# Patient Record
Sex: MALE | Race: BLACK OR AFRICAN AMERICAN | NOT HISPANIC OR LATINO | Employment: OTHER | URBAN - METROPOLITAN AREA
[De-identification: names, ages, dates, MRNs, and addresses within clinical notes are randomized per-mention and may not be internally consistent; named-entity substitution may affect disease eponyms.]

---

## 2017-01-24 ENCOUNTER — TELEPHONE (OUTPATIENT)
Dept: FAMILY MEDICINE | Facility: CLINIC | Age: 77
End: 2017-01-24

## 2017-01-24 RX ORDER — CODEINE PHOSPHATE AND GUAIFENESIN 10; 100 MG/5ML; MG/5ML
10 SOLUTION ORAL EVERY 4 HOURS PRN
Qty: 236 ML | Refills: 1 | Status: ON HOLD | OUTPATIENT
Start: 2017-01-24 | End: 2017-08-19

## 2017-01-27 ENCOUNTER — HOSPITAL ENCOUNTER (EMERGENCY)
Facility: HOSPITAL | Age: 77
Discharge: HOME | End: 2017-01-27
Attending: FAMILY MEDICINE
Payer: MEDICARE

## 2017-01-27 VITALS
BODY MASS INDEX: 22.13 KG/M2 | OXYGEN SATURATION: 96 % | TEMPERATURE: 99 F | HEART RATE: 87 BPM | DIASTOLIC BLOOD PRESSURE: 78 MMHG | RESPIRATION RATE: 30 BRPM | WEIGHT: 146 LBS | SYSTOLIC BLOOD PRESSURE: 118 MMHG | HEIGHT: 68 IN

## 2017-01-27 DIAGNOSIS — R06.00 DYSPNEA: ICD-10-CM

## 2017-01-27 DIAGNOSIS — I50.43 ACUTE ON CHRONIC COMBINED SYSTOLIC AND DIASTOLIC CONGESTIVE HEART FAILURE: Primary | ICD-10-CM

## 2017-01-27 LAB
ANION GAP SERPL CALC-SCNC: 10 MMOL/L
BASOPHILS # BLD AUTO: 0.01 K/UL
BASOPHILS NFR BLD: 0.2 %
BUN SERPL-MCNC: 35 MG/DL
CALCIUM SERPL-MCNC: 9.4 MG/DL
CHLORIDE SERPL-SCNC: 109 MMOL/L
CO2 SERPL-SCNC: 24 MMOL/L
CREAT SERPL-MCNC: 1.45 MG/DL
DIFFERENTIAL METHOD: ABNORMAL
EOSINOPHIL # BLD AUTO: 0.1 K/UL
EOSINOPHIL NFR BLD: 1 %
ERYTHROCYTE [DISTWIDTH] IN BLOOD BY AUTOMATED COUNT: 13.5 %
EST. GFR  (AFRICAN AMERICAN): 54.1 ML/MIN/1.73 M^2
EST. GFR  (NON AFRICAN AMERICAN): 46.8 ML/MIN/1.73 M^2
GLUCOSE SERPL-MCNC: 117 MG/DL
HCT VFR BLD AUTO: 37.7 %
HGB BLD-MCNC: 12.3 G/DL
LYMPHOCYTES # BLD AUTO: 0.9 K/UL
LYMPHOCYTES NFR BLD: 16 %
MCH RBC QN AUTO: 32.1 PG
MCHC RBC AUTO-ENTMCNC: 32.6 %
MCV RBC AUTO: 98 FL
MONOCYTES # BLD AUTO: 1.2 K/UL
MONOCYTES NFR BLD: 19.8 %
NEUTROPHILS # BLD AUTO: 3.7 K/UL
NEUTROPHILS NFR BLD: 63 %
NT-PROBNP: 4230 PG/ML
PLATELET # BLD AUTO: 133 K/UL
PMV BLD AUTO: 11.1 FL
POTASSIUM SERPL-SCNC: 4.3 MMOL/L
RBC # BLD AUTO: 3.83 M/UL
SODIUM SERPL-SCNC: 143 MMOL/L
WBC # BLD AUTO: 5.82 K/UL

## 2017-01-27 PROCEDURE — 63600175 PHARM REV CODE 636 W HCPCS

## 2017-01-27 PROCEDURE — 83880 ASSAY OF NATRIURETIC PEPTIDE: CPT

## 2017-01-27 PROCEDURE — 85025 COMPLETE CBC W/AUTO DIFF WBC: CPT

## 2017-01-27 PROCEDURE — 80048 BASIC METABOLIC PNL TOTAL CA: CPT

## 2017-01-27 PROCEDURE — 93005 ELECTROCARDIOGRAM TRACING: CPT

## 2017-01-27 PROCEDURE — 96374 THER/PROPH/DIAG INJ IV PUSH: CPT

## 2017-01-27 PROCEDURE — 99284 EMERGENCY DEPT VISIT MOD MDM: CPT | Mod: 25

## 2017-01-27 RX ORDER — FUROSEMIDE 10 MG/ML
INJECTION INTRAMUSCULAR; INTRAVENOUS
Status: COMPLETED
Start: 2017-01-27 | End: 2017-01-27

## 2017-01-27 RX ORDER — FUROSEMIDE 10 MG/ML
40 INJECTION INTRAMUSCULAR; INTRAVENOUS
Status: COMPLETED | OUTPATIENT
Start: 2017-01-27 | End: 2017-01-27

## 2017-01-27 RX ADMIN — FUROSEMIDE 40 MG: 10 INJECTION INTRAMUSCULAR; INTRAVENOUS at 10:01

## 2017-01-27 RX ADMIN — FUROSEMIDE 40 MG: 10 INJECTION, SOLUTION INTRAMUSCULAR; INTRAVENOUS at 10:01

## 2017-01-27 NOTE — ED PROVIDER NOTES
"Encounter Date: 1/27/2017       History     Chief Complaint   Patient presents with    Shortness of Breath     SOB that started last night. Pt states he has had a cough and cold for the last few days.      Review of patient's allergies indicates:   Allergen Reactions    Sulfa (sulfonamide antibiotics) Hives     HPI Comments: Patient's a 76-year-old black male with a history of coronary artery disease, hypertension, congestive heart failure and an implanted dual-chamber pacemaker who comes in complaining of shortness of breath beginning last night.  States he has had a cold with cough for "the last few days" .  States he saw his primary care physician's nurse practitioner for the cough and cold recently (chart review shows visit was December 20, 2016) and was prescribed pills for cough (Tessalon Perles) but the pills "weren't working for me" and his PCP prescribed Cheratussin AC (3 days ago)    The history is provided by the patient and the spouse.     Past Medical History   Diagnosis Date    Chronic combined systolic and diastolic congestive heart failure     Coronary artery disease     Diabetes mellitus, type II     Hyperlipidemia     Hypertension     Primary cardiomyopathy     Valvular regurgitation      m/s MR    Ventricular tachycardia      No past medical history pertinent negatives.  Past Surgical History   Procedure Laterality Date    Coronary angioplasty with stent placement  4/15/2008; 10/16/2014     LAD; RCA    Cardiac defibrillator placement  10/2/2008; 1/5/2015     [employer]; converted to biventricular ICD VIVA QUAD XT CRT-D [id number]: Serial No. [id number]    Cardiac defibrillator placement       Family History   Problem Relation Age of Onset    Diabetes Maternal Aunt      Social History   Substance Use Topics    Smoking status: Never Smoker    Smokeless tobacco: Never Used    Alcohol use No     Review of Systems   Constitutional: Negative for fever.   Respiratory: Positive for cough and " shortness of breath.    Cardiovascular: Negative for chest pain and palpitations.   Gastrointestinal: Negative for abdominal pain and nausea.   Skin: Negative for color change.   Neurological: Negative for syncope.   All other systems reviewed and are negative.      Physical Exam   Initial Vitals   BP Pulse Resp Temp SpO2   01/27/17 0813 01/27/17 0813 01/27/17 0813 01/27/17 0813 01/27/17 0813   107/69 97 32 97.9 °F (36.6 °C) 96 %     Physical Exam    Nursing note and vitals reviewed.  Constitutional: He appears well-developed and well-nourished. He is not diaphoretic. No distress.   HENT:   Head: Normocephalic.   Eyes: Pupils are equal, round, and reactive to light.   Neck: Normal range of motion. Neck supple.   Pulmonary/Chest: No respiratory distress. He has rales (bibasilar).   Musculoskeletal: Normal range of motion.   Neurological: He is alert and oriented to person, place, and time.   Skin: Skin is warm.   Psychiatric: He has a normal mood and affect.         ED Course   Procedures  Labs Reviewed   CBC W/ AUTO DIFFERENTIAL   B-TYPE NATRIURETIC PEPTIDE   BASIC METABOLIC PANEL     EKG Readings: (Independently Interpreted)   Initial Reading: No STEMI. Rhythm: Paced Rhythm. Heart Rate: 95. Ectopy: PVCs Greater than 6/min. ST Segments: Normal ST Segments. Axis: Normal. Clinical Impression: Paced Rhythm with PVCs          Medical Decision Making:   Clinical Tests:   Lab Tests: Ordered and Reviewed       <> Summary of Lab: BNP over 4000 (last one 6 months ago was around 2000)  Radiological Study: Ordered and Reviewed  ED Management:  BNP is elevated relative to last measure 6 months ago and chest x-ray shows some pulmonary vascular congestion.  Advised patient that this is probably some decompensation of his heart failure and not pulmonary.  Given Lasix 40 mg IV here and advised to take his by mouth Lasix daily until he follows up with his PCP next week.  Return to emergency if worsens.                   ED Course      Clinical Impression:   The primary encounter diagnosis was Acute on chronic combined systolic and diastolic congestive heart failure. A diagnosis of Dyspnea was also pertinent to this visit.          RHEA Langston MD  01/27/17 1047       RHEA Langston MD  01/27/17 7607

## 2017-01-27 NOTE — ED AVS SNAPSHOT
[facility]  [address] [place] [id number]               Trever Claros   2017  8:08 AM   ED    Description:  Male : 1940   Department:  [facility]           Your Care was Coordinated By:     Provider Role From Reynaldo Langston MD Attending Provider 17 0821 --      Reason for Visit     Shortness of Breath           Diagnoses this Visit        Comments    Acute on chronic combined systolic and diastolic congestive heart failure    -  Primary     Dyspnea           ED Disposition     ED Disposition Condition Comment    Discharge             To Do List           Follow-up Information     Schedule an appointment as soon as possible for a visit with Wiliam Mtz MD.    Specialty:  Family Medicine    Contact information:    [address] [facility] [address]  [phone]        [facility]     [facility] -  Assistance  Registered nurses in the [facility] provide clinical advisement, health education, appointment booking, and other advisory services.  Call for this free service at [phone].             Medications           Message regarding Medications     Verify the changes and/or additions to your medication regime listed below are the same as discussed with your clinician today.  If any of these changes or additions are incorrect, please notify your healthcare provider.        These medications were administered today        Dose Freq    furosemide injection 40 mg 40 mg ED 1 Time    Sig: Inject 4 mLs (40 mg total) into the vein ED 1 Time.    Class: Normal    Route: Intravenous    furosemide (LASIX) 10 mg/mL injection      Notes to Pharmacy: Created by cabinet override           Verify that the below list of medications is an accurate representation of the medications you are currently taking.  If none reported, the list may be blank. If incorrect, please contact your healthcare provider.  Carry this list with you in case of emergency.           Current Medications     allopurinol (ZYLOPRIM) 100 MG tablet Take 100 mg by mouth once daily.    aspirin (ECOTRIN) 81 MG EC tablet Take 81 mg by mouth once daily.    atorvastatin (LIPITOR) 20 MG tablet Take 1 tablet (20 mg total) by mouth every evening.    carvedilol (COREG) 12.5 MG tablet Take 1 tablet (12.5 mg total) by mouth 2 (two) times daily.    clopidogrel (PLAVIX) 75 mg tablet Take 1 tablet (75 mg total) by mouth once daily.    finasteride (PROSCAR) 5 mg tablet Take 5 mg by mouth once daily.    fosinopril (MONOPRIL) 20 MG tablet Take 1 tablet (20 mg total) by mouth once daily.    furosemide (LASIX) 20 MG tablet Take 1 tablet (20 mg) every Sunday, Monday, Wednesday, Friday    guaifenesin-codeine 100-10 mg/5 ml (CHERATUSSIN AC)  mg/5 mL syrup Take 10 mLs by mouth every 4 (four) hours as needed for Cough.    hydrALAZINE (APRESOLINE) 25 MG tablet Take 1 tablet (25 mg total) by mouth 2 (two) times daily.    isosorbide mononitrate (IMDUR) 60 MG 24 hr tablet Take 1 tablet (60 mg total) by mouth once daily.    nitroGLYCERIN (NITROSTAT) 0.4 MG SL tablet Place 1 tablet (0.4 mg total) under the tongue every 5 (five) minutes as needed for Chest pain (maximum 3 doses in 24 hours).    ranitidine (ZANTAC) 300 MG capsule Take 1 capsule (300 mg total) by mouth every evening.    saw palmetto 500 MG capsule Take 450 mg by mouth once daily.     tamsulosin (FLOMAX) 0.4 mg Cp24 Take 1 capsule by mouth 2 (two) times daily.     TRADJENTA 5 mg Tab tablet TAKE 1 TABLET BY MOUTH EACH DAY    URELLE 81-0.12 mg Tab Take 1 tablet by mouth as needed.     ciprofloxacin HCl (CIPRO) 250 MG tablet     furosemide injection 40 mg Inject 4 mLs (40 mg total) into the vein ED 1 Time.    polyethylene glycol (GLYCOLAX) 17 gram/dose powder TAKE 17 GRAMS (1 CAPFUL) BY MOUTH NIGHTLY.           Clinical Reference Information           Your Vitals Were     BP Pulse Temp Resp Height Weight     "117/77 (BP Location: Left arm, Patient Position: Lying, BP Method: Automatic) 97 98 °F (36.7 °C) (Oral) 30 5' 8" (1.727 m) 66.2 kg (146 lb)    SpO2 BMI             94% 22.2 kg/m2         Allergies as of 1/27/2017        Reactions    Sulfa (Sulfonamide Antibiotics) Hives      Immunizations Administered on Date of Encounter - 1/27/2017     None      ED Micro, Lab, POCT     Start Ordered       Status Ordering Provider    01/27/17 0828 01/27/17 0827  CBC auto differential  STAT      Final result     01/27/17 0828 01/27/17 0827    STAT,   Status:  Canceled      Canceled     01/27/17 0828 01/27/17 0827  Basic metabolic panel  STAT      Final result     01/27/17 0827 01/27/17 0827  NT-Pro Natriuretic Peptide  Once      Final result       ED Imaging Orders     Start Ordered       Status Ordering Provider    01/27/17 0828 01/27/17 0827  X-Ray Chest PA And Lateral  1 time imaging      Final result         Discharge Instructions       Take your furosemide 20 mg daily until you follow-up with Dr. Mtz next week    Discharge References/Attachments     HEART FAILURE, CONGESTIVE (ENGLISH)      Your Scheduled Appointments     Feb 13, 2017  8:20 AM CST   Debrillator Tune Up with PACEMAKER, ICD   [facility] )    [address]  [place] [id number] [phone]              [facility] Sign-Up     Activating your [facility] account is as easy as 1-2-3!     1) Visit [email or web address], select Sign Up Now, enter this activation code and your date of birth, then select Next.  [email or web address]  Expires: 2/3/2017 11:53 AM      2) Create a username and password to use when you visit [facility] in the future and select a security question in case you lose your password and select Next.    3) Enter your e-mail address and click Sign Up!    Additional Information  If you have questions, please e-mail [email or web address] or call [phone] to talk to our [facility] staff. Remember, [facility] is NOT to be used " for urgent needs. For medical emergencies, dial 911.          [facility] complies with applicable Federal civil rights laws and does not discriminate on the basis of race, color, national origin, age, disability, or sex.        Language Assistance Services     ATTENTION: Language assistance services are available, free of charge. Please call [phone].      ATENCIÓN: Si habla español, tiene a mason disposición servicios gratuitos de asistencia lingüística. Llame al [phone].     CHÚ Ý: N?u b?n nói Ti?ng Vi?t, có các d?ch v? h? tr? ngôn ng? mi?n phí dành cho b?n. G?i s? [phone].

## 2017-01-31 ENCOUNTER — HOSPITAL ENCOUNTER (EMERGENCY)
Facility: HOSPITAL | Age: 77
Discharge: HOME | End: 2017-01-31
Attending: EMERGENCY MEDICINE
Payer: MEDICARE

## 2017-01-31 VITALS
DIASTOLIC BLOOD PRESSURE: 56 MMHG | HEART RATE: 83 BPM | WEIGHT: 145 LBS | BODY MASS INDEX: 21.98 KG/M2 | OXYGEN SATURATION: 98 % | SYSTOLIC BLOOD PRESSURE: 90 MMHG | RESPIRATION RATE: 20 BRPM | HEIGHT: 68 IN

## 2017-01-31 DIAGNOSIS — I50.43 ACUTE ON CHRONIC COMBINED SYSTOLIC AND DIASTOLIC CONGESTIVE HEART FAILURE: Primary | ICD-10-CM

## 2017-01-31 DIAGNOSIS — R06.02 SOB (SHORTNESS OF BREATH): ICD-10-CM

## 2017-01-31 LAB
ALBUMIN SERPL BCP-MCNC: 4 G/DL
ALP SERPL-CCNC: 158 IU/L
ALT SERPL W/O P-5'-P-CCNC: 28 IU/L
ANION GAP SERPL CALC-SCNC: 9 MMOL/L
AST SERPL-CCNC: 26 IU/L
BASOPHILS # BLD AUTO: 0.01 K/UL
BASOPHILS NFR BLD: 0.2 %
BILIRUB SERPL-MCNC: 0.7 MG/DL
BUN SERPL-MCNC: 53 MG/DL
CALCIUM SERPL-MCNC: 9.3 MG/DL
CHLORIDE SERPL-SCNC: 106 MMOL/L
CO2 SERPL-SCNC: 27 MMOL/L
CREAT SERPL-MCNC: 1.85 MG/DL
DIFFERENTIAL METHOD: ABNORMAL
EOSINOPHIL # BLD AUTO: 0.1 K/UL
EOSINOPHIL NFR BLD: 1.9 %
ERYTHROCYTE [DISTWIDTH] IN BLOOD BY AUTOMATED COUNT: 13.4 %
EST. GFR  (AFRICAN AMERICAN): 40.3 ML/MIN/1.73 M^2
EST. GFR  (NON AFRICAN AMERICAN): 34.8 ML/MIN/1.73 M^2
GLUCOSE SERPL-MCNC: 117 MG/DL
HCT VFR BLD AUTO: 35.7 %
HGB BLD-MCNC: 11.7 G/DL
INR PPP: 1
LYMPHOCYTES # BLD AUTO: 1.7 K/UL
LYMPHOCYTES NFR BLD: 36.2 %
MCH RBC QN AUTO: 32 PG
MCHC RBC AUTO-ENTMCNC: 32.8 %
MCV RBC AUTO: 98 FL
MONOCYTES # BLD AUTO: 1.1 K/UL
MONOCYTES NFR BLD: 24.3 %
NEUTROPHILS # BLD AUTO: 1.7 K/UL
NEUTROPHILS NFR BLD: 37 %
NT-PROBNP: 3550 PG/ML
PLATELET # BLD AUTO: 142 K/UL
PMV BLD AUTO: 10.8 FL
POTASSIUM SERPL-SCNC: 4.1 MMOL/L
PROT SERPL-MCNC: 7.4 G/DL
PROTHROMBIN TIME: 11.6 SEC
RBC # BLD AUTO: 3.66 M/UL
SODIUM SERPL-SCNC: 142 MMOL/L
TROPONIN I SERPL DL<=0.01 NG/ML-MCNC: 0.05 NG/ML
WBC # BLD AUTO: 4.69 K/UL

## 2017-01-31 PROCEDURE — 99284 EMERGENCY DEPT VISIT MOD MDM: CPT | Mod: 25

## 2017-01-31 PROCEDURE — 83880 ASSAY OF NATRIURETIC PEPTIDE: CPT

## 2017-01-31 PROCEDURE — 80053 COMPREHEN METABOLIC PANEL: CPT

## 2017-01-31 PROCEDURE — 93005 ELECTROCARDIOGRAM TRACING: CPT

## 2017-01-31 PROCEDURE — 84484 ASSAY OF TROPONIN QUANT: CPT

## 2017-01-31 PROCEDURE — 85025 COMPLETE CBC W/AUTO DIFF WBC: CPT

## 2017-01-31 PROCEDURE — 85610 PROTHROMBIN TIME: CPT

## 2017-01-31 NOTE — ED AVS SNAPSHOT
[facility]  [address] [place] [id number]               Trever Claros   2017  1:08 AM   ED    Description:  Male : 1940   Department:  [facility]           Your Care was Coordinated By:     Provider Role From To    Lyn Mead MD Attending Provider 17 0130 --      Reason for Visit     Shortness of Breath           Diagnoses this Visit        Comments    Acute on chronic combined systolic and diastolic congestive heart failure    -  Primary     SOB (shortness of breath)           ED Disposition     ED Disposition Condition Comment    Discharge             To Do List           Follow-up Information     Follow up with Wiliam Mtz MD In 1 week(s).    Specialty:  Family Medicine    Contact information:    [address] [facility] [address]  [phone]        [facility]     [facility] -  Assistance  Registered nurses in the [facility] provide clinical advisement, health education, appointment booking, and other advisory services.  Call for this free service at [phone].             Medications           Message regarding Medications     Verify the changes and/or additions to your medication regime listed below are the same as discussed with your clinician today.  If any of these changes or additions are incorrect, please notify your healthcare provider.             Verify that the below list of medications is an accurate representation of the medications you are currently taking.  If none reported, the list may be blank. If incorrect, please contact your healthcare provider. Carry this list with you in case of emergency.           Current Medications     allopurinol (ZYLOPRIM) 100 MG tablet Take 100 mg by mouth once daily.    aspirin (ECOTRIN) 81 MG EC tablet Take 81 mg by mouth once daily.    atorvastatin (LIPITOR) 20 MG tablet Take 1 tablet (20 mg total) by mouth every evening.     "carvedilol (COREG) 12.5 MG tablet Take 1 tablet (12.5 mg total) by mouth 2 (two) times daily.    clopidogrel (PLAVIX) 75 mg tablet Take 1 tablet (75 mg total) by mouth once daily.    finasteride (PROSCAR) 5 mg tablet Take 5 mg by mouth once daily.    fosinopril (MONOPRIL) 20 MG tablet Take 1 tablet (20 mg total) by mouth once daily.    furosemide (LASIX) 20 MG tablet Take 1 tablet (20 mg) every Sunday, Monday, Wednesday, Friday    guaifenesin-codeine 100-10 mg/5 ml (CHERATUSSIN AC)  mg/5 mL syrup Take 10 mLs by mouth every 4 (four) hours as needed for Cough.    hydrALAZINE (APRESOLINE) 25 MG tablet Take 1 tablet (25 mg total) by mouth 2 (two) times daily.    isosorbide mononitrate (IMDUR) 60 MG 24 hr tablet Take 1 tablet (60 mg total) by mouth once daily.    nitroGLYCERIN (NITROSTAT) 0.4 MG SL tablet Place 1 tablet (0.4 mg total) under the tongue every 5 (five) minutes as needed for Chest pain (maximum 3 doses in 24 hours).    polyethylene glycol (GLYCOLAX) 17 gram/dose powder TAKE 17 GRAMS (1 CAPFUL) BY MOUTH NIGHTLY.    ranitidine (ZANTAC) 300 MG capsule Take 1 capsule (300 mg total) by mouth every evening.    saw palmetto 500 MG capsule Take 450 mg by mouth once daily.     tamsulosin (FLOMAX) 0.4 mg Cp24 Take 1 capsule by mouth 2 (two) times daily.     TRADJENTA 5 mg Tab tablet TAKE 1 TABLET BY MOUTH EACH DAY    URELLE 81-0.12 mg Tab Take 1 tablet by mouth as needed.     ciprofloxacin HCl (CIPRO) 250 MG tablet            Clinical Reference Information           Your Vitals Were     BP Pulse Resp Height Weight SpO2    90/56 (BP Location: Left arm, Patient Position: Sitting, BP Method: Automatic) 83 20 5' 8" (1.727 m) 65.8 kg (145 lb) 98%    BMI                22.05 kg/m2          Allergies as of 1/31/2017        Reactions    Sulfa (Sulfonamide Antibiotics) Hives      Immunizations Administered on Date of Encounter - 1/31/2017     None      ED Micro, Lab, POCT     Start Ordered       Status Ordering Provider "    01/31/17 0117 01/31/17 0117  Comprehensive metabolic panel  STAT      Final result     01/31/17 0117 01/31/17 0117  Troponin I  STAT      Final result     01/31/17 0117 01/31/17 0117  CBC auto differential  STAT      Final result     01/31/17 0117 01/31/17 0117  Protime-INR  STAT      Final result     01/31/17 0117 01/31/17 0117    STAT,   Status:  Canceled      Canceled     01/31/17 0117 01/31/17 0117  NT-Pro Natriuretic Peptide  Once      Final result       ED Imaging Orders     Start Ordered       Status Ordering Provider    01/31/17 0118 01/31/17 0117  X-Ray Chest AP Portable  1 time imaging      Preliminary result         Discharge Instructions           Left- or Right- Side Congestive Heart Failure    The heart is a large muscle. It is a pump that circulates blood throughout the body. Blood carries oxygen to all of the organs, including the brain, muscles, and skin. After your body takes the oxygen out of the blood, the blood returns to the heart. The right side of the heart collects the blood from the body and pumps it to the lungs. In the lungs, it gets fresh oxygen and gives up carbon dioxide. The oxygen-rich blood from the lungs then returns to the left side of the heart, where it is pumped back out to the rest of your body, starting the process all over.  Congestive heart failure (CHF) occurs when the heart muscle is weakened. This affects the pumping action of the heart. Heart failure can affect the right side of the heart or the left side. But heart failure may affect not only the right side of the heart or only the left side. Although it may have started on one side, it can and often eventually does affect both sides.  Right-side heart failure  When the right side of the heart is weakened, it cant handle the blood it is getting from the rest of the body. This blood returns to the heart through veins. When too much pressure builds up in the veins, fluid leaks out into the tissues. Gravity then  causes that fluid to move to those parts of the body that are the lowest. So one of the first symptoms of right-side CHF can include swelling in the feet and ankles. If the condition gets worse, the swelling can even go up past the knees. Sometimes it gets so severe, the liver can get congested as well.  Left-side heart failure  When the left side of the heart is weakened, it cant handle the blood it gets from the lungs. Pressure then builds up in the veins of the lungs, causing fluid to leak into the lung tissues. This may cause CHF and pulmonary edema. This causes you to feel short of breath, weak, or dizzy. These symptoms are often worse with exertion, such as when climbing stairs or walking up hills. Lying with your head flat is uncomfortable and can make your breathing worse. This may make sleeping difficult. You may need to use extra pillows to elevate your upper body to sleep well. The same is true when just resting during the daytime.  There are many causes of heart failure including:  · Coronary artery disease  · Past heart attack (also known as acute myocardial infarction, or AMI)  · High blood pressure  · Damaged heart valve  · Diabetes  · Obesity  · Cigarette smoking  · Alcohol abuse  Heart failure is a chronic condition. There is no cure. The purpose of medical treatment is to improve the pumping action of the heart. The main way to do this is to remove excess water from the body. A number of medicines can help reach this goal, improve symptoms, and prevent the heart from becoming weaker. Sometimes, heart failure can become so severe that a device is placed in the heart to help with pumping. Another major goal is to better treat the causes of heart failure, such as diabetes and high blood pressure, by making changes in your lifestyle and maximizing medical control when needed.  Home care  Follow these guidelines when caring for yourself at home:  · Check your weight every day. This is very important  because a sudden increase in weight gain could mean worsening heart failure. Keep these things in mind:  ¨ Use the same scale every day.  ¨ Weigh yourself at the same time every day.  ¨ Make sure the scale is on a hard floor surface, not on a rug or carpet.  ¨ Keep a record of your weight every day so your healthcare provider can see it. If you are not given a log sheet for this, keep a separate journal for this purpose.   · Cut back on the amount of salt (sodium) you eat. Follow your healthcare provider's recommendation on how much salt or sodium you should have each day.  ¨ Avoid high-salt foods. These include olives, pickles, smoked meats, salted potato chips, and most prepared foods.  ¨ Don't add salt to your food at the table. Use only small amounts of salt when cooking.  ¨ Read the labels carefully on food packages to learn how much salt or sodium is in each serving in the package. Remember, a can or package of food may contain more than 1 serving. So if you eat all the food in the package, you may be getting more salt than you think.  · Follow your healthcare provider's recommendations about how much fluid you should have. Be aware that some foods, such as soup, pudding, and juicy fruits like oranges or melons, contain liquid. You'll need to count the liquid in those foods as part of your daily fluid intake. Your provider can help you with this.  · Stop smoking.  · Cut back on how much alcohol you drink.  · Lose weight if you are overweight. The excess weight adds a lot of stress on the workload of the heart.  · Stay active. Talk with your provider about an exercise program that is safe for your heart.  · Keep your feet elevated to reduce swelling. Ask your provider about support hose as a preventive treatment for daytime leg swelling.  Besides taking your medicine as instructed, an important part of treatment is lifestyle changes. These include diet, physical activity, stopping smoking, and weight  control.  Improve your diet by including more fresh foods, cutting back on how much sugar and saturated fat you eat, and eating fewer processed foods and less salt.  Follow-up care  Follow up with your healthcare provider, or as advised.  Make sure to keep any appointments that were made for you. These can help better control your congestive heart failure. You will need to follow up with your provider on a routine basis to make sure your heart failure is well managed.  If an X-ray, ECG, or other tests were done, you will be told of any new findings that may affect your care.  Call 911  Call 911 if you:  · Become severely short of breath  · Feel lightheaded, or feel like you might pass out or faint  · Have chest pain or discomfort that is different than usual, the medicines your doctor told you to use for this don't help, or the pain lasts longer than 10 to 15 minutes  · You suddenly develop a rapid heart rate  When to seek medical advice  The following may be signs that your heart failure is getting worse. Call your healthcare provider right away if any of these happen:  · Sudden weight gain. This means 3 or more pounds in one day, or 5 or more pounds in 1 week.  · Trouble breathing not related to being active  · New or increased swelling of your legs or ankles  · Swelling or pain in your abdomen  · Breathing trouble at night. This means waking up short of breath or needing more pillows to breathe.  · Frequent coughing that doesnt go away  · Feeling much more tired than usual  © 4826-3861 [employer]. [address] [place] [address], [place] [address]. All rights reserved. This information is not intended as a substitute for professional medical care. Always follow your healthcare professional's instructions.          Coping with Heart Failure  Its normal to feel sad or down at times when youre living with heart failure. Some medicines can also affect your mood. Following your treatment plan may seem  difficult at times. If you feel overwhelmed, just focus on one day at a time. Dont be afraid to ask others for help when you need it.    Ways to feel better  Try not to withdraw from family and friends, even if you are finding it hard to talk to them. They can still be a good source of support. To feel better, you can also:  · Spend time doing things you enjoy. This may include participating in a favorite hobby, meditating, praying, or spending time with people you care about. Find activities that make you happy and make those a priority.  · Share what you learn about heart failure with the people in your life. Invite family members along when you visit your healthcare provider. This will help you feel supported as well as help you discuss the care plan you've agreed upon with your doctor.  · Think about joining a support group for people with heart failure. It may be easier to talk to people who know firsthand what youre going through. They can offer advice and share stories. You may want to ask loved ones to join you for a meeting.  Asking for help  Having heart failure doesnt mean that you have to feel bad all the time. Consider talking to your healthcare provider or a therapist if:  · You feel worthless or helpless, or are thinking about suicide. These are warning signs of depression. Treatment can help you feel better. When depression is under control, your overall health may also improve.  · You feel anxious about what will happen to your loved ones if your health gets worse. Taking care of legal arrangements, such as a living will and durable power of , can help you feel more secure about the future.  · Social support helps alleviate stress and helps your stick with your healthy lifestyle changes. Without social support, you may end up back in the hospital.  © 4432-8963 [employer]. [address] [place] [address], [place] [address]. All rights reserved. This information is not intended as a  substitute for professional medical care. Always follow your healthcare professional's instructions.          Your Scheduled Appointments     Feb 13, 2017  8:20 AM CST   Debrillator Tune Up with PACEMAKER, ICD   [facility] ([address] )    [address]  [facility] [id number] [phone]              [facility] Sign-Up     Activating your [facility] account is as easy as 1-2-3!     1) Visit [email or web address], select Sign Up Now, enter this activation code and your date of birth, then select Next.  [email or web address]  Expires: 2/3/2017 11:53 AM      2) Create a username and password to use when you visit [facility] in the future and select a security question in case you lose your password and select Next.    3) Enter your e-mail address and click Sign Up!    Additional Information  If you have questions, please e-mail [email or web address] or call [phone] to talk to our [facility] staff. Remember, [facility] is NOT to be used for urgent needs. For medical emergencies, dial 911.          [facility] complies with applicable Federal civil rights laws and does not discriminate on the basis of race, color, national origin, age, disability, or sex.        Language Assistance Services     ATTENTION: Language assistance services are available, free of charge. Please call [phone].      ATENCIÓN: Si habla español, tiene a mason disposición servicios gratuitos de asistencia lingüística. Llame al [phone].     CHÚ Ý: N?u b?n nói Ti?ng Vi?t, có các d?ch v? h? tr? ngôn ng? mi?n phí dành cho b?n. G?i s? [phone].

## 2017-01-31 NOTE — ED NOTES
Pt remains on cardiac monitor. Pt seems less anxious. Pt denies needs. Awake and alert. Respirations non labored. Warm blankets provided. Pt denies shortness of breath at present.

## 2017-01-31 NOTE — ED TRIAGE NOTES
"Pt here with complaints of shortness of breath x few hours. Pt anxious, keeps repeating himself stating "ma'am please check my lungs, hurry before it gets too bad".  Pt seen here Friday for same. Hx of CHF. Pt reports he took 3 - 20 mg Lasix 30 minutes PTA. Denies chest pain. Pt also states "if may be anxiety, they wanted to give me something for it but I didn't want it".  Pt awake and alert. Respirations non labored. Family at bedside.  "

## 2017-01-31 NOTE — ED PROVIDER NOTES
Encounter Date: 1/31/2017       History     Chief Complaint   Patient presents with    Shortness of Breath     hx of CHF-took 3 Lasix 30 minutes PTA-denies pain     Review of patient's allergies indicates:   Allergen Reactions    Sulfa (sulfonamide antibiotics) Hives     Patient is a 76 y.o. male presenting with the following complaint: shortness of breath. The history is provided by the patient.   Shortness of Breath   This is a recurrent problem. The average episode lasts 1 day. The problem occurs continuously.The problem has been gradually improving. Pertinent negatives include no fever, no headaches, no rhinorrhea, no sore throat, no ear pain, no neck pain, no cough, no sputum production, no wheezing, no PND and no chest pain. He has tried nothing for the symptoms. The treatment provided no relief. He has had prior hospitalizations. He has had prior ED visits. He has had prior ICU admissions. Associated medical issues include heart failure.     Past Medical History   Diagnosis Date    Chronic combined systolic and diastolic congestive heart failure     Coronary artery disease     Diabetes mellitus, type II     Hyperlipidemia     Hypertension     Primary cardiomyopathy     Valvular regurgitation      m/s MR    Ventricular tachycardia      No past medical history pertinent negatives.  Past Surgical History   Procedure Laterality Date    Coronary angioplasty with stent placement  4/15/2008; 10/16/2014     LAD; RCA    Cardiac defibrillator placement  10/2/2008; 1/5/2015     [employer]; converted to biventricular ICD VIVA QUAD XT CRT-D [id number]: Serial No. [id number]    Cardiac defibrillator placement       Family History   Problem Relation Age of Onset    Diabetes Maternal Aunt      Social History   Substance Use Topics    Smoking status: Never Smoker    Smokeless tobacco: Never Used    Alcohol use No     Review of Systems   Constitutional: Negative for fever.   HENT: Negative for ear pain, rhinorrhea  and sore throat.    Respiratory: Positive for shortness of breath. Negative for cough, sputum production and wheezing.    Cardiovascular: Negative for chest pain and PND.   Musculoskeletal: Negative for neck pain.   Neurological: Negative for headaches.   All other systems reviewed and are negative.      Physical Exam   Initial Vitals   BP Pulse Resp Temp SpO2   01/31/17 0108 01/31/17 0108 01/31/17 0108 -- 01/31/17 0108   95/75 80 24  100 %     Physical Exam    Nursing note and vitals reviewed.  Constitutional: He appears well-developed and well-nourished.   HENT:   Head: Normocephalic and atraumatic.   Eyes: EOM are normal.   Neck: Normal range of motion. Neck supple.   Cardiovascular: Normal rate, regular rhythm, normal heart sounds and intact distal pulses.   Pulmonary/Chest: Breath sounds normal.   Abdominal: Soft.   Musculoskeletal: Normal range of motion.   Neurological: He is alert and oriented to person, place, and time.   Skin: Skin is warm and dry.   Psychiatric: He has a normal mood and affect. His behavior is normal. Judgment and thought content normal.         ED Course   Procedures  Labs Reviewed   COMPREHENSIVE METABOLIC PANEL - Abnormal; Notable for the following:        Result Value    Glucose 117 (*)     BUN, Bld 53 (*)     Creatinine 1.85 (*)     Alkaline Phosphatase 158 (*)     eGFR if  40.3 (*)     eGFR if non  34.8 (*)     All other components within normal limits   TROPONIN I - Abnormal; Notable for the following:     Troponin I 0.046 (*)     All other components within normal limits   CBC W/ AUTO DIFFERENTIAL - Abnormal; Notable for the following:     RBC 3.66 (*)     Hemoglobin 11.7 (*)     Hematocrit 35.7 (*)     MCH 32.0 (*)     Platelets 142 (*)     Gran # 1.7 (*)     Mono # 1.1 (*)     Gran% 37.0 (*)     Mono% 24.3 (*)     All other components within normal limits   NT-PRO NATRIURETIC PEPTIDE - Abnormal; Notable for the following:     NT-proBNP 3550 (*)      All other components within normal limits   PROTIME-INR     EKG Readings: (Independently Interpreted)   Initial Reading: No STEMI. Rhythm: Paced Rhythm. Heart Rate: 87. Ectopy: No Ectopy. Conduction: Normal. ST Segments: Normal ST Segments. T Waves: Normal.       X-Rays:   Independently Interpreted Readings:   Other Readings:  Mild pulmonary edema    Medical Decision Making:   Clinical Tests:   Lab Tests: Ordered and Reviewed  Radiological Study: Ordered and Reviewed  Medical Tests: Ordered and Reviewed                   ED Course     Clinical Impression:   The primary encounter diagnosis was Acute on chronic combined systolic and diastolic congestive heart failure. A diagnosis of SOB (shortness of breath) was also pertinent to this visit.    Disposition:   Disposition: Discharged  Condition: Stable       Lyn Mead MD  01/31/17 0232

## 2017-02-09 ENCOUNTER — OFFICE VISIT (OUTPATIENT)
Dept: FAMILY MEDICINE | Facility: CLINIC | Age: 77
End: 2017-02-09
Payer: MEDICARE

## 2017-02-09 VITALS
HEART RATE: 74 BPM | HEIGHT: 68 IN | BODY MASS INDEX: 21.78 KG/M2 | DIASTOLIC BLOOD PRESSURE: 70 MMHG | SYSTOLIC BLOOD PRESSURE: 124 MMHG | WEIGHT: 143.75 LBS | TEMPERATURE: 98 F | OXYGEN SATURATION: 94 %

## 2017-02-09 DIAGNOSIS — I34.0 MITRAL VALVE INSUFFICIENCY, UNSPECIFIED ETIOLOGY: Chronic | ICD-10-CM

## 2017-02-09 DIAGNOSIS — N18.30 CHRONIC KIDNEY DISEASE, STAGE 3: Chronic | ICD-10-CM

## 2017-02-09 DIAGNOSIS — E11.9 DIABETES MELLITUS TYPE 2, NONINSULIN DEPENDENT: Chronic | ICD-10-CM

## 2017-02-09 DIAGNOSIS — I42.0 ISCHEMIC DILATED CARDIOMYOPATHY: Chronic | ICD-10-CM

## 2017-02-09 DIAGNOSIS — Z95.5 STATUS POST CORONARY ARTERY STENT PLACEMENT: ICD-10-CM

## 2017-02-09 DIAGNOSIS — I47.20 VENTRICULAR TACHYARRHYTHMIA: ICD-10-CM

## 2017-02-09 DIAGNOSIS — I47.20 VENTRICULAR TACHYCARDIA: ICD-10-CM

## 2017-02-09 DIAGNOSIS — Z98.61 CAD S/P PERCUTANEOUS CORONARY ANGIOPLASTY: Chronic | ICD-10-CM

## 2017-02-09 DIAGNOSIS — I25.5 ISCHEMIC DILATED CARDIOMYOPATHY: Chronic | ICD-10-CM

## 2017-02-09 DIAGNOSIS — T46.4X5A ACE-INHIBITOR COUGH: Primary | ICD-10-CM

## 2017-02-09 DIAGNOSIS — I50.42 CHRONIC COMBINED SYSTOLIC AND DIASTOLIC CONGESTIVE HEART FAILURE: Chronic | ICD-10-CM

## 2017-02-09 DIAGNOSIS — R05.8 ACE-INHIBITOR COUGH: Primary | ICD-10-CM

## 2017-02-09 DIAGNOSIS — F32.A DEPRESSION, UNSPECIFIED DEPRESSION TYPE: ICD-10-CM

## 2017-02-09 DIAGNOSIS — R16.0 LIVER MASS, LEFT LOBE: ICD-10-CM

## 2017-02-09 DIAGNOSIS — Z95.810 AICD (AUTOMATIC CARDIOVERTER/DEFIBRILLATOR) PRESENT: Chronic | ICD-10-CM

## 2017-02-09 DIAGNOSIS — N40.1 BENIGN NON-NODULAR PROSTATIC HYPERPLASIA WITH LOWER URINARY TRACT SYMPTOMS: Chronic | ICD-10-CM

## 2017-02-09 DIAGNOSIS — I10 ESSENTIAL HYPERTENSION: Chronic | ICD-10-CM

## 2017-02-09 DIAGNOSIS — K21.9 GASTROESOPHAGEAL REFLUX DISEASE, ESOPHAGITIS PRESENCE NOT SPECIFIED: Chronic | ICD-10-CM

## 2017-02-09 DIAGNOSIS — I25.10 CAD S/P PERCUTANEOUS CORONARY ANGIOPLASTY: Chronic | ICD-10-CM

## 2017-02-09 PROCEDURE — 99213 OFFICE O/P EST LOW 20 MIN: CPT | Mod: S$GLB,,, | Performed by: FAMILY MEDICINE

## 2017-02-09 RX ORDER — ESCITALOPRAM OXALATE 10 MG/1
10 TABLET ORAL DAILY
Qty: 30 TABLET | Refills: 11 | Status: SHIPPED | OUTPATIENT
Start: 2017-02-09 | End: 2018-02-09

## 2017-02-09 RX ORDER — BENZONATATE 200 MG/1
200 CAPSULE ORAL 4 TIMES DAILY PRN
Qty: 30 CAPSULE | Refills: 1 | Status: ON HOLD | OUTPATIENT
Start: 2017-02-09 | End: 2017-08-19

## 2017-02-09 RX ORDER — LOSARTAN POTASSIUM 50 MG/1
50 TABLET ORAL DAILY
Qty: 90 TABLET | Refills: 3 | Status: SHIPPED | OUTPATIENT
Start: 2017-02-09 | End: 2017-03-27 | Stop reason: SINTOL

## 2017-02-09 NOTE — PROGRESS NOTES
Subjective:      Patient ID: Trever Claros is a 76 y.o. male.    Chief Complaint: Follow-up (ER f/u); Cough; and Anxiety    HPI Comments: Cough for 4 weeks; dry; on an ace; has chf; doesn't feel like; not tired; also, short tempered, getting worse; no alcohol; not suicidal or homocidal;     Cough   Pertinent negatives include no chest pain, ear pain, fever, headaches, myalgias, sore throat or shortness of breath.   Anxiety   Patient reports no chest pain, confusion, decreased concentration, nervous/anxious behavior, shortness of breath or suicidal ideas.         Review of Systems   Constitutional: Negative for appetite change, fatigue, fever and unexpected weight change.   HENT: Negative for congestion, ear pain, sinus pressure and sore throat.    Eyes: Negative for pain and visual disturbance.   Respiratory: Negative for shortness of breath.    Cardiovascular: Negative for chest pain.   Gastrointestinal: Negative for abdominal pain, constipation and diarrhea.   Endocrine: Negative for polyuria.   Genitourinary: Negative for difficulty urinating and frequency.   Musculoskeletal: Negative for arthralgias, back pain and myalgias.   Skin: Negative for color change.   Allergic/Immunologic: Negative.    Neurological: Negative for syncope, weakness and headaches.   Hematological: Does not bruise/bleed easily.   Psychiatric/Behavioral: Positive for agitation. Negative for behavioral problems, confusion, decreased concentration, dysphoric mood, hallucinations, self-injury, sleep disturbance and suicidal ideas. The patient is not nervous/anxious.    All other systems reviewed and are negative.    Objective:     Physical Exam   Constitutional: He is oriented to person, place, and time. He appears well-developed and well-nourished. No distress.   HENT:   Head: Normocephalic and atraumatic.   Right Ear: External ear normal.   Left Ear: External ear normal.   Mouth/Throat: Oropharynx is clear and moist. No oropharyngeal  exudate.   Eyes: Conjunctivae and EOM are normal. Pupils are equal, round, and reactive to light. Right eye exhibits no discharge. Left eye exhibits no discharge. No scleral icterus.   Neck: Normal range of motion. Neck supple. No JVD present. No tracheal deviation present. No thyromegaly present.   Cardiovascular: Normal rate and regular rhythm.  Exam reveals no gallop and no friction rub.    No murmur heard.  Pulmonary/Chest: Effort normal and breath sounds normal. No stridor. No respiratory distress. He has no wheezes. He has no rales. He exhibits no tenderness.   Abdominal: Soft. He exhibits no distension and no mass. There is no tenderness. There is no rebound and no guarding.   Musculoskeletal: Normal range of motion. He exhibits no edema or tenderness.   Lymphadenopathy:     He has no cervical adenopathy.   Neurological: He is alert and oriented to person, place, and time. He has normal reflexes. He displays normal reflexes. No cranial nerve deficit. He exhibits normal muscle tone. Coordination normal.   Skin: Skin is warm and dry. No rash noted. He is not diaphoretic. No erythema. No pallor.   Psychiatric: He has a normal mood and affect. His behavior is normal. Judgment and thought content normal.   Nursing note and vitals reviewed.    Assessment:     1. ACE-inhibitor cough    2. Depression, unspecified depression type    3. CAD S/P percutaneous coronary angioplasty    4. Chronic combined systolic and diastolic congestive heart failure    5. Essential hypertension    6. Ischemic dilated cardiomyopathy    7. Mitral valve insufficiency, unspecified etiology    8. Ventricular tachyarrhythmia    9. Ventricular tachycardia    10. Benign non-nodular prostatic hyperplasia with lower urinary tract symptoms    11. Chronic kidney disease, stage 3    12. Liver mass, left lobe    13. Diabetes mellitus type 2, noninsulin dependent    14. Gastroesophageal reflux disease, esophagitis presence not specified    15. AICD  (automatic cardioverter/defibrillator) present    16. Status post coronary artery stent placement      Plan:     New Prescriptions    BENZONATATE (TESSALON) 200 MG CAPSULE    Take 1 capsule (200 mg total) by mouth 4 (four) times daily as needed for Cough.    ESCITALOPRAM OXALATE (LEXAPRO) 10 MG TABLET    Take 1 tablet (10 mg total) by mouth once daily. For depression    LOSARTAN (COZAAR) 50 MG TABLET    Take 1 tablet (50 mg total) by mouth once daily.     Discontinued Medications    CIPROFLOXACIN HCL (CIPRO) 250 MG TABLET    Take 250 mg by mouth every 12 (twelve) hours.     FOSINOPRIL (MONOPRIL) 20 MG TABLET    Take 1 tablet (20 mg total) by mouth once daily.     Modified Medications    No medications on file       ACE-inhibitor cough    Depression, unspecified depression type    CAD S/P percutaneous coronary angioplasty    Chronic combined systolic and diastolic congestive heart failure    Essential hypertension    Ischemic dilated cardiomyopathy    Mitral valve insufficiency, unspecified etiology    Ventricular tachyarrhythmia    Ventricular tachycardia    Benign non-nodular prostatic hyperplasia with lower urinary tract symptoms    Chronic kidney disease, stage 3    Liver mass, left lobe    Diabetes mellitus type 2, noninsulin dependent    Gastroesophageal reflux disease, esophagitis presence not specified    AICD (automatic cardioverter/defibrillator) present    Status post coronary artery stent placement    Other orders  -     losartan (COZAAR) 50 MG tablet; Take 1 tablet (50 mg total) by mouth once daily.  Dispense: 90 tablet; Refill: 3  -     benzonatate (TESSALON) 200 MG capsule; Take 1 capsule (200 mg total) by mouth 4 (four) times daily as needed for Cough.  Dispense: 30 capsule; Refill: 1  -     escitalopram oxalate (LEXAPRO) 10 MG tablet; Take 1 tablet (10 mg total) by mouth once daily. For depression  Dispense: 30 tablet; Refill: 11

## 2017-02-09 NOTE — MR AVS SNAPSHOT
[address] [address]  Phone: [phone]  Fax: [phone]                  Trever Claros   2017 11:20 AM   Office Visit    Description:  Male : 1940   Provider:  Wiliam Mtz MD   Department:  [facility]           Reason for Visit     Follow-up     Cough     Anxiety           Diagnoses this Visit        Comments    ACE-inhibitor cough    -  Primary     Depression, unspecified depression type                To Do List           Future Appointments        Provider Department Dept Phone    2017 8:20 AM PACEMAKER, [facility] [address] - Arrhythmia [phone]      Goals (5 Years of Data)     None      Follow-Up and Disposition     Return in about 3 weeks (around 3/2/2017).       These Medications        Disp Refills Start End    losartan (COZAAR) 50 MG tablet 90 tablet 3 2017    Take 1 tablet (50 mg total) by mouth once daily. - Oral    Pharmacy: [facility]/pharmacy #[id number] [address] Ph #: [phone]       benzonatate (TESSALON) 200 MG capsule 30 capsule 1 2017     Take 1 capsule (200 mg total) by mouth 4 (four) times daily as needed for Cough. - Oral    Pharmacy: [facility]/pharmacy #[id number] [place] [address] Ph #: [phone]       escitalopram oxalate (LEXAPRO) 10 MG tablet 30 tablet 11 2017    Take 1 tablet (10 mg total) by mouth once daily. For depression - Oral    Pharmacy: [facility]/pharmacy [address] [place] [address] Ph #: [phone]         [facility]     [facility] On Call Nurse Care Line -  Assistance  Registered nurses in the [facility] provide clinical advisement, health education, appointment booking, and other advisory services.  Call for this free service at [phone].             Medications           Message regarding Medications      Verify the changes and/or additions to your medication regime listed below are the same as discussed with your clinician today.  If any of these changes or additions are incorrect, please notify your healthcare provider.        START taking these NEW medications        Refills    losartan (COZAAR) 50 MG tablet 3    Sig: Take 1 tablet (50 mg total) by mouth once daily.    Class: Normal    Route: Oral    benzonatate (TESSALON) 200 MG capsule 1    Sig: Take 1 capsule (200 mg total) by mouth 4 (four) times daily as needed for Cough.    Class: Normal    Route: Oral    escitalopram oxalate (LEXAPRO) 10 MG tablet 11    Sig: Take 1 tablet (10 mg total) by mouth once daily. For depression    Class: Normal    Route: Oral      STOP taking these medications     ciprofloxacin HCl (CIPRO) 250 MG tablet Take 250 mg by mouth every 12 (twelve) hours.     fosinopril (MONOPRIL) 20 MG tablet Take 1 tablet (20 mg total) by mouth once daily.           Verify that the below list of medications is an accurate representation of the medications you are currently taking.  If none reported, the list may be blank. If incorrect, please contact your healthcare provider. Carry this list with you in case of emergency.           Current Medications     allopurinol (ZYLOPRIM) 100 MG tablet Take 100 mg by mouth once daily.    aspirin (ECOTRIN) 81 MG EC tablet Take 81 mg by mouth once daily.    atorvastatin (LIPITOR) 20 MG tablet Take 1 tablet (20 mg total) by mouth every evening.    carvedilol (COREG) 12.5 MG tablet Take 1 tablet (12.5 mg total) by mouth 2 (two) times daily.    clopidogrel (PLAVIX) 75 mg tablet Take 1 tablet (75 mg total) by mouth once daily.    finasteride (PROSCAR) 5 mg tablet Take 5 mg by mouth once daily.    furosemide (LASIX) 20 MG tablet Take 1 tablet (20 mg) every Sunday, Monday, Wednesday, Friday    guaifenesin-codeine 100-10 mg/5 ml (CHERATUSSIN AC)  mg/5 mL syrup Take 10 mLs by mouth every 4 (four) hours as needed  "for Cough.    hydrALAZINE (APRESOLINE) 25 MG tablet Take 1 tablet (25 mg total) by mouth 2 (two) times daily.    isosorbide mononitrate (IMDUR) 60 MG 24 hr tablet Take 1 tablet (60 mg total) by mouth once daily.    nitroGLYCERIN (NITROSTAT) 0.4 MG SL tablet Place 1 tablet (0.4 mg total) under the tongue every 5 (five) minutes as needed for Chest pain (maximum 3 doses in 24 hours).    polyethylene glycol (GLYCOLAX) 17 gram/dose powder TAKE 17 GRAMS (1 CAPFUL) BY MOUTH NIGHTLY.    ranitidine (ZANTAC) 300 MG capsule Take 1 capsule (300 mg total) by mouth every evening.    saw palmetto 500 MG capsule Take 450 mg by mouth once daily.     tamsulosin (FLOMAX) 0.4 mg Cp24 Take 1 capsule by mouth 2 (two) times daily.     TRADJENTA 5 mg Tab tablet TAKE 1 TABLET BY MOUTH EACH DAY    URELLE 81-0.12 mg Tab Take 1 tablet by mouth as needed.     benzonatate (TESSALON) 200 MG capsule Take 1 capsule (200 mg total) by mouth 4 (four) times daily as needed for Cough.    escitalopram oxalate (LEXAPRO) 10 MG tablet Take 1 tablet (10 mg total) by mouth once daily. For depression    losartan (COZAAR) 50 MG tablet Take 1 tablet (50 mg total) by mouth once daily.           Clinical Reference Information           Your Vitals Were     BP Pulse Temp Height Weight SpO2    124/70 74 97.9 °F (36.6 °C) (Oral) 5' 8" (1.727 m) 65.2 kg (143 lb 11.8 oz) 94%    BMI                21.86 kg/m2          Blood Pressure          Most Recent Value    BP  124/70      Allergies as of 2/9/2017     Ace Inhibitors    Sulfa (Sulfonamide Antibiotics)      Immunizations Administered on Date of Encounter - 2/9/2017     None      [facility] Sign-Up     Activating your [facility] account is as easy as 1-2-3!     1) Visit [email or web address], select Sign Up Now, enter this activation code and your date of birth, then select Next.  [email or web address]  Expires: 3/26/2017 12:59 PM      2) Create a username and password to use when you visit [facility] in the future and select a " security question in case you lose your password and select Next.    3) Enter your e-mail address and click Sign Up!    Additional Information  If you have questions, please e-mail [email or web address] or call [phone] to talk to our [facility] staff. Remember, [facility] is NOT to be used for urgent needs. For medical emergencies, dial 911.         Language Assistance Services     ATTENTION: Language assistance services are available, free of charge. Please call [phone].      ATENCIÓN: Si habla español, tiene a mason disposición servicios gratuitos de asistencia lingüística. Llame al [phone].     CHÚ Ý: N?u b?n nói Ti?ng Vi?t, có các d?ch v? h? tr? ngôn ng? mi?n phí dành cho b?n. G?i s? [phone].         [facility] complies with applicable Federal civil rights laws and does not discriminate on the basis of race, color, national origin, age, disability, or sex.

## 2017-02-13 PROBLEM — F32.A DEPRESSION: Status: ACTIVE | Noted: 2017-02-13

## 2017-03-08 ENCOUNTER — HOSPITAL ENCOUNTER (EMERGENCY)
Facility: HOSPITAL | Age: 77
Discharge: HOME | End: 2017-03-08
Attending: FAMILY MEDICINE
Payer: MEDICARE

## 2017-03-08 VITALS
SYSTOLIC BLOOD PRESSURE: 95 MMHG | BODY MASS INDEX: 22.13 KG/M2 | RESPIRATION RATE: 20 BRPM | HEART RATE: 69 BPM | HEIGHT: 68 IN | DIASTOLIC BLOOD PRESSURE: 70 MMHG | OXYGEN SATURATION: 96 % | WEIGHT: 146 LBS | TEMPERATURE: 98 F

## 2017-03-08 DIAGNOSIS — R05.9 COUGH: ICD-10-CM

## 2017-03-08 DIAGNOSIS — J20.9 ACUTE BRONCHITIS, UNSPECIFIED ORGANISM: Primary | ICD-10-CM

## 2017-03-08 PROCEDURE — 99283 EMERGENCY DEPT VISIT LOW MDM: CPT

## 2017-03-08 RX ORDER — AZITHROMYCIN 250 MG/1
250 TABLET, FILM COATED ORAL DAILY
Qty: 6 TABLET | Refills: 0 | Status: SHIPPED | OUTPATIENT
Start: 2017-03-08 | End: 2017-03-18

## 2017-03-08 NOTE — ED PROVIDER NOTES
Encounter Date: 3/8/2017       History     Chief Complaint   Patient presents with    Cough     cough that started 3 weeks ago. States he saw Dr. Mtz when it started and the medication did not work. Pt also reports chills and fever on and off over the last 3 weeks.     Review of patient's allergies indicates:   Allergen Reactions    Ace inhibitors     Sulfa (sulfonamide antibiotics) Hives     HPI Comments: PT C/O intermittent COUGH SINCE 3 WEEKS, FEVER ON Saturday. COUGH IS DRY. NO SOB. NO DYSPNEA.    The history is provided by the patient.     Past Medical History:   Diagnosis Date    Chronic combined systolic and diastolic congestive heart failure     Coronary artery disease     Diabetes mellitus, type II     Hyperlipidemia     Hypertension     Primary cardiomyopathy     Valvular regurgitation     m/s MR    Ventricular tachycardia      Past Surgical History:   Procedure Laterality Date    CARDIAC DEFIBRILLATOR PLACEMENT  10/2/2008; 1/5/2015    [employer]; converted to biventricular ICD VIVA QUAD XT CRT-D [id number]: Serial No. [id number]    CARDIAC DEFIBRILLATOR PLACEMENT      CORONARY ANGIOPLASTY WITH STENT PLACEMENT  4/15/2008; 10/16/2014    LAD; RCA     Family History   Problem Relation Age of Onset    Diabetes Maternal Aunt      Social History   Substance Use Topics    Smoking status: Never Smoker    Smokeless tobacco: Never Used    Alcohol use No     Review of Systems   Constitutional: Positive for fever. Negative for activity change, appetite change, chills, diaphoresis and fatigue.   HENT: Negative for congestion, ear pain and sore throat.    Eyes: Negative for pain, discharge, redness and itching.   Respiratory: Positive for cough. Negative for chest tightness, shortness of breath, wheezing and stridor.    Cardiovascular: Negative for chest pain and palpitations.   Gastrointestinal: Negative for abdominal distention, abdominal pain, diarrhea, nausea and vomiting.   Endocrine: Negative for  polydipsia.   Genitourinary: Negative for dysuria and flank pain.   Musculoskeletal: Negative for back pain, neck pain and neck stiffness.   Skin: Negative for rash.   Neurological: Negative for dizziness, light-headedness and headaches.       Physical Exam   Initial Vitals   BP Pulse Resp Temp SpO2   03/08/17 0914 03/08/17 0914 03/08/17 0914 03/08/17 0914 03/08/17 0914   118/62 89 20 97.5 °F (36.4 °C) 97 %     Physical Exam    Vitals reviewed.  Constitutional: He appears well-developed and well-nourished.   HENT:   Head: Normocephalic and atraumatic.   Nose: Nose normal.   Mouth/Throat: Oropharynx is clear and moist.   Eyes: EOM are normal. Pupils are equal, round, and reactive to light.   Neck: Normal range of motion. Neck supple.   Cardiovascular: Normal rate, regular rhythm, normal heart sounds and intact distal pulses.   Pulmonary/Chest: Breath sounds normal. No respiratory distress. He has no wheezes. He has no rhonchi. He exhibits no tenderness.   FINE RALES IN B/L BASE OF LUNGS.  NO WHEEZING OR RHONCHI.   Abdominal: Soft. He exhibits no distension and no mass. There is no tenderness. There is no rebound and no guarding.   Musculoskeletal: Normal range of motion.   Neurological: He is alert and oriented to person, place, and time. He has normal strength and normal reflexes. He displays normal reflexes. No cranial nerve deficit or sensory deficit.   Skin: Skin is warm.         ED Course   Procedures  Labs Reviewed - No data to display                            ED Course     Clinical Impression:   The primary encounter diagnosis was Acute bronchitis, unspecified organism. A diagnosis of Cough was also pertinent to this visit.    Disposition:   Disposition: Discharged  Condition: Stable  PT IS TREATED FOR BRONCHITIS AND ADVISED TO TAKE HIS ANTIBIOTICS AND F/U WITH PCP FOR RE-EVALUATION IF COUGH PERSISTS. F/U ER WITH ANY RESPIRATORY DISTRESS.       Rajendra Aguilar MD  03/08/17 1018

## 2017-03-08 NOTE — DISCHARGE INSTRUCTIONS
Acute Bronchitis  Your healthcare provider has told you that you have acute bronchitis. Bronchitis is infection or inflammation of the bronchial tubes (airways in the lungs). Normally, air moves easily in and out of the airways. Bronchitis narrows the airways, making it harder for air to flow in and out of the lungs. This causes symptoms such as shortness of breath, coughing, and wheezing. Bronchitis can be acute or chronic. Acute means the condition comes on quickly and goes away in a short time. Chronic means a condition lasts a long time and often comes back. Read on to learn more about acute bronchitis.    What causes acute bronchitis?  Acute bronchitis almost always starts as a viral respiratory infection, such as a cold or the flu. Certain factors make it more likely for a cold or flu to turn into bronchitis. These include being very young or very old or having a heart or lung problem. Cigarette smoking also makes bronchitis more likely.  When bronchitis develops, the airways become swollen. The airways may also become infected with bacteria. This is known as a secondary infection.  Diagnosing acute bronchitis  Your healthcare provider will examine you and ask about your symptoms and health history. You may also have a sputum culture to test the fluid in your lungs. Chest X-rays may be done to look for infection in the lungs.  Treating acute bronchitis  Bronchitis usually clears up as the cold or flu goes away. You can help feel better faster by doing the following:  · Take medicine as directed. You may be told to take ibuprofen or other over-the-counter medicines. These help relieve inflammation in your bronchial tubes. Your doctor may prescribe an inhaler to help open up the bronchial tubes. Most of the time, acute bronchitis is caused by a viral infection. Antibiotics are usually not prescribed for viral infections.  · Drink plenty of fluids, such as water, juice, or warm soup. Fluids loosen mucus so  that you can cough it up. This helps you breathe more easily. Fluids also prevent dehydration.  · Make sure you get plenty of rest.  · Do not smoke. Do not allow anyone else to smoke in your home.  Recovery and follow-up  Follow up with your doctor as you are told. You will likely feel better in a week or two. But a dry cough can linger beyond that time. Let your doctor know if you still have symptoms (other than a dry cough) after 2 weeks. If youre prone to getting bronchial infections, let your doctor know. And take steps to protect yourself from future infections. These steps include stopping smoking and avoiding tobacco smoke, washing your hands often, and getting a yearly flu shot.  When to call the doctor  Call the doctor if you have any of the following:  · Fever of 100.4°F (38.0°C) higher  · Symptoms that get worse, or new symptoms  · Trouble breathing  · Symptoms that dont start to improve within a week, or within 3 days of taking antibiotics   Date Last Reviewed: 8/4/2014  © 7389-2043 [employer]. [address] [place] [address], [place] [address]. All rights reserved. This information is not intended as a substitute for professional medical care. Always follow your healthcare professional's instructions.

## 2017-03-08 NOTE — ED TRIAGE NOTES
cough that started 3 weeks ago. States he saw Dr. Mtz when it started and the medication did not work. Pt also reports chills and fever on and off over the last 3 weeks.

## 2017-03-08 NOTE — ED AVS SNAPSHOT
[facility]  [address]  [place] [address]               Trever Claros   3/8/2017  9:11 AM   ED    Description:  Male : 1940   Department:  [facility]           Your Care was Coordinated By:     Provider Role From To    Rajendra Aguilar MD Attending Provider 17 0917 --      Reason for Visit     Cough           Diagnoses this Visit        Comments    Acute bronchitis, unspecified organism    -  Primary     Cough           ED Disposition     None           To Do List            These Medications        Disp Refills Start End    azithromycin (Z-LUCIA) 250 MG tablet 6 tablet 0 3/8/2017 3/18/2017    Take 1 tablet (250 mg total) by mouth once daily. Take first 2 tablets together, then 1 every day until finished. - Oral    Pharmacy: [facility]/pharmacy #[id number] - [address] AT [facility] #: [phone]         [facility] On Call     [facility] -  Assistance  Registered nurses in the [facility] provide clinical advisement, health education, appointment booking, and other advisory services.  Call for this free service at [phone].             Medications           Message regarding Medications     Verify the changes and/or additions to your medication regime listed below are the same as discussed with your clinician today.  If any of these changes or additions are incorrect, please notify your healthcare provider.        START taking these NEW medications        Refills    azithromycin (Z-LUCIA) 250 MG tablet 0    Sig: Take 1 tablet (250 mg total) by mouth once daily. Take first 2 tablets together, then 1 every day until finished.    Class: Print    Route: Oral           Verify that the below list of medications is an accurate representation of the medications you are currently taking.  If none reported, the list may be blank. If incorrect, please contact your healthcare  provider. Carry this list with you in case of emergency.           Current Medications     allopurinol (ZYLOPRIM) 100 MG tablet Take 100 mg by mouth once daily.    aspirin (ECOTRIN) 81 MG EC tablet Take 81 mg by mouth once daily.    atorvastatin (LIPITOR) 20 MG tablet Take 1 tablet (20 mg total) by mouth every evening.    benzonatate (TESSALON) 200 MG capsule Take 1 capsule (200 mg total) by mouth 4 (four) times daily as needed for Cough.    carvedilol (COREG) 12.5 MG tablet Take 1 tablet (12.5 mg total) by mouth 2 (two) times daily.    clopidogrel (PLAVIX) 75 mg tablet Take 1 tablet (75 mg total) by mouth once daily.    escitalopram oxalate (LEXAPRO) 10 MG tablet Take 1 tablet (10 mg total) by mouth once daily. For depression    finasteride (PROSCAR) 5 mg tablet Take 5 mg by mouth once daily.    furosemide (LASIX) 20 MG tablet Take 1 tablet (20 mg) every Sunday, Monday, Wednesday, Friday    guaifenesin-codeine 100-10 mg/5 ml (CHERATUSSIN AC)  mg/5 mL syrup Take 10 mLs by mouth every 4 (four) hours as needed for Cough.    hydrALAZINE (APRESOLINE) 25 MG tablet Take 1 tablet (25 mg total) by mouth 2 (two) times daily.    isosorbide mononitrate (IMDUR) 60 MG 24 hr tablet Take 1 tablet (60 mg total) by mouth once daily.    losartan (COZAAR) 50 MG tablet Take 1 tablet (50 mg total) by mouth once daily.    nitroGLYCERIN (NITROSTAT) 0.4 MG SL tablet Place 1 tablet (0.4 mg total) under the tongue every 5 (five) minutes as needed for Chest pain (maximum 3 doses in 24 hours).    ranitidine (ZANTAC) 300 MG capsule Take 1 capsule (300 mg total) by mouth every evening.    saw palmetto 500 MG capsule Take 450 mg by mouth once daily.     tamsulosin (FLOMAX) 0.4 mg Cp24 Take 1 capsule by mouth 2 (two) times daily.     TRADJENTA 5 mg Tab tablet TAKE 1 TABLET BY MOUTH EACH DAY    URELLE 81-0.12 mg Tab Take 1 tablet by mouth as needed.     azithromycin (Z-LUCIA) 250 MG tablet Take 1 tablet (250 mg total) by mouth once daily. Take  "first 2 tablets together, then 1 every day until finished.    polyethylene glycol (GLYCOLAX) 17 gram/dose powder TAKE 17 GRAMS (1 CAPFUL) BY MOUTH NIGHTLY.           Clinical Reference Information           Your Vitals Were     BP Pulse Temp Resp Height Weight    118/62 (BP Location: Right arm, Patient Position: Lying) 89 97.5 °F (36.4 °C) (Oral) 20 5' 8" (1.727 m) 66.2 kg (146 lb)    SpO2 BMI             97% 22.2 kg/m2         Allergies as of 3/8/2017        Reactions    Ace Inhibitors     Sulfa (Sulfonamide Antibiotics) Hives      Immunizations Administered on Date of Encounter - 3/8/2017     None      ED Micro, Lab, POCT     None      ED Imaging Orders     Start Ordered       Status Ordering Provider    03/08/17 0920 03/08/17 0929  X-Ray Chest PA And Lateral  1 time imaging      Final result         Discharge Instructions         Acute Bronchitis  Your healthcare provider has told you that you have acute bronchitis. Bronchitis is infection or inflammation of the bronchial tubes (airways in the lungs). Normally, air moves easily in and out of the airways. Bronchitis narrows the airways, making it harder for air to flow in and out of the lungs. This causes symptoms such as shortness of breath, coughing, and wheezing. Bronchitis can be acute or chronic. Acute means the condition comes on quickly and goes away in a short time. Chronic means a condition lasts a long time and often comes back. Read on to learn more about acute bronchitis.    What causes acute bronchitis?  Acute bronchitis almost always starts as a viral respiratory infection, such as a cold or the flu. Certain factors make it more likely for a cold or flu to turn into bronchitis. These include being very young or very old or having a heart or lung problem. Cigarette smoking also makes bronchitis more likely.  When bronchitis develops, the airways become swollen. The airways may also become infected with bacteria. This is known as a secondary " infection.  Diagnosing acute bronchitis  Your healthcare provider will examine you and ask about your symptoms and health history. You may also have a sputum culture to test the fluid in your lungs. Chest X-rays may be done to look for infection in the lungs.  Treating acute bronchitis  Bronchitis usually clears up as the cold or flu goes away. You can help feel better faster by doing the following:  · Take medicine as directed. You may be told to take ibuprofen or other over-the-counter medicines. These help relieve inflammation in your bronchial tubes. Your doctor may prescribe an inhaler to help open up the bronchial tubes. Most of the time, acute bronchitis is caused by a viral infection. Antibiotics are usually not prescribed for viral infections.  · Drink plenty of fluids, such as water, juice, or warm soup. Fluids loosen mucus so that you can cough it up. This helps you breathe more easily. Fluids also prevent dehydration.  · Make sure you get plenty of rest.  · Do not smoke. Do not allow anyone else to smoke in your home.  Recovery and follow-up  Follow up with your doctor as you are told. You will likely feel better in a week or two. But a dry cough can linger beyond that time. Let your doctor know if you still have symptoms (other than a dry cough) after 2 weeks. If youre prone to getting bronchial infections, let your doctor know. And take steps to protect yourself from future infections. These steps include stopping smoking and avoiding tobacco smoke, washing your hands often, and getting a yearly flu shot.  When to call the doctor  Call the doctor if you have any of the following:  · Fever of 100.4°F (38.0°C) higher  · Symptoms that get worse, or new symptoms  · Trouble breathing  · Symptoms that dont start to improve within a week, or within 3 days of taking antibiotics   Date Last Reviewed: 8/4/2014  © 9246-6981 [employer]. [address] [place] [address], [place] [address]. All rights  reserved. This information is not intended as a substitute for professional medical care. Always follow your healthcare professional's instructions.          Your Scheduled Appointments     Mar 27, 2017 10:40 AM CDT   New Patient with MD [place] [facility] - Cardiology ([place])    [address], [address]  [place] [id number] [phone]              [facility] Sign-Up     Activating your [facility] account is as easy as 1-2-3!     1) Visit [email or web address], select Sign Up Now, enter this activation code and your date of birth, then select Next.  [email or web address]  Expires: 3/26/2017 12:59 PM      2) Create a username and password to use when you visit [facility] in the future and select a security question in case you lose your password and select Next.    3) Enter your e-mail address and click Sign Up!    Additional Information  If you have questions, please e-mail [email or web address] or call [phone] to talk to our [facility] staff. Remember, [facility] is NOT to be used for urgent needs. For medical emergencies, dial 911.          [facility] complies with applicable Federal civil rights laws and does not discriminate on the basis of race, color, national origin, age, disability, or sex.        Language Assistance Services     ATTENTION: Language assistance services are available, free of charge. Please call [phone].      ATENCIÓN: Si habla kseniaañol, tiene a mason disposición servicios gratuitos de asistencia lingüística. Llame al [phone].     RADHA Ý: N?u b?n nói Ti?ng Vi?t, có các d?ch v? h? tr? ngôn ng? mi?n phí doh cho b?n. G?i s? [phone].

## 2017-03-20 ENCOUNTER — HOSPITAL ENCOUNTER (EMERGENCY)
Facility: HOSPITAL | Age: 77
Discharge: HOME | End: 2017-03-20
Attending: FAMILY MEDICINE
Payer: MEDICARE

## 2017-03-20 VITALS
WEIGHT: 140 LBS | DIASTOLIC BLOOD PRESSURE: 65 MMHG | OXYGEN SATURATION: 99 % | TEMPERATURE: 98 F | HEART RATE: 80 BPM | HEIGHT: 68 IN | RESPIRATION RATE: 18 BRPM | SYSTOLIC BLOOD PRESSURE: 109 MMHG | BODY MASS INDEX: 21.22 KG/M2

## 2017-03-20 DIAGNOSIS — I50.43 ACUTE ON CHRONIC COMBINED SYSTOLIC AND DIASTOLIC CONGESTIVE HEART FAILURE: Primary | ICD-10-CM

## 2017-03-20 LAB
ALBUMIN SERPL BCP-MCNC: 4.1 G/DL
ALP SERPL-CCNC: 192 IU/L
ALT SERPL W/O P-5'-P-CCNC: 49 IU/L
ANION GAP SERPL CALC-SCNC: 10 MMOL/L
AST SERPL-CCNC: 38 IU/L
BASOPHILS # BLD AUTO: 0.02 K/UL
BASOPHILS NFR BLD: 0.5 %
BILIRUB SERPL-MCNC: 0.8 MG/DL
BUN SERPL-MCNC: 38 MG/DL
CALCIUM SERPL-MCNC: 9.2 MG/DL
CHLORIDE SERPL-SCNC: 108 MMOL/L
CO2 SERPL-SCNC: 24 MMOL/L
CREAT SERPL-MCNC: 1.61 MG/DL
DIFFERENTIAL METHOD: ABNORMAL
EOSINOPHIL # BLD AUTO: 0.1 K/UL
EOSINOPHIL NFR BLD: 1.9 %
ERYTHROCYTE [DISTWIDTH] IN BLOOD BY AUTOMATED COUNT: 13.5 %
EST. GFR  (AFRICAN AMERICAN): 47.6 ML/MIN/1.73 M^2
EST. GFR  (NON AFRICAN AMERICAN): 41.2 ML/MIN/1.73 M^2
GLUCOSE SERPL-MCNC: 197 MG/DL
HCT VFR BLD AUTO: 39.4 %
HGB BLD-MCNC: 12.7 G/DL
LYMPHOCYTES # BLD AUTO: 1.7 K/UL
LYMPHOCYTES NFR BLD: 39.7 %
MCH RBC QN AUTO: 31.3 PG
MCHC RBC AUTO-ENTMCNC: 32.2 %
MCV RBC AUTO: 97 FL
MONOCYTES # BLD AUTO: 0.9 K/UL
MONOCYTES NFR BLD: 20.4 %
NEUTROPHILS # BLD AUTO: 1.6 K/UL
NEUTROPHILS NFR BLD: 37.3 %
NT-PROBNP: 3270 PG/ML
PLATELET # BLD AUTO: 149 K/UL
PMV BLD AUTO: 11.1 FL
POTASSIUM SERPL-SCNC: 4.7 MMOL/L
PROT SERPL-MCNC: 7.6 G/DL
RBC # BLD AUTO: 4.06 M/UL
SODIUM SERPL-SCNC: 142 MMOL/L
TROPONIN I SERPL DL<=0.01 NG/ML-MCNC: 0.04 NG/ML
WBC # BLD AUTO: 4.21 K/UL

## 2017-03-20 PROCEDURE — 93005 ELECTROCARDIOGRAM TRACING: CPT

## 2017-03-20 PROCEDURE — 85025 COMPLETE CBC W/AUTO DIFF WBC: CPT

## 2017-03-20 PROCEDURE — 96374 THER/PROPH/DIAG INJ IV PUSH: CPT

## 2017-03-20 PROCEDURE — 80053 COMPREHEN METABOLIC PANEL: CPT

## 2017-03-20 PROCEDURE — 25000003 PHARM REV CODE 250: Performed by: FAMILY MEDICINE

## 2017-03-20 PROCEDURE — 63600175 PHARM REV CODE 636 W HCPCS: Performed by: FAMILY MEDICINE

## 2017-03-20 PROCEDURE — 99284 EMERGENCY DEPT VISIT MOD MDM: CPT | Mod: 25

## 2017-03-20 PROCEDURE — 84484 ASSAY OF TROPONIN QUANT: CPT

## 2017-03-20 PROCEDURE — 83880 ASSAY OF NATRIURETIC PEPTIDE: CPT

## 2017-03-20 RX ORDER — FOSINOPRIL SODIUM 20 MG/1
20 TABLET ORAL DAILY
COMMUNITY
End: 2017-03-27 | Stop reason: SDUPTHER

## 2017-03-20 RX ORDER — FUROSEMIDE 10 MG/ML
40 INJECTION INTRAMUSCULAR; INTRAVENOUS
Status: COMPLETED | OUTPATIENT
Start: 2017-03-20 | End: 2017-03-20

## 2017-03-20 RX ADMIN — FUROSEMIDE 40 MG: 10 INJECTION, SOLUTION INTRAMUSCULAR; INTRAVENOUS at 07:03

## 2017-03-20 RX ADMIN — NITROGLYCERIN 1 INCH: 20 OINTMENT TOPICAL at 07:03

## 2017-03-20 NOTE — ED PROVIDER NOTES
"Encounter Date: 3/20/2017       History     Chief Complaint   Patient presents with    Shortness of Breath     Pt states started with SOB approx 3 hours PTA.  Took po lasix PTA.  Pt states "I been like this so much I know I need that lasix in me quick."  Pt denies CP.      Review of patient's allergies indicates:   Allergen Reactions    Sulfa (sulfonamide antibiotics) Hives     HPI Comments: The patient's is 76-year-old white male with a history of combined systolic and diastolic heart failure with a biventricular pacemaker/defibrillator who developed dyspnea about 3 hours prior to admission.  Patient took in more fluid than recommended over the weekend.  The patient took his by mouth Lasix at home comes ED stating that he's had some many episodes like this and knows he needs IV Lasix quickly.  The patient has had no edema or weight gain noted recently.  He states "I'll never swell".  Denies chest or abdomen pain.  The patient was switched from Monopril to losartan by his PCP due to a chronic nonproductive cough but the patient has not made the switch yet because he wanted to talk to his cardiologist about it.    The history is provided by the patient.     Past Medical History:   Diagnosis Date    Chronic combined systolic and diastolic congestive heart failure     Coronary artery disease     Diabetes mellitus, type II     Hyperlipidemia     Hypertension     Primary cardiomyopathy     Valvular regurgitation     m/s MR    Ventricular tachycardia      Past Surgical History:   Procedure Laterality Date    CARDIAC DEFIBRILLATOR PLACEMENT  10/2/2008; 1/5/2015    [employer]; converted to biventricular ICD VIVA QUAD XT CRT-D ERWA1G3: Serial No. [id number]    CARDIAC DEFIBRILLATOR PLACEMENT      CORONARY ANGIOPLASTY WITH STENT PLACEMENT  4/15/2008; 10/16/2014    LAD; RCA     Family History   Problem Relation Age of Onset    Diabetes Maternal Aunt      Social History   Substance Use Topics    Smoking status: " Never Smoker    Smokeless tobacco: Never Used    Alcohol use No     Review of Systems   Constitutional: Negative for fever.   Respiratory: Positive for shortness of breath. Negative for wheezing and stridor.    Cardiovascular: Negative for chest pain and palpitations.   Gastrointestinal: Negative for nausea.   Skin: Negative for color change.   All other systems reviewed and are negative.      Physical Exam   Initial Vitals   BP Pulse Resp Temp SpO2   03/20/17 0659 03/20/17 0659 03/20/17 0659 03/20/17 0659 03/20/17 0659   124/96 113 26 97 °F (36.1 °C) 96 %     Physical Exam    Nursing note and vitals reviewed.  Constitutional: He appears well-developed and well-nourished. He is not diaphoretic.   Thin black male with increased work of breathing and some tachypnea.   HENT:   Head: Normocephalic.   Eyes: Pupils are equal, round, and reactive to light.   Cardiovascular: An irregularly irregular rhythm present.   Murmur heard.   Systolic murmur is present with a grade of 2/6   Pulmonary/Chest: He has rales.   Abdominal: He exhibits no distension. There is no tenderness.   Musculoskeletal: Normal range of motion. He exhibits no edema.   Neurological: He is alert and oriented to person, place, and time.         ED Course   Procedures  Labs Reviewed   TROPONIN I   CBC W/ AUTO DIFFERENTIAL   COMPREHENSIVE METABOLIC PANEL   NT-PRO NATRIURETIC PEPTIDE     EKG Readings: (Independently Interpreted)   Initial Reading: No STEMI. Rhythm: Paced Rhythm. Heart Rate: 98. Ectopy: Rare PVCs. ST Segments: Normal ST Segments. T Waves: Normal. Clinical Impression: Paced Rhythm with PVCs          Medical Decision Making:   Clinical Tests:   Lab Tests: Ordered and Reviewed       <> Summary of Lab: Patient's troponin and BNP PE are better patient has some developing pulmonary edema on x-ray.    The ED for 4 hours are lower than previous levels  Radiological Study: Ordered and Reviewed  ED Management:  Patient's chest x-ray showed just  (pulmonary edema but the patient was observed in the ED 4 hours after Lasix and has diuresed and feels back to normal.  Walks back and forth to toe without still without dyspnea and is maintaining 95-99% O2 sats on room air.  He is back to baseline mental go home.  She may take another 20 mg Lasix this afternoon after he gets home.  Advised him to go ahead and switch from the Monopril to start and a follow-up with Dr. Mtz after on regimen for 2-3 weeks.  Follow-up with cardiologist here from CHF symptoms or recurrent.                   ED Course     Clinical Impression:   The encounter diagnosis was Acute on chronic combined systolic and diastolic congestive heart failure.          RHEA Langston MD  03/20/17 9827

## 2017-03-20 NOTE — ED NOTES
Pt resting comfortably at this time. Skin warm and dry. Resp. Even and unlabored. States he is feeling good. Dr. Langston aware. Family at bedside.

## 2017-03-20 NOTE — ED AVS SNAPSHOT
[facility]  [address] [place] [address]               Trever Claros   3/20/2017  6:58 AM   ED    Description:  Male : 1940   Department:  [facility]           Your Care was Coordinated By:     Provider Role From Reynaldo Langston MD Attending Provider 17 0700 --      Reason for Visit     Shortness of Breath           Diagnoses this Visit        Comments    Acute on chronic combined systolic and diastolic congestive heart failure    -  Primary       ED Disposition     ED Disposition Condition Comment    Discharge             To Do List           Follow-up Information     Follow up with Wiliam Mtz MD In 2 weeks.    Specialty:  Family Medicine    Contact information:    [address] [facility] [address]  [phone]        [facility] On Call     [facility] Care Line -  Assistance  Registered nurses in the [facility] provide clinical advisement, health education, appointment booking, and other advisory services.  Call for this free service at [phone].             Medications           Message regarding Medications     Verify the changes and/or additions to your medication regime listed below are the same as discussed with your clinician today.  If any of these changes or additions are incorrect, please notify your healthcare provider.        These medications were administered today        Dose Freq    furosemide injection 40 mg 40 mg ED 1 Time    Sig: Inject 4 mLs (40 mg total) into the vein ED 1 Time.    Class: Normal    Route: Intravenous    nitroGLYCERIN 2% TD oint ointment 1 inch 1 inch ED 1 Time    Sig: Apply 1 inch topically ED 1 Time.    Class: Normal    Route: Topical           Verify that the below list of medications is an accurate representation of the medications you are currently taking.  If none reported, the list may be blank. If incorrect, please contact your healthcare provider. Carry  this list with you in case of emergency.           Current Medications     allopurinol (ZYLOPRIM) 100 MG tablet Take 100 mg by mouth once daily.    aspirin (ECOTRIN) 81 MG EC tablet Take 81 mg by mouth once daily.    atorvastatin (LIPITOR) 20 MG tablet Take 1 tablet (20 mg total) by mouth every evening.    carvedilol (COREG) 12.5 MG tablet Take 1 tablet (12.5 mg total) by mouth 2 (two) times daily.    clopidogrel (PLAVIX) 75 mg tablet Take 1 tablet (75 mg total) by mouth once daily.    fosinopril (MONOPRIL) 20 MG tablet Take 20 mg by mouth once daily.    furosemide (LASIX) 20 MG tablet Take 1 tablet (20 mg) every Sunday, Monday, Wednesday, Friday    guaifenesin-codeine 100-10 mg/5 ml (CHERATUSSIN AC)  mg/5 mL syrup Take 10 mLs by mouth every 4 (four) hours as needed for Cough.    hydrALAZINE (APRESOLINE) 25 MG tablet Take 1 tablet (25 mg total) by mouth 2 (two) times daily.    isosorbide mononitrate (IMDUR) 60 MG 24 hr tablet Take 1 tablet (60 mg total) by mouth once daily.    polyethylene glycol (GLYCOLAX) 17 gram/dose powder TAKE 17 GRAMS (1 CAPFUL) BY MOUTH NIGHTLY.    saw palmetto 500 MG capsule Take 450 mg by mouth once daily.     tamsulosin (FLOMAX) 0.4 mg Cp24 Take 1 capsule by mouth 2 (two) times daily.     TRADJENTA 5 mg Tab tablet TAKE 1 TABLET BY MOUTH EACH DAY    URELLE 81-0.12 mg Tab Take 1 tablet by mouth as needed.     benzonatate (TESSALON) 200 MG capsule Take 1 capsule (200 mg total) by mouth 4 (four) times daily as needed for Cough.    escitalopram oxalate (LEXAPRO) 10 MG tablet Take 1 tablet (10 mg total) by mouth once daily. For depression    finasteride (PROSCAR) 5 mg tablet Take 5 mg by mouth once daily.    furosemide injection 40 mg Inject 4 mLs (40 mg total) into the vein ED 1 Time.    losartan (COZAAR) 50 MG tablet Take 1 tablet (50 mg total) by mouth once daily.    nitroGLYCERIN (NITROSTAT) 0.4 MG SL tablet Place 1 tablet (0.4 mg total) under the tongue every 5 (five) minutes as  "needed for Chest pain (maximum 3 doses in 24 hours).    nitroGLYCERIN 2% TD oint ointment 1 inch Apply 1 inch topically ED 1 Time.    ranitidine (ZANTAC) 300 MG capsule Take 1 capsule (300 mg total) by mouth every evening.           Clinical Reference Information           Your Vitals Were     BP Pulse Temp Resp Height Weight    110/71 (BP Location: Left arm, Patient Position: Sitting, BP Method: Automatic) 98 97 °F (36.1 °C) (Oral) 18 5' 8" (1.727 m) 63.5 kg (140 lb)    SpO2 BMI             97% 21.29 kg/m2         Allergies as of 3/20/2017        Reactions    Sulfa (Sulfonamide Antibiotics) Hives      Immunizations Administered on Date of Encounter - 3/20/2017     None      ED Micro, Lab, POCT     Start Ordered       Status Ordering Provider    03/20/17 0702 03/20/17 0702  Troponin I  STAT      Final result     03/20/17 0702 03/20/17 0702    STAT,   Status:  Canceled      Canceled     03/20/17 0702 03/20/17 0702  CBC auto differential  STAT      Final result     03/20/17 0702 03/20/17 0702  Comprehensive metabolic panel  STAT      Final result     03/20/17 0702 03/20/17 0702  NT-Pro Natriuretic Peptide  Once      Final result       ED Imaging Orders     Start Ordered       Status Ordering Provider    03/20/17 0702 03/20/17 0702  X-Ray Chest AP Portable  1 time imaging      Final result       Discharge References/Attachments     HEART FAILURE, DISCHARGE INSTRUCTIONS FOR (ENGLISH)      Your Scheduled Appointments     Mar 27, 2017 10:40 AM CDT   New Patient with MD [place] Posada - Cardiology ([place])    [address], [address]  [facility] [place] [address]   [phone]              [facility] Sign-Up     Activating your [facility] account is as easy as 1-2-3!     1) Visit [email or web address], select Sign Up Now, enter this activation code and your date of birth, then select Next.  [email or web address]  Expires: 3/26/2017  1:59 PM      2) Create a username and password to use when you visit [facility] in the future " and select a security question in case you lose your password and select Next.    3) Enter your e-mail address and click Sign Up!    Additional Information  If you have questions, please e-mail [email or web address] or call [phone] to talk to our [facility] staff. Remember, [facility] is NOT to be used for urgent needs. For medical emergencies, dial 911.          [facility] complies with applicable Federal civil rights laws and does not discriminate on the basis of race, color, national origin, age, disability, or sex.        Language Assistance Services     ATTENTION: Language assistance services are available, free of charge. Please call [phone].      ATENCIÓN: Si habla sarah, tiene a mason disposición servicios gratuitos de asistencia lingüística. Llame al [phone].     CHÚ Ý: N?u b?n nói Ti?ng Vi?t, có các d?ch v? h? tr? ngôn ng? mi?n phí dành cho b?n. G?i s? [phone].

## 2017-03-20 NOTE — ED NOTES
Pt states he is feeling better at this time, denies pain. Skin warm and dry. Resp. Even and unlabored. Pt ambulated to restroom with family at side.

## 2017-03-27 ENCOUNTER — CLINICAL SUPPORT (OUTPATIENT)
Dept: ELECTROPHYSIOLOGY | Facility: CLINIC | Age: 77
End: 2017-03-27
Payer: MEDICARE

## 2017-03-27 ENCOUNTER — OFFICE VISIT (OUTPATIENT)
Dept: CARDIOLOGY | Facility: CLINIC | Age: 77
End: 2017-03-27
Payer: MEDICARE

## 2017-03-27 VITALS
SYSTOLIC BLOOD PRESSURE: 109 MMHG | DIASTOLIC BLOOD PRESSURE: 72 MMHG | HEIGHT: 66 IN | WEIGHT: 140 LBS | BODY MASS INDEX: 22.5 KG/M2

## 2017-03-27 DIAGNOSIS — Z98.61 CAD S/P PERCUTANEOUS CORONARY ANGIOPLASTY: Chronic | ICD-10-CM

## 2017-03-27 DIAGNOSIS — Z95.810 ICD (IMPLANTABLE CARDIOVERTER-DEFIBRILLATOR) IN PLACE: ICD-10-CM

## 2017-03-27 DIAGNOSIS — I25.10 CAD S/P PERCUTANEOUS CORONARY ANGIOPLASTY: Chronic | ICD-10-CM

## 2017-03-27 DIAGNOSIS — I47.20 VENTRICULAR TACHYARRHYTHMIA: ICD-10-CM

## 2017-03-27 DIAGNOSIS — I34.0 MITRAL VALVE INSUFFICIENCY, UNSPECIFIED ETIOLOGY: ICD-10-CM

## 2017-03-27 DIAGNOSIS — I47.20 VENTRICULAR TACHYCARDIA: Primary | ICD-10-CM

## 2017-03-27 DIAGNOSIS — I25.5 CARDIOMYOPATHY, ISCHEMIC: ICD-10-CM

## 2017-03-27 PROCEDURE — 93296 REM INTERROG EVL PM/IDS: CPT | Mod: PBBFAC | Performed by: INTERNAL MEDICINE

## 2017-03-27 PROCEDURE — 93295 DEV INTERROG REMOTE 1/2/MLT: CPT | Mod: ,,, | Performed by: INTERNAL MEDICINE

## 2017-03-27 PROCEDURE — 99999 PR PBB SHADOW E&M-EST. PATIENT-LVL II: CPT | Mod: PBBFAC,,, | Performed by: INTERNAL MEDICINE

## 2017-03-27 PROCEDURE — 99213 OFFICE O/P EST LOW 20 MIN: CPT | Mod: S$PBB,,, | Performed by: INTERNAL MEDICINE

## 2017-03-27 RX ORDER — ISOSORBIDE MONONITRATE 60 MG/1
60 TABLET, EXTENDED RELEASE ORAL DAILY
Qty: 90 TABLET | Refills: 3 | Status: SHIPPED | OUTPATIENT
Start: 2017-03-27 | End: 2018-01-09 | Stop reason: SINTOL

## 2017-03-27 RX ORDER — NITROGLYCERIN 0.4 MG/1
0.4 TABLET SUBLINGUAL EVERY 5 MIN PRN
Qty: 20 TABLET | Refills: 12 | Status: SHIPPED | OUTPATIENT
Start: 2017-03-27 | End: 2018-03-27

## 2017-03-27 RX ORDER — ATORVASTATIN CALCIUM 20 MG/1
20 TABLET, FILM COATED ORAL NIGHTLY
Qty: 90 TABLET | Refills: 3 | Status: SHIPPED | OUTPATIENT
Start: 2017-03-27 | End: 2018-03-27

## 2017-03-27 RX ORDER — CARVEDILOL 12.5 MG/1
12.5 TABLET ORAL 2 TIMES DAILY
Qty: 180 TABLET | Refills: 3 | Status: SHIPPED | OUTPATIENT
Start: 2017-03-27 | End: 2017-09-19 | Stop reason: SINTOL

## 2017-03-27 RX ORDER — CLOPIDOGREL BISULFATE 75 MG/1
75 TABLET ORAL DAILY
Qty: 90 TABLET | Refills: 3 | Status: SHIPPED | OUTPATIENT
Start: 2017-03-27

## 2017-03-27 RX ORDER — FOSINOPRIL SODIUM 20 MG/1
20 TABLET ORAL DAILY
Qty: 90 TABLET | Refills: 3 | Status: SHIPPED | OUTPATIENT
Start: 2017-03-27 | End: 2017-10-05

## 2017-03-27 NOTE — PROGRESS NOTES
Subjective:   Patient ID:  Trever Claros is a 76 y.o. male who presents for follow up of Coronary Artery Disease; Hyperlipidemia; Hypertension; and Congestive Heart Failure      HPI:       Trever Claros 76 y.o. male is here follow up and feeling well without any new complaints. He is a former patient of Dr. Oliva and currently followed by Dr. Roe with hx of HTN, DM, HLP, CKD, CAD s/p PCI 2008 and 2014, HFrEF with ICM with functional MR with last EF 15% (down from 30% last year), s/p CRT-D (BiV upgrade Jan 2015 by Dr. Myers), hx of Vtach, GERD who presents for f/u of recent admission for CHF due to dietary indiscretion. He mistakenly drank his wife high salt soda.      He is doing well from a HF perspective and is Stage C HF with NYHA FC I symptoms. No limitations on activities from cardiac symptoms. He denies CP, SOB/JESUS, orthopnea, PND, syncope, palps, LE edema, claudication. He is compliant with his meds and is very active and gardens. He was hospitalized 3/2017for ADHF which was attributed to dietary indiscretion from soft a drink.         He was temporarily placed on losartan but he did not tolerate it. He had both dyspnea and palpitation. He is back on monopril without any issues.              Patient Active Problem List    Diagnosis Date Noted    Depression 02/13/2017    Essential hypertension 07/22/2016    Gastroesophageal reflux disease 07/22/2016    Benign non-nodular prostatic hyperplasia with lower urinary tract symptoms 10/22/2015    Liver mass, left lobe 06/12/2015    Chronic kidney disease, stage 3 10/09/2014    CAD S/P percutaneous coronary angioplasty          RCA PCI 10/2014   4.0 x 22 mm Resolute ÁLVARO   Dr. Oliva             Diabetes mellitus type 2, noninsulin dependent     Ventricular tachycardia     Status post coronary artery stent placement 09/25/2012    Ischemic dilated cardiomyopathy 09/25/2012    Chronic combined systolic and diastolic congestive heart failure  09/25/2012    Ventricular tachyarrhythmia 09/25/2012    AICD (automatic cardioverter/defibrillator) present 09/25/2012    Mitral regurgitation 09/25/2012           Right Arm BP - Sitting: (P) 114/70  Left Arm BP - Sitting: (P) 109/72        LABS    LAST HbA1c  Lab Results   Component Value Date    HGBA1C 6.9 (H) 12/15/2014       Lipid panel  Lab Results   Component Value Date    CHOL 127 12/15/2014    CHOL 130 06/17/2014    CHOL 117 (L) 11/25/2008     Lab Results   Component Value Date    HDL 35 (L) 12/15/2014    HDL 32 (L) 06/17/2014    HDL 43 11/25/2008     Lab Results   Component Value Date    LDLCALC 73.0 12/15/2014    LDLCALC 64.2 06/17/2014    LDLCALC 64.6 11/25/2008     Lab Results   Component Value Date    TRIG 95 12/15/2014    TRIG 169 (H) 06/17/2014    TRIG 47 11/25/2008     Lab Results   Component Value Date    CHOLHDL 27.6 12/15/2014    CHOLHDL 24.6 06/17/2014    CHOLHDL 36.8 11/25/2008            Review of Systems   Constitution: Negative for diaphoresis, weakness, night sweats, weight gain and weight loss.   HENT: Negative for congestion.    Eyes: Negative for blurred vision, discharge and double vision.   Cardiovascular: Negative for chest pain, claudication, cyanosis, dyspnea on exertion, irregular heartbeat, leg swelling, near-syncope, orthopnea, palpitations, paroxysmal nocturnal dyspnea and syncope.   Respiratory: Negative for cough, shortness of breath and wheezing.    Endocrine: Negative for cold intolerance, heat intolerance and polyphagia.   Hematologic/Lymphatic: Negative for adenopathy and bleeding problem. Does not bruise/bleed easily.   Skin: Negative for dry skin and nail changes.   Musculoskeletal: Negative for arthritis, back pain, falls, joint pain, myalgias and neck pain.   Gastrointestinal: Negative for bloating, abdominal pain, change in bowel habit and constipation.   Genitourinary: Negative for bladder incontinence, dysuria, flank pain, genital sores and missed menses.    Neurological: Negative for aphonia, brief paralysis, difficulty with concentration and dizziness.   Psychiatric/Behavioral: Negative for altered mental status and memory loss. The patient does not have insomnia.    Allergic/Immunologic: Negative for environmental allergies.       Objective:   Physical Exam   Constitutional: He is oriented to person, place, and time. He appears well-developed and well-nourished. He is not intubated.   HENT:   Head: Normocephalic and atraumatic.   Right Ear: External ear normal.   Left Ear: External ear normal.   Mouth/Throat: Oropharynx is clear and moist.   Eyes: Conjunctivae and EOM are normal. Pupils are equal, round, and reactive to light. Right eye exhibits no discharge. Left eye exhibits no discharge. No scleral icterus.   Neck: Normal range of motion. Neck supple. Normal carotid pulses, no hepatojugular reflux and no JVD present. Carotid bruit is not present. No tracheal deviation present. No thyromegaly present.   Cardiovascular: Normal rate, regular rhythm, S1 normal and S2 normal.   No extrasystoles are present. PMI is not displaced.  Exam reveals no gallop, no S3, no distant heart sounds, no friction rub and no midsystolic click.    Murmur heard.   Medium-pitched blowing holosystolic murmur is present with a grade of 2/6  at the lower left sternal border, apex  Pulses:       Carotid pulses are 2+ on the right side, and 2+ on the left side.       Radial pulses are 2+ on the right side, and 2+ on the left side.        Femoral pulses are 2+ on the right side, and 2+ on the left side.       Popliteal pulses are 2+ on the right side, and 2+ on the left side.        Dorsalis pedis pulses are 1+ on the right side, and 1+ on the left side.        Posterior tibial pulses are 1+ on the right side, and 1+ on the left side.   Pulmonary/Chest: Effort normal and breath sounds normal. No accessory muscle usage or stridor. No apnea, no tachypnea and no bradypnea. He is not intubated. No  respiratory distress. He has no decreased breath sounds. He has no wheezes. He has no rales. He exhibits no tenderness and no bony tenderness.   Abdominal: He exhibits no distension, no pulsatile liver, no abdominal bruit, no ascites, no pulsatile midline mass and no mass. There is no tenderness. There is no rebound and no guarding.   Musculoskeletal: Normal range of motion. He exhibits no edema or tenderness.   Lymphadenopathy:     He has no cervical adenopathy.   Neurological: He is alert and oriented to person, place, and time. He has normal reflexes. No cranial nerve deficit. Coordination normal.   Skin: Skin is warm. No rash noted. No erythema. No pallor.   Psychiatric: He has a normal mood and affect. His behavior is normal. Judgment and thought content normal.       Assessment:     1. Ventricular tachycardia    2. Cardiomyopathy, ischemic    3. Mitral valve insufficiency, unspecified etiology    4. Ventricular tachyarrhythmia    5. CAD S/P percutaneous coronary angioplasty        Plan:         Continue with current medical plan and lifestyle changes.  Return sooner for concerns or questions. If symptoms persist go to the ED  I have reviewed all pertinent data on this patient       Low salt diet   Daily weight  Take an extra dose of lasix for 3 lbs weight gain        Resume care with Dr. Roe  Consider a referral to [facility] clinic for advanced CHF   Follow up with Dr. Myers as scheduled            Follow up as scheduled. Return sooner for concerns or questions            He expressed verbal understanding and agreed with the plan        Patient's Medications   New Prescriptions    No medications on file   Previous Medications    ALLOPURINOL (ZYLOPRIM) 100 MG TABLET    Take 100 mg by mouth once daily.    ASPIRIN (ECOTRIN) 81 MG EC TABLET    Take 81 mg by mouth once daily.    BENZONATATE (TESSALON) 200 MG CAPSULE    Take 1 capsule (200 mg total) by mouth 4 (four) times daily as needed for Cough.    ESCITALOPRAM  OXALATE (LEXAPRO) 10 MG TABLET    Take 1 tablet (10 mg total) by mouth once daily. For depression    FINASTERIDE (PROSCAR) 5 MG TABLET    Take 5 mg by mouth once daily.    FUROSEMIDE (LASIX) 20 MG TABLET    Take 1 tablet (20 mg) every Sunday, Monday, Wednesday, Friday    GUAIFENESIN-CODEINE 100-10 MG/5 ML (CHERATUSSIN AC)  MG/5 ML SYRUP    Take 10 mLs by mouth every 4 (four) hours as needed for Cough.    POLYETHYLENE GLYCOL (GLYCOLAX) 17 GRAM/DOSE POWDER    TAKE 17 GRAMS (1 CAPFUL) BY MOUTH NIGHTLY.    RANITIDINE (ZANTAC) 300 MG CAPSULE    Take 1 capsule (300 mg total) by mouth every evening.    SAW PALMETTO 500 MG CAPSULE    Take 450 mg by mouth once daily.     TAMSULOSIN (FLOMAX) 0.4 MG CP24    Take 1 capsule by mouth 2 (two) times daily.     TRADJENTA 5 MG TAB TABLET    TAKE 1 TABLET BY MOUTH EACH DAY    URELLE 81-0.12 MG TAB    Take 1 tablet by mouth as needed.    Modified Medications    Modified Medication Previous Medication    ATORVASTATIN (LIPITOR) 20 MG TABLET atorvastatin (LIPITOR) 20 MG tablet       Take 1 tablet (20 mg total) by mouth every evening.    Take 1 tablet (20 mg total) by mouth every evening.    CARVEDILOL (COREG) 12.5 MG TABLET carvedilol (COREG) 12.5 MG tablet       Take 1 tablet (12.5 mg total) by mouth 2 (two) times daily.    Take 1 tablet (12.5 mg total) by mouth 2 (two) times daily.    CLOPIDOGREL (PLAVIX) 75 MG TABLET clopidogrel (PLAVIX) 75 mg tablet       Take 1 tablet (75 mg total) by mouth once daily.    Take 1 tablet (75 mg total) by mouth once daily.    FOSINOPRIL (MONOPRIL) 20 MG TABLET fosinopril (MONOPRIL) 20 MG tablet       Take 1 tablet (20 mg total) by mouth once daily.    Take 20 mg by mouth once daily.    ISOSORBIDE MONONITRATE (IMDUR) 60 MG 24 HR TABLET isosorbide mononitrate (IMDUR) 60 MG 24 hr tablet       Take 1 tablet (60 mg total) by mouth once daily.    Take 1 tablet (60 mg total) by mouth once daily.    NITROGLYCERIN (NITROSTAT) 0.4 MG SL TABLET nitroGLYCERIN  (NITROSTAT) 0.4 MG SL tablet       Place 1 tablet (0.4 mg total) under the tongue every 5 (five) minutes as needed for Chest pain (maximum 3 doses in 24 hours).    Place 1 tablet (0.4 mg total) under the tongue every 5 (five) minutes as needed for Chest pain (maximum 3 doses in 24 hours).   Discontinued Medications    HYDRALAZINE (APRESOLINE) 25 MG TABLET    Take 1 tablet (25 mg total) by mouth 2 (two) times daily.    LOSARTAN (COZAAR) 50 MG TABLET    Take 1 tablet (50 mg total) by mouth once daily.

## 2017-03-27 NOTE — PATIENT INSTRUCTIONS
Heart Disease Education    The heart beats 60 to 100 times per minute, 24 hours a day. This equals almost 1000,000 times a day. It pumps blood with oxygen and nutrients to the tissues and organs of the body. But the heart is a muscle and needs its own supply of blood. Blood flow to the heart is supplied by the coronary arteries. Coronary artery disease (atherosclerosis) is a result of cholesterol, saturated fat, and calcium deposits (plaques) that build up inside the walls. This causes inflammation within the coronary arteries. These plaques narrow the artery and reduce blood flow to the heart muscle. The reduction in blood flow to the heart muscle decreases oxygen supply to the heart. If the narrowing is significant enough, the oxygen supply to one or more regions of the heart can be temporarily or permanently shut down. This can cause chest pain, and possibly death of heart tissue (heart attack).  Types of chest pain  Angina is the name for pain in the heart muscle. Angina is a warning sign of serious heart disease. When untreated it can lead to a heart attack, also known as acute myocardial infarction, or AMI. Angina occurs when there is not enough blood and oxygen flowing to the heart for the amount of work it is doing. This most often happens during physical exertion, when the heart is working hardest. It is usually relieved by rest or nitroglycerin. Angina may also occur after a large meal when extra blood is sent to the digestive organs and less goes to the heart. In the case of advanced or unstable heart disease, angina can occur at rest or awaken you from sleep. Angina usually lasts from a few minutes up to 20 minutes or more. When treated early, the effects of angina can be reversed without permanent damage to the heart. Angina is a serious condition and needs to be evaluated by a medical professional immediately.  There are two types of angina -- stable and unstable:  · Stable angina usually occurs  with a predictable level of activity. Being stable, its character, severity, and occurrence do not change much over time. It usually starts with activity, and resolves with rest or taking your medicine as instructed by your doctor. The symptoms usually do not last long.  · Unstable angina changes or gets worse over time. It is different from whatever you are used to. It may feel different or worse, begin without cause, occur with exercise or exertion, wake you up from sleep, and last longer. It may not respond in the same way as it does when you take your usual medicines for an attack. This type of angina can be a warning sign of an impending heart attack.     A heart attack is usually the result of a blood clot that suddenly forms in a coronary artery that has been narrowed with plaque. When this occurs, blood flow may be cut off to a part of the heart muscle, causing the cells to die. This weakens the pumping action of the heart, which affects the delivery of blood to all the other organs in the body including the brain. This damage is not reversible. However, early treatment can limit the amount of damage.  The pain you feel with angina and a heart attack may have a similar quality. However, it is usually different in intensity and duration. Here are some typical descriptions of a heart attack:  · It is most often experienced as a squeezing, crushing, pressure-like sensation in the center of the chest.  · It is sometimes described as something heavy sitting on my chest.  · It may feel more like a bad case of indigestion.  · The pain may spread from the chest to the arm, shoulder, throat or jaw.  · Sometimes the pain is not felt in the chest at all, but only in the arm, shoulder, throat or jaw.  · There may also be nausea, vomiting, dizziness or light-headedness, sweating and trouble breathing.  · Palpitations, or your heart beating rapidly  · A new, irregular heart beat  · Unexplained weakness  You may not be  "able to tell the difference between "bad" angina and a heart attack at home. Seek help if your symptoms are different than usual. Do not be in denial or just try to "tough it out."  Call 911  This is the fastest and safest way to get to the emergency department. The paramedics can also start treatment on the way to the hospital, saving valuable time for your heart.  · If the angina gets worse, if it continues, or if it stops and returns, call 911 immediately. Do not delay. You may be having a heart attack.  · After you call 911, take a second tablet or spray unless instructed otherwise. When repeating doses, sit down if possible, because it can make you feel lightheaded or dizzy. Wait another 5 minutes. If the angina still does not go away, take a third tablet or spray. Do not take more than 3 tablets or sprays within 15 minutes. Stay on the phone with 911 for further instruction.  · Your healthcare provider may give you slightly different instructions than those above. If so, follow them carefully.  Do not wait until symptoms become severe to call 911.  Other reasons to call 911 include:  · Trouble breathing  · Feeling lightheaded, faint, or dizzy  · Rapid heart beat  · Slower than usual heart rate compared to your normal  · Angina with weakness, dizziness, fainting, heavy sweating, nausea, or vomiting  · Extreme drowsiness, confusion  · Weakness of an arm or leg or one side of the face  · Difficulty with speech or vision  When to seek medical care  Remember, the signs and symptoms of a heart attack are not always like they are on TV. Sometimes they are not so obvious. You may only feel weak, or just not right. If it is not clear or if you have any doubt, call for advice.  · Seek help if there is a change in the type of pain, if it feels different, or if your symptoms are mild.  · Do not drive yourself. Have someone else drive you. If no one can drive, call 911.  · Do not delay. Fast diagnosis and treatment can " "prevent or limit the amount of heart damage during a heart attack.  · Do not go to your doctor's office or a clinic as they may not be able to provide all the testing and treatment required for this condition.  · If your doctor has given you medicine to take when symptoms occur, take them but don't delay getting help trying to locate medicines.  What happens in the emergency department  The emergency department is connected to your local emergency medical system (EMS) through 911. That's why during a cardiac emergency, calling 911 is the fastest way to get help. The goal of the emergency department is to rapidly screen, evaluate, and treat people.  Once you are there, an electrocardiogram (ECG or heart tracing) will be done. Blood samples may be taken to look for the presence of heart enzymes that leak from damaged heart cells and show if a heart attack is occurring. You will often be evaluated by a heart specialist (cardiologist) who decides the best course of action. In the case of severe angina or early heart attack, and depending on the circumstances, powerful "clot busting" medicines can be used to dissolve blood clots in the coronary artery. In other cases, you may be taken to a cardiac catheterization lab. Here, a tiny balloon-tipped catheter is advanced through blood vessels to the heart. There the balloon is inflated pushing open the blood vessel restoring blood flow.  Risk factors for heart disease  Risk factors for heart disease are a combination of genetic and lifestyle. Many risk factors work by either directly or indirectly damaging the blood vessels of the heart, or by increasing the risk of forming blood or cholesterol clots, which then clog up and block the arteries.     Examples of physical lifestyle risk factors:  · Cigarette smoking  · High blood pressure  · High blood cholesterol  · Use of stimulant drugs such as cocaine, crack, and amphetamines  · Eating a high-fat, high-cholesterol " meal  · Diabetes   · Obesity which increases risk for diabetes and high blood pressure  · Lack of regular physical activity     Examples of emotional lifestyle factors:  · Chronic high stress levels release stress hormones. These raise blood pressure and cholesterol level and makes blood clot more easily.  · Held-in anger, hostile or cynical attitude  · Social and emotional isolation, lack of intimacy  · Loss of relationship  · Depression  Other factors that increase the risk of heart attack that you cannot control :  · Age. The older you get beyond 40, the greater is your risk of significant coronary artery disease.  · Gender. More men than women get heart disease; but once past menopause, women who are not taking estrogen replacement have the same risk as men for a heart attack.  · Family history. If your mother, father, brother or sister has coronary artery disease, your risk of having it is higher than a person your age without this family history.  What can you do to decrease your risk  To reduce your risk of heart disease:  · Get regular checkups with your doctor.  · Take your medicines for blood pressure, cholesterol or diabetes as directed.  · Watch your diet. Eat a heart healthy diet choosing fresh foods, less salt, cholesterol, and fat  · Stop smoking. Get help if needed.  · Get regular exercise.  · Manage stress.  · Carry a list of medicines and doses in your wallet.  Date Last Reviewed: 12/30/2015  © 3836-6660 [employer]. [address] [place] [address], [place] [address]. All rights reserved. This information is not intended as a substitute for professional medical care. Always follow your healthcare professional's instructions.        Low-Salt Diet  This diet removes foods that are high in salt. It also limits the amount of salt you use when cooking. It is most often used for people with high blood pressure, edema (fluid retention), and kidney, liver, or heart disease.  Table salt contains the  mineral sodium. Your body needs sodium to work normally. But too much sodium can make your health problems worse. Your healthcare provider is recommending a low-salt (also called low-sodium) diet for you. Your total daily allowance of salt is 1,500 to 2,300 milligrams (mg). It is less than 1 teaspoon of table salt. This means you can have only about 500 to 700 mg of sodium at each meal. People with certain health problems should limit salt intake to the lower end of the recommended range.    When you cook, dont add much salt. If you can cook without using salt, even better. Dont add salt to your food at the table.  When shopping, read food labels. Salt is often called sodium on the label. Choose foods that are salt-free, low salt, or very low salt. Note that foods with reduced salt may not lower your salt intake enough.    Beans, potatoes, and pasta  Ok: Dry beans, split peas, lentils, potatoes, rice, macaroni, pasta, spaghetti without added salt  Avoid: Potato chips, tortilla chips, and similar products  Breads and cereals  Ok: Low-sodium breads, rolls, cereals, and cakes; low-salt crackers, matzo crackers  Avoid: Salted crackers, pretzels, popcorn, Congolese toast, pancakes, muffins  Dairy  Ok: Milk, chocolate milk, hot chocolate mix, low-salt cheeses, and yogurt  Avoid: Processed cheese and cheese spreads; Roquefort, Camembert, and cottage cheese; buttermilk, instant breakfast drink  Desserts  Ok: Ice cream, frozen yogurt, juice bars, gelatin, cookies and pies, sugar, honey, jelly, hard candy  Avoid: Most pies, cakes and cookies prepared or processed with salt; instant pudding  Drinks  Ok: Tea, coffee, fizzy (carbonated) drinks, juices  Avoid: Flavored coffees, electrolyte replacement drinks, sports drinks  Meats  Ok: All fresh meat, fish, poultry, low-salt tuna, eggs, egg substitute  Avoid: Smoked, pickled, brine-cured, or salted meats and fish. This includes hobson, chipped beef, corned beef, hot dogs, deli  meats, ham, kosher meats, salt pork, sausage, canned tuna, salted codfish, smoked salmon, herring, sardines, or anchovies.  Seasonings and spices  Ok: Most seasonings are okay. Good substitutes for salt include: fresh herb blends, hot sauce, lemon, garlic, carney, vinegar, dry mustard, parsley, cilantro, horseradish, tomato paste, regular margarine, mayonnaise, unsalted butter, cream cheese, vegetable oil, cream, low-salt salad dressing and gravy.  Avoid: Regular ketchup, relishes, pickles, soy sauce, teriyaki sauce, Worcestershire sauce, BBQ sauce, tartar sauce, meat tenderizer, chili sauce, regular gravy, regular salad dressing, salted butter  Soups  Ok: Low-salt soups and broths made with allowed foods  Avoid: Bouillon cubes, soups with smoked or salted meats, regular soup and broth  Vegetables  Ok: Most vegetables are okay; also low-salt tomato and vegetable juices  Avoid: Sauerkraut and other brine-soaked vegetables; pickles and other pickled vegetables; tomato juice, olives  Date Last Reviewed: 8/1/2016  © 6332-2726 [employer]. [address] [place] [address]. All rights reserved. This information is not intended as a substitute for professional medical care. Always follow your healthcare professional's instructions.

## 2017-04-10 ENCOUNTER — HOSPITAL ENCOUNTER (EMERGENCY)
Facility: HOSPITAL | Age: 77
Discharge: HOME | End: 2017-04-10
Attending: EMERGENCY MEDICINE
Payer: MEDICARE

## 2017-04-10 VITALS
BODY MASS INDEX: 21.82 KG/M2 | OXYGEN SATURATION: 97 % | DIASTOLIC BLOOD PRESSURE: 74 MMHG | SYSTOLIC BLOOD PRESSURE: 100 MMHG | TEMPERATURE: 97 F | WEIGHT: 144 LBS | HEIGHT: 68 IN | RESPIRATION RATE: 18 BRPM | HEART RATE: 94 BPM

## 2017-04-10 DIAGNOSIS — R06.02 SOB (SHORTNESS OF BREATH): ICD-10-CM

## 2017-04-10 DIAGNOSIS — I50.9 CONGESTIVE HEART FAILURE, UNSPECIFIED CONGESTIVE HEART FAILURE CHRONICITY, UNSPECIFIED CONGESTIVE HEART FAILURE TYPE: Primary | ICD-10-CM

## 2017-04-10 LAB
ALBUMIN SERPL BCP-MCNC: 4.2 G/DL
ALP SERPL-CCNC: 171 IU/L
ALT SERPL W/O P-5'-P-CCNC: 64 IU/L
ANION GAP SERPL CALC-SCNC: 11 MMOL/L
AST SERPL-CCNC: 53 IU/L
BASOPHILS # BLD AUTO: 0.01 K/UL
BASOPHILS NFR BLD: 0.3 %
BILIRUB SERPL-MCNC: 0.7 MG/DL
BUN SERPL-MCNC: 49 MG/DL
CALCIUM SERPL-MCNC: 9.6 MG/DL
CHLORIDE SERPL-SCNC: 108 MMOL/L
CO2 SERPL-SCNC: 23 MMOL/L
CREAT SERPL-MCNC: 1.98 MG/DL
DIFFERENTIAL METHOD: ABNORMAL
EOSINOPHIL # BLD AUTO: 0.1 K/UL
EOSINOPHIL NFR BLD: 2.2 %
ERYTHROCYTE [DISTWIDTH] IN BLOOD BY AUTOMATED COUNT: 13.7 %
EST. GFR  (AFRICAN AMERICAN): 37.1 ML/MIN/1.73 M^2
EST. GFR  (NON AFRICAN AMERICAN): 32.1 ML/MIN/1.73 M^2
GLUCOSE SERPL-MCNC: 106 MG/DL
HCT VFR BLD AUTO: 36.4 %
HGB BLD-MCNC: 11.9 G/DL
LYMPHOCYTES # BLD AUTO: 1.6 K/UL
LYMPHOCYTES NFR BLD: 43.6 %
MCH RBC QN AUTO: 31.8 PG
MCHC RBC AUTO-ENTMCNC: 32.7 %
MCV RBC AUTO: 97 FL
MONOCYTES # BLD AUTO: 0.9 K/UL
MONOCYTES NFR BLD: 25.2 %
NEUTROPHILS # BLD AUTO: 1 K/UL
NEUTROPHILS NFR BLD: 28.4 %
NT-PROBNP: 2830 PG/ML
PLATELET # BLD AUTO: 110 K/UL
PMV BLD AUTO: 11.6 FL
POTASSIUM SERPL-SCNC: 4.3 MMOL/L
PROT SERPL-MCNC: 7.4 G/DL
RBC # BLD AUTO: 3.74 M/UL
SODIUM SERPL-SCNC: 142 MMOL/L
TROPONIN I SERPL DL<=0.01 NG/ML-MCNC: 0.04 NG/ML
WBC # BLD AUTO: 3.65 K/UL

## 2017-04-10 PROCEDURE — 83880 ASSAY OF NATRIURETIC PEPTIDE: CPT

## 2017-04-10 PROCEDURE — 80053 COMPREHEN METABOLIC PANEL: CPT

## 2017-04-10 PROCEDURE — 92960 CARDIOVERSION ELECTRIC EXT: CPT

## 2017-04-10 PROCEDURE — 84484 ASSAY OF TROPONIN QUANT: CPT

## 2017-04-10 PROCEDURE — 99284 EMERGENCY DEPT VISIT MOD MDM: CPT | Mod: 25

## 2017-04-10 PROCEDURE — 85025 COMPLETE CBC W/AUTO DIFF WBC: CPT

## 2017-04-10 PROCEDURE — 63600175 PHARM REV CODE 636 W HCPCS: Performed by: EMERGENCY MEDICINE

## 2017-04-10 PROCEDURE — 96374 THER/PROPH/DIAG INJ IV PUSH: CPT

## 2017-04-10 PROCEDURE — 93005 ELECTROCARDIOGRAM TRACING: CPT

## 2017-04-10 RX ORDER — FUROSEMIDE 10 MG/ML
40 INJECTION INTRAMUSCULAR; INTRAVENOUS
Status: COMPLETED | OUTPATIENT
Start: 2017-04-10 | End: 2017-04-10

## 2017-04-10 RX ADMIN — FUROSEMIDE 40 MG: 10 INJECTION, SOLUTION INTRAMUSCULAR; INTRAVENOUS at 02:04

## 2017-04-10 NOTE — ED PROVIDER NOTES
Encounter Date: 4/10/2017       History     Chief Complaint   Patient presents with    Shortness of Breath     hx of CHF     Review of patient's allergies indicates:   Allergen Reactions    Sulfa (sulfonamide antibiotics) Hives     Patient is a 76 y.o. male presenting with the following complaint: shortness of breath. The history is provided by the patient.   Shortness of Breath   This is a recurrent problem. The average episode lasts 4 hours. The problem occurs continuously.The current episode started 3 to 5 hours ago. The problem has been gradually improving. Associated symptoms include PND and orthopnea. Pertinent negatives include no fever, no headaches, no cough, no sputum production, no wheezing, no chest pain, no vomiting, no abdominal pain, no leg pain and no leg swelling. He has tried nothing for the symptoms. He has had prior hospitalizations. He has had prior ED visits. He has had prior ICU admissions. Associated medical issues include heart failure.     Past Medical History:   Diagnosis Date    Chronic combined systolic and diastolic congestive heart failure     Coronary artery disease     Diabetes mellitus, type II     Hyperlipidemia     Hypertension     Primary cardiomyopathy     Valvular regurgitation     m/s MR    Ventricular tachycardia      Past Surgical History:   Procedure Laterality Date    CARDIAC DEFIBRILLATOR PLACEMENT  10/2/2008; 1/5/2015    [employer]; converted to biventricular ICD VIVA QUAD XT CRT-D ANGU9O3: Serial No. [id number]    CARDIAC DEFIBRILLATOR PLACEMENT      CORONARY ANGIOPLASTY WITH STENT PLACEMENT  4/15/2008; 10/16/2014    LAD; RCA     Family History   Problem Relation Age of Onset    Diabetes Maternal Aunt      Social History   Substance Use Topics    Smoking status: Never Smoker    Smokeless tobacco: Never Used    Alcohol use No     Review of Systems   Constitutional: Negative for fever.   Respiratory: Positive for shortness of breath. Negative for cough,  sputum production and wheezing.    Cardiovascular: Positive for orthopnea and PND. Negative for chest pain and leg swelling.   Gastrointestinal: Negative for abdominal pain and vomiting.   Neurological: Negative for headaches.   All other systems reviewed and are negative.      Physical Exam   Initial Vitals   BP Pulse Resp Temp SpO2   04/10/17 0047 04/10/17 0047 04/10/17 0047 04/10/17 0117 04/10/17 0047   109/79 98 20 97.3 °F (36.3 °C) 93 %     Physical Exam    Nursing note and vitals reviewed.  Constitutional: He appears well-developed and well-nourished.   HENT:   Head: Normocephalic and atraumatic.   Eyes: EOM are normal.   Neck: Normal range of motion. Neck supple.   Cardiovascular: Normal rate, regular rhythm, normal heart sounds and intact distal pulses.   Pulmonary/Chest: He has rales.   Patient is showed some labored breathing but is only minimal.  He states he feels normal.   Abdominal: Soft.   Musculoskeletal: Normal range of motion.   Neurological: He is alert. He has normal strength.   Skin: Skin is warm and dry.   Psychiatric: He has a normal mood and affect. His behavior is normal. Judgment and thought content normal.         ED Course   Procedures  Labs Reviewed   COMPREHENSIVE METABOLIC PANEL - Abnormal; Notable for the following:        Result Value    BUN, Bld 49 (*)     Creatinine 1.98 (*)     Alkaline Phosphatase 171 (*)     AST 53 (*)     ALT 64 (*)     eGFR if  37.1 (*)     eGFR if non  32.1 (*)     All other components within normal limits   CBC W/ AUTO DIFFERENTIAL - Abnormal; Notable for the following:     WBC 3.65 (*)     RBC 3.74 (*)     Hemoglobin 11.9 (*)     Hematocrit 36.4 (*)     MCH 31.8 (*)     Platelets 110 (*)     Gran # 1.0 (*)     Gran% 28.4 (*)     Mono% 25.2 (*)     All other components within normal limits   TROPONIN I - Abnormal; Notable for the following:     Troponin I 0.037 (*)     All other components within normal limits   NT-PRO  NATRIURETIC PEPTIDE - Abnormal; Notable for the following:     NT-proBNP 2830 (*)     All other components within normal limits          X-Rays:   Independently Interpreted Readings:   Chest X-Ray: Normal heart size.  No infiltrates.  No acute abnormalities.                       ED Course     Clinical Impression:   The primary encounter diagnosis was Congestive heart failure, unspecified congestive heart failure chronicity, unspecified congestive heart failure type. A diagnosis of SOB (shortness of breath) was also pertinent to this visit.    Disposition:   Disposition: Discharged  Condition: Stable       Lyn Mead MD  04/10/17 0325

## 2017-04-10 NOTE — DISCHARGE INSTRUCTIONS
Left- or Right- Side Congestive Heart Failure    The heart is a large muscle. It is a pump that circulates blood throughout the body. Blood carries oxygen to all of the organs, including the brain, muscles, and skin. After your body takes the oxygen out of the blood, the blood returns to the heart. The right side of the heart collects the blood from the body and pumps it to the lungs. In the lungs, it gets fresh oxygen and gives up carbon dioxide. The oxygen-rich blood from the lungs then returns to the left side of the heart, where it is pumped back out to the rest of your body, starting the process all over.  Congestive heart failure (CHF) occurs when the heart muscle is weakened. This affects the pumping action of the heart. Heart failure can affect the right side of the heart or the left side. But heart failure may affect not only the right side of the heart or only the left side. Although it may have started on one side, it can and often eventually does affect both sides.  Right-side heart failure  When the right side of the heart is weakened, it cant handle the blood it is getting from the rest of the body. This blood returns to the heart through veins. When too much pressure builds up in the veins, fluid leaks out into the tissues. Gravity then causes that fluid to move to those parts of the body that are the lowest. So one of the first symptoms of right-side CHF can include swelling in the feet and ankles. If the condition gets worse, the swelling can even go up past the knees. Sometimes it gets so severe, the liver can get congested as well.  Left-side heart failure  When the left side of the heart is weakened, it cant handle the blood it gets from the lungs. Pressure then builds up in the veins of the lungs, causing fluid to leak into the lung tissues. This may cause CHF and pulmonary edema. This causes you to feel short of breath, weak, or dizzy. These symptoms are often worse with exertion, such as  when climbing stairs or walking up hills. Lying with your head flat is uncomfortable and can make your breathing worse. This may make sleeping difficult. You may need to use extra pillows to elevate your upper body to sleep well. The same is true when just resting during the daytime.  There are many causes of heart failure including:  · Coronary artery disease  · Past heart attack (also known as acute myocardial infarction, or AMI)  · High blood pressure  · Damaged heart valve  · Diabetes  · Obesity  · Cigarette smoking  · Alcohol abuse  Heart failure is a chronic condition. There is no cure. The purpose of medical treatment is to improve the pumping action of the heart. The main way to do this is to remove excess water from the body. A number of medicines can help reach this goal, improve symptoms, and prevent the heart from becoming weaker. Sometimes, heart failure can become so severe that a device is placed in the heart to help with pumping. Another major goal is to better treat the causes of heart failure, such as diabetes and high blood pressure, by making changes in your lifestyle and maximizing medical control when needed.  Home care  Follow these guidelines when caring for yourself at home:  · Check your weight every day. This is very important because a sudden increase in weight gain could mean worsening heart failure. Keep these things in mind:  ¨ Use the same scale every day.  ¨ Weigh yourself at the same time every day.  ¨ Make sure the scale is on a hard floor surface, not on a rug or carpet.  ¨ Keep a record of your weight every day so your healthcare provider can see it. If you are not given a log sheet for this, keep a separate journal for this purpose.   · Cut back on the amount of salt (sodium) you eat. Follow your healthcare provider's recommendation on how much salt or sodium you should have each day.  ¨ Avoid high-salt foods. These include olives, pickles, smoked meats, salted potato chips, and  most prepared foods.  ¨ Don't add salt to your food at the table. Use only small amounts of salt when cooking.  ¨ Read the labels carefully on food packages to learn how much salt or sodium is in each serving in the package. Remember, a can or package of food may contain more than 1 serving. So if you eat all the food in the package, you may be getting more salt than you think.  · Follow your healthcare provider's recommendations about how much fluid you should have. Be aware that some foods, such as soup, pudding, and juicy fruits like oranges or melons, contain liquid. You'll need to count the liquid in those foods as part of your daily fluid intake. Your provider can help you with this.  · Stop smoking.  · Cut back on how much alcohol you drink.  · Lose weight if you are overweight. The excess weight adds a lot of stress on the workload of the heart.  · Stay active. Talk with your provider about an exercise program that is safe for your heart.  · Keep your feet elevated to reduce swelling. Ask your provider about support hose as a preventive treatment for daytime leg swelling.  Besides taking your medicine as instructed, an important part of treatment is lifestyle changes. These include diet, physical activity, stopping smoking, and weight control.  Improve your diet by including more fresh foods, cutting back on how much sugar and saturated fat you eat, and eating fewer processed foods and less salt.  Follow-up care  Follow up with your healthcare provider, or as advised.  Make sure to keep any appointments that were made for you. These can help better control your congestive heart failure. You will need to follow up with your provider on a routine basis to make sure your heart failure is well managed.  If an X-ray, ECG, or other tests were done, you will be told of any new findings that may affect your care.  Call 911  Call 911 if you:  · Become severely short of breath  · Feel lightheaded, or feel like you  might pass out or faint  · Have chest pain or discomfort that is different than usual, the medicines your doctor told you to use for this don't help, or the pain lasts longer than 10 to 15 minutes  · You suddenly develop a rapid heart rate  When to seek medical advice  The following may be signs that your heart failure is getting worse. Call your healthcare provider right away if any of these happen:  · Sudden weight gain. This means 3 or more pounds in one day, or 5 or more pounds in 1 week.  · Trouble breathing not related to being active  · New or increased swelling of your legs or ankles  · Swelling or pain in your abdomen  · Breathing trouble at night. This means waking up short of breath or needing more pillows to breathe.  · Frequent coughing that doesnt go away  · Feeling much more tired than usual  Date Last Reviewed: 1/4/2016  © 8170-0445 [employer]. [address] [place] [address]. All rights reserved. This information is not intended as a substitute for professional medical care. Always follow your healthcare professional's instructions.          Coping with Heart Failure  Its normal to feel sad or down at times when youre living with heart failure. Some medicines can also affect your mood. Following your treatment plan may seem difficult at times. If you feel overwhelmed, just focus on one day at a time. Dont be afraid to ask others for help when you need it.    Ways to feel better  Try not to withdraw from family and friends, even if you are finding it hard to talk to them. They can still be a good source of support. To feel better, you can also:  · Spend time doing things you enjoy. This may include participating in a favorite hobby, meditating, praying, or spending time with people you care about. Find activities that make you happy and make those a priority.  · Share what you learn about heart failure with the people in your life. Invite family members along when you  visit your healthcare provider. This will help you feel supported as well as help you discuss the care plan you've agreed upon with your doctor.  · Think about joining a support group for people with heart failure. It may be easier to talk to people who know firsthand what youre going through. They can offer advice and share stories. You may want to ask loved ones to join you for a meeting.  Asking for help  Having heart failure doesnt mean that you have to feel bad all the time. Consider talking to your healthcare provider or a therapist if:  · You feel worthless or helpless, or are thinking about suicide. These are warning signs of depression. Treatment can help you feel better. When depression is under control, your overall health may also improve.  · You feel anxious about what will happen to your loved ones if your health gets worse. Taking care of legal arrangements, such as a living will and durable power of , can help you feel more secure about the future.  · Social support helps alleviate stress and helps your stick with your healthy lifestyle changes. Without social support, you may end up back in the hospital.  Date Last Reviewed: 3/20/2016  © 0838-5766 [employer]. [address] [place] [address], [place] [address]. All rights reserved. This information is not intended as a substitute for professional medical care. Always follow your healthcare professional's instructions.

## 2017-04-10 NOTE — ED AVS SNAPSHOT
[facility]  [address] [place] [id number]               Trever Claros   4/10/2017 12:45 AM   ED    Description:  Male : 1940   Department:  [facility]           Your Care was Coordinated By:     Provider Role From To    Lyn Mead MD Attending Provider 04/10/17 0048 --      Reason for Visit     Shortness of Breath           Diagnoses this Visit        Comments    Congestive heart failure, unspecified congestive heart failure chronicity, unspecified congestive heart failure type    -  Primary     SOB (shortness of breath)           ED Disposition     ED Disposition Condition Comment    Discharge             To Do List           Follow-up Information     Follow up with Wiliam Mtz MD In 2 day(s).    Specialty:  Family Medicine    Contact information:    [address] [facility] [address]  [phone]        [facility]     [facility] Care [facility] -  Assistance  Unless otherwise directed by your provider, please contact [facility], our nurse care line that is available for  assistance.     Registered nurses in the [facility] provide: appointment scheduling, clinical advisement, health education, and other advisory services.  Call: [phone] (toll free)               Medications           Message regarding Medications     Verify the changes and/or additions to your medication regime listed below are the same as discussed with your clinician today.  If any of these changes or additions are incorrect, please notify your healthcare provider.        These medications were administered today        Dose Freq    furosemide injection 40 mg 40 mg ED 1 Time    Sig: Inject 4 mLs (40 mg total) into the vein ED 1 Time.    Class: Normal    Route: Intravenous           Verify that the below list of medications is an accurate representation of the medications you are currently taking.  If none reported, the list may be  blank. If incorrect, please contact your healthcare provider. Carry this list with you in case of emergency.           Current Medications     allopurinol (ZYLOPRIM) 100 MG tablet Take 100 mg by mouth once daily.    aspirin (ECOTRIN) 81 MG EC tablet Take 81 mg by mouth once daily.    atorvastatin (LIPITOR) 20 MG tablet Take 1 tablet (20 mg total) by mouth every evening.    benzonatate (TESSALON) 200 MG capsule Take 1 capsule (200 mg total) by mouth 4 (four) times daily as needed for Cough.    carvedilol (COREG) 12.5 MG tablet Take 1 tablet (12.5 mg total) by mouth 2 (two) times daily.    clopidogrel (PLAVIX) 75 mg tablet Take 1 tablet (75 mg total) by mouth once daily.    escitalopram oxalate (LEXAPRO) 10 MG tablet Take 1 tablet (10 mg total) by mouth once daily. For depression    finasteride (PROSCAR) 5 mg tablet Take 5 mg by mouth once daily.    fosinopril (MONOPRIL) 20 MG tablet Take 1 tablet (20 mg total) by mouth once daily.    furosemide (LASIX) 20 MG tablet Take 1 tablet (20 mg) every Sunday, Monday, Wednesday, Friday    furosemide injection 40 mg Inject 4 mLs (40 mg total) into the vein ED 1 Time.    guaifenesin-codeine 100-10 mg/5 ml (CHERATUSSIN AC)  mg/5 mL syrup Take 10 mLs by mouth every 4 (four) hours as needed for Cough.    isosorbide mononitrate (IMDUR) 60 MG 24 hr tablet Take 1 tablet (60 mg total) by mouth once daily.    nitroGLYCERIN (NITROSTAT) 0.4 MG SL tablet Place 1 tablet (0.4 mg total) under the tongue every 5 (five) minutes as needed for Chest pain (maximum 3 doses in 24 hours).    polyethylene glycol (GLYCOLAX) 17 gram/dose powder TAKE 17 GRAMS (1 CAPFUL) BY MOUTH NIGHTLY.    ranitidine (ZANTAC) 300 MG capsule Take 1 capsule (300 mg total) by mouth every evening.    saw palmetto 500 MG capsule Take 450 mg by mouth once daily.     tamsulosin (FLOMAX) 0.4 mg Cp24 Take 1 capsule by mouth 2 (two) times daily.     TRADJENTA 5 mg Tab tablet TAKE 1 TABLET BY MOUTH EACH DAY    URELLE 81-0.12  "mg Tab Take 1 tablet by mouth as needed.            Clinical Reference Information           Your Vitals Were     BP Pulse Temp Resp Height Weight    100/74 (BP Location: Right arm, Patient Position: Lying, BP Method: Automatic) 94 97.3 °F (36.3 °C) (Oral) 18 5' 8" (1.727 m) 65.3 kg (144 lb)    SpO2 BMI             97% 21.9 kg/m2         Allergies as of 4/10/2017        Reactions    Sulfa (Sulfonamide Antibiotics) Hives      Immunizations Administered on Date of Encounter - 4/10/2017     None      ED Micro, Lab, POCT     Start Ordered       Status Ordering Provider    04/10/17 0118 04/10/17 0117  Comprehensive metabolic panel  STAT      Final result     04/10/17 0118 04/10/17 0117  CBC auto differential  STAT      Final result     04/10/17 0118 04/10/17 0117  Troponin I  STAT      Final result     04/10/17 0118 04/10/17 0117    STAT,   Status:  Canceled      Canceled     04/10/17 0117 04/10/17 0117  NT-Pro Natriuretic Peptide  Once      Final result       ED Imaging Orders     Start Ordered       Status Ordering Provider    04/10/17 0059 04/10/17 0059  X-Ray Chest AP Portable  1 time imaging      In process         Discharge Instructions           Left- or Right- Side Congestive Heart Failure    The heart is a large muscle. It is a pump that circulates blood throughout the body. Blood carries oxygen to all of the organs, including the brain, muscles, and skin. After your body takes the oxygen out of the blood, the blood returns to the heart. The right side of the heart collects the blood from the body and pumps it to the lungs. In the lungs, it gets fresh oxygen and gives up carbon dioxide. The oxygen-rich blood from the lungs then returns to the left side of the heart, where it is pumped back out to the rest of your body, starting the process all over.  Congestive heart failure (CHF) occurs when the heart muscle is weakened. This affects the pumping action of the heart. Heart failure can affect the right side of " the heart or the left side. But heart failure may affect not only the right side of the heart or only the left side. Although it may have started on one side, it can and often eventually does affect both sides.  Right-side heart failure  When the right side of the heart is weakened, it cant handle the blood it is getting from the rest of the body. This blood returns to the heart through veins. When too much pressure builds up in the veins, fluid leaks out into the tissues. Gravity then causes that fluid to move to those parts of the body that are the lowest. So one of the first symptoms of right-side CHF can include swelling in the feet and ankles. If the condition gets worse, the swelling can even go up past the knees. Sometimes it gets so severe, the liver can get congested as well.  Left-side heart failure  When the left side of the heart is weakened, it cant handle the blood it gets from the lungs. Pressure then builds up in the veins of the lungs, causing fluid to leak into the lung tissues. This may cause CHF and pulmonary edema. This causes you to feel short of breath, weak, or dizzy. These symptoms are often worse with exertion, such as when climbing stairs or walking up hills. Lying with your head flat is uncomfortable and can make your breathing worse. This may make sleeping difficult. You may need to use extra pillows to elevate your upper body to sleep well. The same is true when just resting during the daytime.  There are many causes of heart failure including:  · Coronary artery disease  · Past heart attack (also known as acute myocardial infarction, or AMI)  · High blood pressure  · Damaged heart valve  · Diabetes  · Obesity  · Cigarette smoking  · Alcohol abuse  Heart failure is a chronic condition. There is no cure. The purpose of medical treatment is to improve the pumping action of the heart. The main way to do this is to remove excess water from the body. A number of medicines can help reach  this goal, improve symptoms, and prevent the heart from becoming weaker. Sometimes, heart failure can become so severe that a device is placed in the heart to help with pumping. Another major goal is to better treat the causes of heart failure, such as diabetes and high blood pressure, by making changes in your lifestyle and maximizing medical control when needed.  Home care  Follow these guidelines when caring for yourself at home:  · Check your weight every day. This is very important because a sudden increase in weight gain could mean worsening heart failure. Keep these things in mind:  ¨ Use the same scale every day.  ¨ Weigh yourself at the same time every day.  ¨ Make sure the scale is on a hard floor surface, not on a rug or carpet.  ¨ Keep a record of your weight every day so your healthcare provider can see it. If you are not given a log sheet for this, keep a separate journal for this purpose.   · Cut back on the amount of salt (sodium) you eat. Follow your healthcare provider's recommendation on how much salt or sodium you should have each day.  ¨ Avoid high-salt foods. These include olives, pickles, smoked meats, salted potato chips, and most prepared foods.  ¨ Don't add salt to your food at the table. Use only small amounts of salt when cooking.  ¨ Read the labels carefully on food packages to learn how much salt or sodium is in each serving in the package. Remember, a can or package of food may contain more than 1 serving. So if you eat all the food in the package, you may be getting more salt than you think.  · Follow your healthcare provider's recommendations about how much fluid you should have. Be aware that some foods, such as soup, pudding, and juicy fruits like oranges or melons, contain liquid. You'll need to count the liquid in those foods as part of your daily fluid intake. Your provider can help you with this.  · Stop smoking.  · Cut back on how much alcohol you drink.  · Lose weight if you  are overweight. The excess weight adds a lot of stress on the workload of the heart.  · Stay active. Talk with your provider about an exercise program that is safe for your heart.  · Keep your feet elevated to reduce swelling. Ask your provider about support hose as a preventive treatment for daytime leg swelling.  Besides taking your medicine as instructed, an important part of treatment is lifestyle changes. These include diet, physical activity, stopping smoking, and weight control.  Improve your diet by including more fresh foods, cutting back on how much sugar and saturated fat you eat, and eating fewer processed foods and less salt.  Follow-up care  Follow up with your healthcare provider, or as advised.  Make sure to keep any appointments that were made for you. These can help better control your congestive heart failure. You will need to follow up with your provider on a routine basis to make sure your heart failure is well managed.  If an X-ray, ECG, or other tests were done, you will be told of any new findings that may affect your care.  Call 911  Call 911 if you:  · Become severely short of breath  · Feel lightheaded, or feel like you might pass out or faint  · Have chest pain or discomfort that is different than usual, the medicines your doctor told you to use for this don't help, or the pain lasts longer than 10 to 15 minutes  · You suddenly develop a rapid heart rate  When to seek medical advice  The following may be signs that your heart failure is getting worse. Call your healthcare provider right away if any of these happen:  · Sudden weight gain. This means 3 or more pounds in one day, or 5 or more pounds in 1 week.  · Trouble breathing not related to being active  · New or increased swelling of your legs or ankles  · Swelling or pain in your abdomen  · Breathing trouble at night. This means waking up short of breath or needing more pillows to breathe.  · Frequent coughing that doesnt go  away  · Feeling much more tired than usual  Date Last Reviewed: 1/4/2016  © 9740-6053 [employer]. [address] [place] [address], [place] [address]. All rights reserved. This information is not intended as a substitute for professional medical care. Always follow your healthcare professional's instructions.          Coping with Heart Failure  Its normal to feel sad or down at times when youre living with heart failure. Some medicines can also affect your mood. Following your treatment plan may seem difficult at times. If you feel overwhelmed, just focus on one day at a time. Dont be afraid to ask others for help when you need it.    Ways to feel better  Try not to withdraw from family and friends, even if you are finding it hard to talk to them. They can still be a good source of support. To feel better, you can also:  · Spend time doing things you enjoy. This may include participating in a favorite hobby, meditating, praying, or spending time with people you care about. Find activities that make you happy and make those a priority.  · Share what you learn about heart failure with the people in your life. Invite family members along when you visit your healthcare provider. This will help you feel supported as well as help you discuss the care plan you've agreed upon with your doctor.  · Think about joining a support group for people with heart failure. It may be easier to talk to people who know firsthand what youre going through. They can offer advice and share stories. You may want to ask loved ones to join you for a meeting.  Asking for help  Having heart failure doesnt mean that you have to feel bad all the time. Consider talking to your healthcare provider or a therapist if:  · You feel worthless or helpless, or are thinking about suicide. These are warning signs of depression. Treatment can help you feel better. When depression is under control, your overall health may also improve.  · You feel  anxious about what will happen to your loved ones if your health gets worse. Taking care of legal arrangements, such as a living will and durable power of , can help you feel more secure about the future.  · Social support helps alleviate stress and helps your stick with your healthy lifestyle changes. Without social support, you may end up back in the hospital.  Date Last Reviewed: 3/20/2016  © 6207-1605 [employer]. [address] [place] [address], [place] [address]. All rights reserved. This information is not intended as a substitute for professional medical care. Always follow your healthcare professional's instructions.          Your Scheduled Appointments     Jul 10, 2017  8:20 AM CDT   Debrillator Tune Up with PACEMAKER, ICD   [facility] ([facility] )    [address]  [facility] [address]   [phone]              [facility] Sign-Up     Activating your [facility] account is as easy as 1-2-3!     1) Visit [email or web address], select Sign Up Now, enter this activation code and your date of birth, then select Next.  [email or web address]  Expires: 5/25/2017  3:25 AM      2) Create a username and password to use when you visit [facility] in the future and select a security question in case you lose your password and select Next.    3) Enter your e-mail address and click Sign Up!    Additional Information  If you have questions, please e-mail [email or web address] or call [phone] to talk to our [facility] staff. Remember, [facility] is NOT to be used for urgent needs. For medical emergencies, dial 911.          [facility] complies with applicable Federal civil rights laws and does not discriminate on the basis of race, color, national origin, age, disability, or sex.        Language Assistance Services     ATTENTION: Language assistance services are available, free of charge. Please call [phone].      ATENCIÓN: quin K[employer]ler  disposición servicios gratuitos de asistencia lingüística. Lljarad al [phone].     RADHA Ý: N?u b?n nói Ti?ng Vi?t, có các d?ch v? h? tr? ngôn ng? mi?n phí dành cho b?n. G?i s? [phone].

## 2017-04-10 NOTE — ED TRIAGE NOTES
"Pt here with complaints of shortness of breath x 30 minutes to hour. Pt reports hx of CHF. States "I need Lasix". Pt awake and alert. Respirations non labored. Family with patient.  "

## 2017-04-13 ENCOUNTER — HOSPITAL ENCOUNTER (EMERGENCY)
Facility: HOSPITAL | Age: 77
Discharge: HOME | End: 2017-04-13
Attending: FAMILY MEDICINE
Payer: MEDICARE

## 2017-04-13 VITALS
OXYGEN SATURATION: 98 % | HEIGHT: 68 IN | WEIGHT: 144 LBS | SYSTOLIC BLOOD PRESSURE: 100 MMHG | HEART RATE: 80 BPM | RESPIRATION RATE: 18 BRPM | DIASTOLIC BLOOD PRESSURE: 60 MMHG | BODY MASS INDEX: 21.82 KG/M2

## 2017-04-13 DIAGNOSIS — R06.02 SOB (SHORTNESS OF BREATH): ICD-10-CM

## 2017-04-13 DIAGNOSIS — R06.02 SHORTNESS OF BREATH: ICD-10-CM

## 2017-04-13 DIAGNOSIS — I50.22 CHRONIC SYSTOLIC CONGESTIVE HEART FAILURE: Primary | ICD-10-CM

## 2017-04-13 LAB
ALBUMIN SERPL BCP-MCNC: 3.9 G/DL
ALP SERPL-CCNC: 166 IU/L
ALT SERPL W/O P-5'-P-CCNC: 59 IU/L
ANION GAP SERPL CALC-SCNC: 12 MMOL/L
AST SERPL-CCNC: 34 IU/L
BASOPHILS # BLD AUTO: 0.01 K/UL
BASOPHILS NFR BLD: 0.3 %
BILIRUB SERPL-MCNC: 0.8 MG/DL
BUN SERPL-MCNC: 55 MG/DL
CALCIUM SERPL-MCNC: 9.2 MG/DL
CHLORIDE SERPL-SCNC: 107 MMOL/L
CO2 SERPL-SCNC: 21 MMOL/L
CREAT SERPL-MCNC: 1.99 MG/DL
DIFFERENTIAL METHOD: ABNORMAL
EOSINOPHIL # BLD AUTO: 0.1 K/UL
EOSINOPHIL NFR BLD: 1.9 %
ERYTHROCYTE [DISTWIDTH] IN BLOOD BY AUTOMATED COUNT: 13.7 %
EST. GFR  (AFRICAN AMERICAN): 36.9 ML/MIN/1.73 M^2
EST. GFR  (NON AFRICAN AMERICAN): 31.9 ML/MIN/1.73 M^2
GLUCOSE SERPL-MCNC: 126 MG/DL
HCT VFR BLD AUTO: 36.2 %
HGB BLD-MCNC: 11.8 G/DL
LYMPHOCYTES # BLD AUTO: 1.2 K/UL
LYMPHOCYTES NFR BLD: 33.7 %
MCH RBC QN AUTO: 31.7 PG
MCHC RBC AUTO-ENTMCNC: 32.6 %
MCV RBC AUTO: 97 FL
MONOCYTES # BLD AUTO: 1 K/UL
MONOCYTES NFR BLD: 26.5 %
NEUTROPHILS # BLD AUTO: 1.4 K/UL
NEUTROPHILS NFR BLD: 37.6 %
NT-PROBNP: 2210 PG/ML
PLATELET # BLD AUTO: 113 K/UL
PMV BLD AUTO: 11.6 FL
POTASSIUM SERPL-SCNC: 4.2 MMOL/L
PROT SERPL-MCNC: 7.1 G/DL
RBC # BLD AUTO: 3.72 M/UL
SODIUM SERPL-SCNC: 140 MMOL/L
TROPONIN I SERPL DL<=0.01 NG/ML-MCNC: 0.04 NG/ML
WBC # BLD AUTO: 3.59 K/UL

## 2017-04-13 PROCEDURE — 84484 ASSAY OF TROPONIN QUANT: CPT

## 2017-04-13 PROCEDURE — 80053 COMPREHEN METABOLIC PANEL: CPT

## 2017-04-13 PROCEDURE — 83880 ASSAY OF NATRIURETIC PEPTIDE: CPT

## 2017-04-13 PROCEDURE — 93005 ELECTROCARDIOGRAM TRACING: CPT

## 2017-04-13 PROCEDURE — 85025 COMPLETE CBC W/AUTO DIFF WBC: CPT

## 2017-04-13 PROCEDURE — 99284 EMERGENCY DEPT VISIT MOD MDM: CPT | Mod: 25

## 2017-04-13 NOTE — ED AVS SNAPSHOT
[facility]  [address] [place] [id number]               Trever Claros   2017  2:25 AM   ED    Description:  Male : 1940   Department:  [facility]           Your Care was Coordinated By:     Provider Role From To    Juan Carlos Rangel MD Attending Provider 17 0231 --      Reason for Visit     Shortness of Breath           Diagnoses this Visit        Comments    Chronic systolic congestive heart failure    -  Primary     SOB (shortness of breath)         Shortness of breath           ED Disposition     ED Disposition Condition Comment    Discharge             To Do List           Follow-up Information     Follow up with Wiliam Mtz MD In 1 week(s).    Specialty:  Family Medicine    Contact information:    [address] [facility] [place] [address]  [phone]        [facility]     [facility] -  Assistance  Unless otherwise directed by your provider, please contact [facility], our nurse care line that is available for  assistance.     Registered nurses in the [facility] provide: appointment scheduling, clinical advisement, health education, and other advisory services.  Call: [phone] (toll free)               Medications           Message regarding Medications     Verify the changes and/or additions to your medication regime listed below are the same as discussed with your clinician today.  If any of these changes or additions are incorrect, please notify your healthcare provider.             Verify that the below list of medications is an accurate representation of the medications you are currently taking.  If none reported, the list may be blank. If incorrect, please contact your healthcare provider. Carry this list with you in case of emergency.           Current Medications     allopurinol (ZYLOPRIM) 100 MG tablet Take 100 mg by mouth once daily.    aspirin (ECOTRIN) 81 MG EC tablet  "Take 81 mg by mouth once daily.    atorvastatin (LIPITOR) 20 MG tablet Take 1 tablet (20 mg total) by mouth every evening.    benzonatate (TESSALON) 200 MG capsule Take 1 capsule (200 mg total) by mouth 4 (four) times daily as needed for Cough.    carvedilol (COREG) 12.5 MG tablet Take 1 tablet (12.5 mg total) by mouth 2 (two) times daily.    clopidogrel (PLAVIX) 75 mg tablet Take 1 tablet (75 mg total) by mouth once daily.    escitalopram oxalate (LEXAPRO) 10 MG tablet Take 1 tablet (10 mg total) by mouth once daily. For depression    finasteride (PROSCAR) 5 mg tablet Take 5 mg by mouth once daily.    fosinopril (MONOPRIL) 20 MG tablet Take 1 tablet (20 mg total) by mouth once daily.    furosemide (LASIX) 20 MG tablet Take 1 tablet (20 mg) every Sunday, Monday, Wednesday, Friday    guaifenesin-codeine 100-10 mg/5 ml (CHERATUSSIN AC)  mg/5 mL syrup Take 10 mLs by mouth every 4 (four) hours as needed for Cough.    isosorbide mononitrate (IMDUR) 60 MG 24 hr tablet Take 1 tablet (60 mg total) by mouth once daily.    nitroGLYCERIN (NITROSTAT) 0.4 MG SL tablet Place 1 tablet (0.4 mg total) under the tongue every 5 (five) minutes as needed for Chest pain (maximum 3 doses in 24 hours).    polyethylene glycol (GLYCOLAX) 17 gram/dose powder TAKE 17 GRAMS (1 CAPFUL) BY MOUTH NIGHTLY.    ranitidine (ZANTAC) 300 MG capsule Take 1 capsule (300 mg total) by mouth every evening.    saw palmetto 500 MG capsule Take 450 mg by mouth once daily.     tamsulosin (FLOMAX) 0.4 mg Cp24 Take 1 capsule by mouth 2 (two) times daily.     TRADJENTA 5 mg Tab tablet TAKE 1 TABLET BY MOUTH EACH DAY    URELLE 81-0.12 mg Tab Take 1 tablet by mouth as needed.            Clinical Reference Information           Your Vitals Were     BP Height Weight SpO2 BMI    116/78 (BP Location: Right arm) 5' 8" (1.727 m) 65.3 kg (144 lb) 97% 21.9 kg/m2      Allergies as of 4/13/2017        Reactions    Sulfa (Sulfonamide Antibiotics) Hives      Immunizations " Administered on Date of Encounter - 4/13/2017     None      ED Micro, Lab, POCT     Start Ordered       Status Ordering Provider    04/13/17 0251 04/13/17 0250    STAT,   Status:  Canceled      Canceled     04/13/17 0251 04/13/17 0250  Comprehensive metabolic panel  STAT      Final result     04/13/17 0251 04/13/17 0250  CBC auto differential  STAT      Final result     04/13/17 0251 04/13/17 0250  Troponin I  STAT      Final result     04/13/17 0250 04/13/17 0250  NT-Pro Natriuretic Peptide  Once      Final result       ED Imaging Orders     Start Ordered       Status Ordering Provider    04/13/17 0251 04/13/17 0250  X-Ray Chest 1 View  1 time imaging      In process         Discharge Instructions           Left- or Right- Side Congestive Heart Failure    The heart is a large muscle. It is a pump that circulates blood throughout the body. Blood carries oxygen to all of the organs, including the brain, muscles, and skin. After your body takes the oxygen out of the blood, the blood returns to the heart. The right side of the heart collects the blood from the body and pumps it to the lungs. In the lungs, it gets fresh oxygen and gives up carbon dioxide. The oxygen-rich blood from the lungs then returns to the left side of the heart, where it is pumped back out to the rest of your body, starting the process all over.  Congestive heart failure (CHF) occurs when the heart muscle is weakened. This affects the pumping action of the heart. Heart failure can affect the right side of the heart or the left side. But heart failure may affect not only the right side of the heart or only the left side. Although it may have started on one side, it can and often eventually does affect both sides.  Right-side heart failure  When the right side of the heart is weakened, it cant handle the blood it is getting from the rest of the body. This blood returns to the heart through veins. When too much pressure builds up in the veins, fluid  leaks out into the tissues. Gravity then causes that fluid to move to those parts of the body that are the lowest. So one of the first symptoms of right-side CHF can include swelling in the feet and ankles. If the condition gets worse, the swelling can even go up past the knees. Sometimes it gets so severe, the liver can get congested as well.  Left-side heart failure  When the left side of the heart is weakened, it cant handle the blood it gets from the lungs. Pressure then builds up in the veins of the lungs, causing fluid to leak into the lung tissues. This may cause CHF and pulmonary edema. This causes you to feel short of breath, weak, or dizzy. These symptoms are often worse with exertion, such as when climbing stairs or walking up hills. Lying with your head flat is uncomfortable and can make your breathing worse. This may make sleeping difficult. You may need to use extra pillows to elevate your upper body to sleep well. The same is true when just resting during the daytime.  There are many causes of heart failure including:  · Coronary artery disease  · Past heart attack (also known as acute myocardial infarction, or AMI)  · High blood pressure  · Damaged heart valve  · Diabetes  · Obesity  · Cigarette smoking  · Alcohol abuse  Heart failure is a chronic condition. There is no cure. The purpose of medical treatment is to improve the pumping action of the heart. The main way to do this is to remove excess water from the body. A number of medicines can help reach this goal, improve symptoms, and prevent the heart from becoming weaker. Sometimes, heart failure can become so severe that a device is placed in the heart to help with pumping. Another major goal is to better treat the causes of heart failure, such as diabetes and high blood pressure, by making changes in your lifestyle and maximizing medical control when needed.  Home care  Follow these guidelines when caring for yourself at home:  · Check your  weight every day. This is very important because a sudden increase in weight gain could mean worsening heart failure. Keep these things in mind:  ¨ Use the same scale every day.  ¨ Weigh yourself at the same time every day.  ¨ Make sure the scale is on a hard floor surface, not on a rug or carpet.  ¨ Keep a record of your weight every day so your healthcare provider can see it. If you are not given a log sheet for this, keep a separate journal for this purpose.   · Cut back on the amount of salt (sodium) you eat. Follow your healthcare provider's recommendation on how much salt or sodium you should have each day.  ¨ Avoid high-salt foods. These include olives, pickles, smoked meats, salted potato chips, and most prepared foods.  ¨ Don't add salt to your food at the table. Use only small amounts of salt when cooking.  ¨ Read the labels carefully on food packages to learn how much salt or sodium is in each serving in the package. Remember, a can or package of food may contain more than 1 serving. So if you eat all the food in the package, you may be getting more salt than you think.  · Follow your healthcare provider's recommendations about how much fluid you should have. Be aware that some foods, such as soup, pudding, and juicy fruits like oranges or melons, contain liquid. You'll need to count the liquid in those foods as part of your daily fluid intake. Your provider can help you with this.  · Stop smoking.  · Cut back on how much alcohol you drink.  · Lose weight if you are overweight. The excess weight adds a lot of stress on the workload of the heart.  · Stay active. Talk with your provider about an exercise program that is safe for your heart.  · Keep your feet elevated to reduce swelling. Ask your provider about support hose as a preventive treatment for daytime leg swelling.  Besides taking your medicine as instructed, an important part of treatment is lifestyle changes. These include diet, physical  activity, stopping smoking, and weight control.  Improve your diet by including more fresh foods, cutting back on how much sugar and saturated fat you eat, and eating fewer processed foods and less salt.  Follow-up care  Follow up with your healthcare provider, or as advised.  Make sure to keep any appointments that were made for you. These can help better control your congestive heart failure. You will need to follow up with your provider on a routine basis to make sure your heart failure is well managed.  If an X-ray, ECG, or other tests were done, you will be told of any new findings that may affect your care.  Call 911  Call 911 if you:  · Become severely short of breath  · Feel lightheaded, or feel like you might pass out or faint  · Have chest pain or discomfort that is different than usual, the medicines your doctor told you to use for this don't help, or the pain lasts longer than 10 to 15 minutes  · You suddenly develop a rapid heart rate  When to seek medical advice  The following may be signs that your heart failure is getting worse. Call your healthcare provider right away if any of these happen:  · Sudden weight gain. This means 3 or more pounds in one day, or 5 or more pounds in 1 week.  · Trouble breathing not related to being active  · New or increased swelling of your legs or ankles  · Swelling or pain in your abdomen  · Breathing trouble at night. This means waking up short of breath or needing more pillows to breathe.  · Frequent coughing that doesnt go away  · Feeling much more tired than usual  Date Last Reviewed: 1/4/2016  © 5691-5882 [employer]. [address] [place] [address], [place] [address]. All rights reserved. This information is not intended as a substitute for professional medical care. Always follow your healthcare professional's instructions.          Coping with Heart Failure  Its normal to feel sad or down at times when youre living with heart failure. Some medicines can  also affect your mood. Following your treatment plan may seem difficult at times. If you feel overwhelmed, just focus on one day at a time. Dont be afraid to ask others for help when you need it.    Ways to feel better  Try not to withdraw from family and friends, even if you are finding it hard to talk to them. They can still be a good source of support. To feel better, you can also:  · Spend time doing things you enjoy. This may include participating in a favorite hobby, meditating, praying, or spending time with people you care about. Find activities that make you happy and make those a priority.  · Share what you learn about heart failure with the people in your life. Invite family members along when you visit your healthcare provider. This will help you feel supported as well as help you discuss the care plan you've agreed upon with your doctor.  · Think about joining a support group for people with heart failure. It may be easier to talk to people who know firsthand what youre going through. They can offer advice and share stories. You may want to ask loved ones to join you for a meeting.  Asking for help  Having heart failure doesnt mean that you have to feel bad all the time. Consider talking to your healthcare provider or a therapist if:  · You feel worthless or helpless, or are thinking about suicide. These are warning signs of depression. Treatment can help you feel better. When depression is under control, your overall health may also improve.  · You feel anxious about what will happen to your loved ones if your health gets worse. Taking care of legal arrangements, such as a living will and durable power of , can help you feel more secure about the future.  · Social support helps alleviate stress and helps your stick with your healthy lifestyle changes. Without social support, you may end up back in the hospital.  Date Last Reviewed: 3/20/2016  © 0196-3556 [employer] [address]  [place] [address]. All rights reserved. This information is not intended as a substitute for professional medical care. Always follow your healthcare professional's instructions.          Your Scheduled Appointments     Jul 10, 2017  8:20 AM CDT   Debrillator Tune Up with PACEMAKER, ICD   [facility] ([facility] )    [address]  [facility] [address]   [phone]              [facility] Sign-Up     Activating your [facility] account is as easy as 1-2-3!     1) Visit [email or web address], select Sign Up Now, enter this activation code and your date of birth, then select Next.  [email or web address]  Expires: 5/25/2017  3:25 AM      2) Create a username and password to use when you visit [facility] in the future and select a security question in case you lose your password and select Next.    3) Enter your e-mail address and click Sign Up!    Additional Information  If you have questions, please e-mail [email or web address] or call [phone] to talk to our [facility] staff. Remember, [facility] is NOT to be used for urgent needs. For medical emergencies, dial 911.          [facility] complies with applicable Federal civil rights laws and does not discriminate on the basis of race, color, national origin, age, disability, or sex.        Language Assistance Services     ATTENTION: Language assistance services are available, free of charge. Please call [phone].      ATENCIÓN: Si habla español, tiene a mason disposición servicios gratuitos de asistencia lingüística. Llame al [phone].     CHÚ Ý: N?u b?n nói Ti?ng Vi?t, có các d?ch v? h? tr? ngôn ng? mi?n phí dành cho b?n. G?i s? [phone].

## 2017-04-13 NOTE — ED PROVIDER NOTES
"Encounter Date: 4/13/2017       History     Chief Complaint   Patient presents with    Shortness of Breath     x 1 hour withc omplaints of feeling "bloated to abdomen" pt has hx of CHF. took two lasix pta because he was nervous he was getting "fluid overload"     Review of patient's allergies indicates:   Allergen Reactions    Sulfa (sulfonamide antibiotics) Hives     Patient is a 76 y.o. male presenting with the following complaint: shortness of breath. The history is provided by the patient. No  was used.   Shortness of Breath   This is a chronic problem. The problem occurs intermittently.The current episode started less than 1 hour ago. The problem has been gradually improving. Pertinent negatives include no fever, no headaches, no coryza, no rhinorrhea, no sore throat, no swollen glands, no ear pain, no neck pain, no cough, no sputum production, no hemoptysis, no wheezing, no PND, no orthopnea, no chest pain, no syncope, no vomiting, no abdominal pain, no rash, no leg pain, no leg swelling and no claudication. Treatments tried: Patient took 2 extra doses of Lasix before coming to the ED. He has had prior hospitalizations. He has had prior ED visits. He has had no prior ICU admissions. Associated medical issues include heart failure.     Patient was watching TV and had a feeling of bloated abdomen.  No real shortness of breath.  No chest pain fever chills nausea or vomiting.  Patient reports no dietary indiscretion.  Patient took 2 extra doses of Lasix before coming to the hospital and he is already feeling better.  Past Medical History:   Diagnosis Date    Chronic combined systolic and diastolic congestive heart failure     Coronary artery disease     Diabetes mellitus, type II     Hyperlipidemia     Hypertension     Primary cardiomyopathy     Valvular regurgitation     m/s MR    Ventricular tachycardia      Past Surgical History:   Procedure Laterality Date    CARDIAC " DEFIBRILLATOR PLACEMENT  10/2/2008; 1/5/2015    [employer]; converted to biventricular ICD VIVA QUAD XT CRT-D [id number]: Serial No. [id number]    CARDIAC DEFIBRILLATOR PLACEMENT      CORONARY ANGIOPLASTY WITH STENT PLACEMENT  4/15/2008; 10/16/2014    LAD; RCA     Family History   Problem Relation Age of Onset    Diabetes Maternal Aunt      Social History   Substance Use Topics    Smoking status: Never Smoker    Smokeless tobacco: Never Used    Alcohol use No     Review of Systems   Constitutional: Negative for fever.   HENT: Negative for ear pain, rhinorrhea and sore throat.    Respiratory: Positive for shortness of breath. Negative for cough, hemoptysis, sputum production and wheezing.    Cardiovascular: Negative for chest pain, orthopnea, claudication, leg swelling, syncope and PND.   Gastrointestinal: Negative for abdominal pain and vomiting.   Musculoskeletal: Negative for neck pain.   Skin: Negative for rash.   Neurological: Negative for headaches.       Physical Exam   Initial Vitals   BP Pulse Resp Temp SpO2   04/13/17 0229 -- -- -- 04/13/17 0232   116/78    97 %     Physical Exam    Nursing note and vitals reviewed.  Constitutional: He appears well-developed and well-nourished.   Patient is alert cooperative in no distress.   HENT:   Head: Normocephalic.   Right Ear: External ear normal.   Left Ear: External ear normal.   Nose: Nose normal.   Mouth/Throat: Oropharynx is clear and moist.   Eyes: Conjunctivae and EOM are normal. Pupils are equal, round, and reactive to light.   Neck: Normal range of motion. Neck supple. No JVD present.   Cardiovascular: Normal rate, regular rhythm and normal heart sounds.   Pulmonary/Chest: He has rales (Faint rales at the left base.).   Abdominal: Soft. Bowel sounds are normal.   Musculoskeletal: Normal range of motion. He exhibits no edema.   Neurological: He is alert and oriented to person, place, and time.   Skin: Skin is warm and dry.   Psychiatric: He has a normal  mood and affect.         ED Course   Procedures  Labs Reviewed   B-TYPE NATRIURETIC PEPTIDE   COMPREHENSIVE METABOLIC PANEL   CBC W/ AUTO DIFFERENTIAL   TROPONIN I     EKG Readings: (Independently Interpreted)   Paced rhythm.       X-Rays:   Independently Interpreted Readings:   Chest X-Ray: Pulmonary congestion, cardiomegaly, no significant change from 4/10/17.                       ED Course     Clinical Impression:   The primary encounter diagnosis was Chronic systolic congestive heart failure. Diagnoses of SOB (shortness of breath) and Shortness of breath were also pertinent to this visit.          Juan Carlos Rangel MD  04/13/17 0402

## 2017-04-13 NOTE — ED TRIAGE NOTES
"Pt presents to ED c/o shortness of breath x 1 hour associated with abdominal bloating. States he is here to make sure his "CHF isn't acting up." denies pain. No increased swelling. Breath sounds are clear. No resp distress noted. Abdomen soft/nontender. Pt took an extra two lasix just pta. Pt recently seen here for same symptoms.  "

## 2017-04-13 NOTE — DISCHARGE INSTRUCTIONS
Left- or Right- Side Congestive Heart Failure    The heart is a large muscle. It is a pump that circulates blood throughout the body. Blood carries oxygen to all of the organs, including the brain, muscles, and skin. After your body takes the oxygen out of the blood, the blood returns to the heart. The right side of the heart collects the blood from the body and pumps it to the lungs. In the lungs, it gets fresh oxygen and gives up carbon dioxide. The oxygen-rich blood from the lungs then returns to the left side of the heart, where it is pumped back out to the rest of your body, starting the process all over.  Congestive heart failure (CHF) occurs when the heart muscle is weakened. This affects the pumping action of the heart. Heart failure can affect the right side of the heart or the left side. But heart failure may affect not only the right side of the heart or only the left side. Although it may have started on one side, it can and often eventually does affect both sides.  Right-side heart failure  When the right side of the heart is weakened, it cant handle the blood it is getting from the rest of the body. This blood returns to the heart through veins. When too much pressure builds up in the veins, fluid leaks out into the tissues. Gravity then causes that fluid to move to those parts of the body that are the lowest. So one of the first symptoms of right-side CHF can include swelling in the feet and ankles. If the condition gets worse, the swelling can even go up past the knees. Sometimes it gets so severe, the liver can get congested as well.  Left-side heart failure  When the left side of the heart is weakened, it cant handle the blood it gets from the lungs. Pressure then builds up in the veins of the lungs, causing fluid to leak into the lung tissues. This may cause CHF and pulmonary edema. This causes you to feel short of breath, weak, or dizzy. These symptoms are often worse with exertion, such as  when climbing stairs or walking up hills. Lying with your head flat is uncomfortable and can make your breathing worse. This may make sleeping difficult. You may need to use extra pillows to elevate your upper body to sleep well. The same is true when just resting during the daytime.  There are many causes of heart failure including:  · Coronary artery disease  · Past heart attack (also known as acute myocardial infarction, or AMI)  · High blood pressure  · Damaged heart valve  · Diabetes  · Obesity  · Cigarette smoking  · Alcohol abuse  Heart failure is a chronic condition. There is no cure. The purpose of medical treatment is to improve the pumping action of the heart. The main way to do this is to remove excess water from the body. A number of medicines can help reach this goal, improve symptoms, and prevent the heart from becoming weaker. Sometimes, heart failure can become so severe that a device is placed in the heart to help with pumping. Another major goal is to better treat the causes of heart failure, such as diabetes and high blood pressure, by making changes in your lifestyle and maximizing medical control when needed.  Home care  Follow these guidelines when caring for yourself at home:  · Check your weight every day. This is very important because a sudden increase in weight gain could mean worsening heart failure. Keep these things in mind:  ¨ Use the same scale every day.  ¨ Weigh yourself at the same time every day.  ¨ Make sure the scale is on a hard floor surface, not on a rug or carpet.  ¨ Keep a record of your weight every day so your healthcare provider can see it. If you are not given a log sheet for this, keep a separate journal for this purpose.   · Cut back on the amount of salt (sodium) you eat. Follow your healthcare provider's recommendation on how much salt or sodium you should have each day.  ¨ Avoid high-salt foods. These include olives, pickles, smoked meats, salted potato chips, and  most prepared foods.  ¨ Don't add salt to your food at the table. Use only small amounts of salt when cooking.  ¨ Read the labels carefully on food packages to learn how much salt or sodium is in each serving in the package. Remember, a can or package of food may contain more than 1 serving. So if you eat all the food in the package, you may be getting more salt than you think.  · Follow your healthcare provider's recommendations about how much fluid you should have. Be aware that some foods, such as soup, pudding, and juicy fruits like oranges or melons, contain liquid. You'll need to count the liquid in those foods as part of your daily fluid intake. Your provider can help you with this.  · Stop smoking.  · Cut back on how much alcohol you drink.  · Lose weight if you are overweight. The excess weight adds a lot of stress on the workload of the heart.  · Stay active. Talk with your provider about an exercise program that is safe for your heart.  · Keep your feet elevated to reduce swelling. Ask your provider about support hose as a preventive treatment for daytime leg swelling.  Besides taking your medicine as instructed, an important part of treatment is lifestyle changes. These include diet, physical activity, stopping smoking, and weight control.  Improve your diet by including more fresh foods, cutting back on how much sugar and saturated fat you eat, and eating fewer processed foods and less salt.  Follow-up care  Follow up with your healthcare provider, or as advised.  Make sure to keep any appointments that were made for you. These can help better control your congestive heart failure. You will need to follow up with your provider on a routine basis to make sure your heart failure is well managed.  If an X-ray, ECG, or other tests were done, you will be told of any new findings that may affect your care.  Call 911  Call 911 if you:  · Become severely short of breath  · Feel lightheaded, or feel like you  might pass out or faint  · Have chest pain or discomfort that is different than usual, the medicines your doctor told you to use for this don't help, or the pain lasts longer than 10 to 15 minutes  · You suddenly develop a rapid heart rate  When to seek medical advice  The following may be signs that your heart failure is getting worse. Call your healthcare provider right away if any of these happen:  · Sudden weight gain. This means 3 or more pounds in one day, or 5 or more pounds in 1 week.  · Trouble breathing not related to being active  · New or increased swelling of your legs or ankles  · Swelling or pain in your abdomen  · Breathing trouble at night. This means waking up short of breath or needing more pillows to breathe.  · Frequent coughing that doesnt go away  · Feeling much more tired than usual  Date Last Reviewed: 1/4/2016  © 1148-3524 [employer]. [address] [place] [address]. All rights reserved. This information is not intended as a substitute for professional medical care. Always follow your healthcare professional's instructions.          Coping with Heart Failure  Its normal to feel sad or down at times when youre living with heart failure. Some medicines can also affect your mood. Following your treatment plan may seem difficult at times. If you feel overwhelmed, just focus on one day at a time. Dont be afraid to ask others for help when you need it.    Ways to feel better  Try not to withdraw from family and friends, even if you are finding it hard to talk to them. They can still be a good source of support. To feel better, you can also:  · Spend time doing things you enjoy. This may include participating in a favorite hobby, meditating, praying, or spending time with people you care about. Find activities that make you happy and make those a priority.  · Share what you learn about heart failure with the people in your life. Invite family members along when you  visit your healthcare provider. This will help you feel supported as well as help you discuss the care plan you've agreed upon with your doctor.  · Think about joining a support group for people with heart failure. It may be easier to talk to people who know firsthand what youre going through. They can offer advice and share stories. You may want to ask loved ones to join you for a meeting.  Asking for help  Having heart failure doesnt mean that you have to feel bad all the time. Consider talking to your healthcare provider or a therapist if:  · You feel worthless or helpless, or are thinking about suicide. These are warning signs of depression. Treatment can help you feel better. When depression is under control, your overall health may also improve.  · You feel anxious about what will happen to your loved ones if your health gets worse. Taking care of legal arrangements, such as a living will and durable power of , can help you feel more secure about the future.  · Social support helps alleviate stress and helps your stick with your healthy lifestyle changes. Without social support, you may end up back in the hospital.  Date Last Reviewed: 3/20/2016  © 5128-0375 [employer]. [address] [place] [address], [place] [address]. All rights reserved. This information is not intended as a substitute for professional medical care. Always follow your healthcare professional's instructions.

## 2017-04-13 NOTE — PROVIDER PROGRESS NOTES - EMERGENCY DEPT.
Encounter Date: 4/13/2017    ED Physician Progress Notes        Physician Note:   Patient feels much better.  Lab results reviewed with the patient.  He thinks some of his problem may be some anxiety.

## 2017-04-22 ENCOUNTER — HOSPITAL ENCOUNTER (EMERGENCY)
Facility: HOSPITAL | Age: 77
Discharge: HOME | End: 2017-04-22
Attending: EMERGENCY MEDICINE
Payer: MEDICARE

## 2017-04-22 VITALS
HEART RATE: 74 BPM | SYSTOLIC BLOOD PRESSURE: 117 MMHG | HEIGHT: 68 IN | DIASTOLIC BLOOD PRESSURE: 97 MMHG | RESPIRATION RATE: 25 BRPM | WEIGHT: 144 LBS | TEMPERATURE: 98 F | BODY MASS INDEX: 21.82 KG/M2 | OXYGEN SATURATION: 98 %

## 2017-04-22 DIAGNOSIS — I50.9 CONGESTIVE HEART FAILURE, UNSPECIFIED CONGESTIVE HEART FAILURE CHRONICITY, UNSPECIFIED CONGESTIVE HEART FAILURE TYPE: Primary | ICD-10-CM

## 2017-04-22 LAB
ALBUMIN SERPL BCP-MCNC: 3.9 G/DL
ALP SERPL-CCNC: 161 U/L
ALT SERPL W/O P-5'-P-CCNC: 24 U/L
ANION GAP SERPL CALC-SCNC: 11 MMOL/L
AST SERPL-CCNC: 21 U/L
BASOPHILS # BLD AUTO: 0.01 K/UL
BASOPHILS NFR BLD: 0.3 %
BILIRUB SERPL-MCNC: 1.1 MG/DL
BNP SERPL-MCNC: 806 PG/ML
BUN SERPL-MCNC: 45 MG/DL
CALCIUM SERPL-MCNC: 9.3 MG/DL
CHLORIDE SERPL-SCNC: 108 MMOL/L
CO2 SERPL-SCNC: 20 MMOL/L
CREAT SERPL-MCNC: 2 MG/DL
DIFFERENTIAL METHOD: ABNORMAL
EOSINOPHIL # BLD AUTO: 0.1 K/UL
EOSINOPHIL NFR BLD: 1.9 %
ERYTHROCYTE [DISTWIDTH] IN BLOOD BY AUTOMATED COUNT: 13.7 %
EST. GFR  (AFRICAN AMERICAN): 37 ML/MIN/1.73 M^2
EST. GFR  (NON AFRICAN AMERICAN): 32 ML/MIN/1.73 M^2
GLUCOSE SERPL-MCNC: 121 MG/DL
HCT VFR BLD AUTO: 35.6 %
HGB BLD-MCNC: 12 G/DL
INR PPP: 1.1
LYMPHOCYTES # BLD AUTO: 1.2 K/UL
LYMPHOCYTES NFR BLD: 31.7 %
MCH RBC QN AUTO: 32.1 PG
MCHC RBC AUTO-ENTMCNC: 33.7 %
MCV RBC AUTO: 95 FL
MONOCYTES # BLD AUTO: 0.6 K/UL
MONOCYTES NFR BLD: 15.4 %
NEUTROPHILS # BLD AUTO: 1.9 K/UL
NEUTROPHILS NFR BLD: 50.4 %
PLATELET # BLD AUTO: 114 K/UL
PMV BLD AUTO: 11.1 FL
POTASSIUM SERPL-SCNC: 4.7 MMOL/L
PROT SERPL-MCNC: 7 G/DL
PROTHROMBIN TIME: 11.2 SEC
RBC # BLD AUTO: 3.74 M/UL
SODIUM SERPL-SCNC: 139 MMOL/L
TROPONIN I SERPL DL<=0.01 NG/ML-MCNC: 0.05 NG/ML
WBC # BLD AUTO: 3.69 K/UL

## 2017-04-22 PROCEDURE — 93005 ELECTROCARDIOGRAM TRACING: CPT

## 2017-04-22 PROCEDURE — 85610 PROTHROMBIN TIME: CPT

## 2017-04-22 PROCEDURE — 96374 THER/PROPH/DIAG INJ IV PUSH: CPT

## 2017-04-22 PROCEDURE — 63600175 PHARM REV CODE 636 W HCPCS: Performed by: EMERGENCY MEDICINE

## 2017-04-22 PROCEDURE — 85025 COMPLETE CBC W/AUTO DIFF WBC: CPT

## 2017-04-22 PROCEDURE — 93010 ELECTROCARDIOGRAM REPORT: CPT | Mod: ,,, | Performed by: INTERNAL MEDICINE

## 2017-04-22 PROCEDURE — 84484 ASSAY OF TROPONIN QUANT: CPT

## 2017-04-22 PROCEDURE — 83880 ASSAY OF NATRIURETIC PEPTIDE: CPT

## 2017-04-22 PROCEDURE — 80053 COMPREHEN METABOLIC PANEL: CPT

## 2017-04-22 PROCEDURE — 99284 EMERGENCY DEPT VISIT MOD MDM: CPT | Mod: 25

## 2017-04-22 RX ORDER — FUROSEMIDE 10 MG/ML
20 INJECTION INTRAMUSCULAR; INTRAVENOUS
Status: COMPLETED | OUTPATIENT
Start: 2017-04-22 | End: 2017-04-22

## 2017-04-22 RX ADMIN — FUROSEMIDE 20 MG: 10 INJECTION, SOLUTION INTRAMUSCULAR; INTRAVENOUS at 09:04

## 2017-04-22 NOTE — ED AVS SNAPSHOT
[facility]  [address]  [place] [address]               Trever Claros   2017  7:52 PM   ED    Description:  Male : 1940   Department:  [facility]           Your Care was Coordinated By:     Provider Role From To    Tenisha Cruz MD Attending Provider 17 --      Reason for Visit     Shortness of Breath           Diagnoses this Visit        Comments    Congestive heart failure, unspecified congestive heart failure chronicity, unspecified congestive heart failure type    -  Primary       ED Disposition     None           To Do List           Follow-up Information     Follow up with Wiliam Mtz MD In 2 days.    Specialty:  Family Medicine    Contact information:    [address] [facility] [id number] [phone]        [facility]     [facility] Care Line -  Assistance  Unless otherwise directed by your provider, please contact [facility], our nurse care line that is available for  assistance.     Registered nurses in the [facility] provide: appointment scheduling, clinical advisement, health education, and other advisory services.  Call: [phone] (toll free)               Medications           Message regarding Medications     Verify the changes and/or additions to your medication regime listed below are the same as discussed with your clinician today.  If any of these changes or additions are incorrect, please notify your healthcare provider.        These medications were administered today        Dose Freq    furosemide injection 20 mg 20 mg ED 1 Time    Sig: Inject 2 mLs (20 mg total) into the vein ED 1 Time.    Class: Normal    Route: Intravenous           Verify that the below list of medications is an accurate representation of the medications you are currently taking.  If none reported, the list may be blank. If incorrect, please contact your healthcare provider. Carry  this list with you in case of emergency.           Current Medications     allopurinol (ZYLOPRIM) 100 MG tablet Take 100 mg by mouth once daily.    aspirin (ECOTRIN) 81 MG EC tablet Take 81 mg by mouth once daily.    atorvastatin (LIPITOR) 20 MG tablet Take 1 tablet (20 mg total) by mouth every evening.    benzonatate (TESSALON) 200 MG capsule Take 1 capsule (200 mg total) by mouth 4 (four) times daily as needed for Cough.    carvedilol (COREG) 12.5 MG tablet Take 1 tablet (12.5 mg total) by mouth 2 (two) times daily.    clopidogrel (PLAVIX) 75 mg tablet Take 1 tablet (75 mg total) by mouth once daily.    escitalopram oxalate (LEXAPRO) 10 MG tablet Take 1 tablet (10 mg total) by mouth once daily. For depression    finasteride (PROSCAR) 5 mg tablet Take 5 mg by mouth once daily.    fosinopril (MONOPRIL) 20 MG tablet Take 1 tablet (20 mg total) by mouth once daily.    furosemide (LASIX) 20 MG tablet Take 1 tablet (20 mg) every Sunday, Monday, Wednesday, Friday    furosemide injection 20 mg Inject 2 mLs (20 mg total) into the vein ED 1 Time.    guaifenesin-codeine 100-10 mg/5 ml (CHERATUSSIN AC)  mg/5 mL syrup Take 10 mLs by mouth every 4 (four) hours as needed for Cough.    isosorbide mononitrate (IMDUR) 60 MG 24 hr tablet Take 1 tablet (60 mg total) by mouth once daily.    nitroGLYCERIN (NITROSTAT) 0.4 MG SL tablet Place 1 tablet (0.4 mg total) under the tongue every 5 (five) minutes as needed for Chest pain (maximum 3 doses in 24 hours).    polyethylene glycol (GLYCOLAX) 17 gram/dose powder TAKE 17 GRAMS (1 CAPFUL) BY MOUTH NIGHTLY.    ranitidine (ZANTAC) 300 MG capsule Take 1 capsule (300 mg total) by mouth every evening.    saw palmetto 500 MG capsule Take 450 mg by mouth once daily.     tamsulosin (FLOMAX) 0.4 mg Cp24 Take 1 capsule by mouth 2 (two) times daily.     TRADJENTA 5 mg Tab tablet TAKE 1 TABLET BY MOUTH EACH DAY    URELLE 81-0.12 mg Tab Take 1 tablet by mouth as needed.            Clinical  "Reference Information           Your Vitals Were     BP Pulse Temp Resp Height Weight    102/67 (BP Location: Right arm, Patient Position: Sitting) 88 97.7 °F (36.5 °C) (Oral) 16 5' 8" (1.727 m) 65.3 kg (144 lb)    SpO2 BMI             100% 21.9 kg/m2         Allergies as of 4/22/2017        Reactions    Sulfa (Sulfonamide Antibiotics) Hives      Immunizations Administered on Date of Encounter - 4/22/2017     None      ED Micro, Lab, POCT     Start Ordered       Status Ordering Provider    04/22/17 1957 04/22/17 1956  CBC auto differential  STAT      Final result     04/22/17 1957 04/22/17 1956  Comprehensive metabolic panel  STAT      Final result     04/22/17 1957 04/22/17 1956  Troponin I  STAT      Final result     04/22/17 1957 04/22/17 1956  Brain natriuretic peptide  STAT      Final result     04/22/17 1957 04/22/17 1956  Protime-INR  Once      Final result       ED Imaging Orders     Start Ordered       Status Ordering Provider    04/22/17 1957 04/22/17 1956  X-Ray Chest AP Portable  1 time imaging      Final result         Discharge Instructions         CALL YOUR PRIMARY DOCTOR TO SCHEDULE A FOLLOW UP APPOINTMENT ON Monday    RETURN TO ED IMMEDIATELY IF SYMPTOMS WORSEN    Taking Medicine to Control Heart Failure  The heart is a muscle that pumps oxygen-rich blood to all parts of the body. When you have heart failure, the heart is not able to pump as well as it should. Blood and fluid may back up into the lungs (congestive heart failure), and some parts of the body dont get enough oxygen-rich blood to work normally. These problems lead to the symptoms of heart failure.  Medications can help your heart work better, but follow your doctor's directions exactly to ensure the best result.     Have all your prescriptions filled. Talk to a pharmacist if you have questions.     Why take your medicine?  · They help you feel better. That means you can do more of the things you enjoy.  · They help your heart work " better.  · They can help you stay out of the hospital.  · They can prevent shortness of breath and swelling in your feet.  · They can improve blood flow to the rest of your body and prevent other organs from being affected by your heart's decreased function.  Know your medicines  You may take one or more of the medications below. Be sure you know which ones you take:  · ACE inhibitors lower blood pressure and decrease strain on the heart. This makes it easier for the heart to pump. These also help remodel the heart which can promote improved pumping ability.  · Angiotensin receptor blockers (ARBs) work like ACE inhibitors. These are prescribed for some people who can't take ACE inhibitors. Some people who take ACE inhibitors develop a cough.  · Beta-blockers help lower blood pressure and slow your heart rate. This lessens the work your heart has to do. Beta-blockers may improve the hearts pumping action and strength over time. If you have severe pulmonary disease, you may not be able to take these medicines.  · Diuretics (water pills) help the body get rid of excess water by excreting salt. This helps prevent swelling, especially in your ankles. They can also help you breath better if you have fluid in your lungs. Having less fluid to pump means your heart doesnt have to work as hard. A side effect of this medicine is having to urinate more often. Some diuretics make your body lose a mineral called potassium. Your doctor will tell you if you need to take supplements or eat more foods high in potassium.  · Digoxin helps your heart pump with more strength. This helps your heart pump more blood with each beat. So, more oxygen-rich blood travels to the rest of the body.  · Aldosterone blockers help alter hormones and decrease strain on the heart.  · Hydralazine and nitrates are two separate medications used together to treat heart failure. They may come in one combination pill. They lower blood pressure and  decrease how hard the heart has to pump.  Tips for taking your medicine  · Take your medications exactly as directed. Follow the directions on the label.  · Take your medications at the same time or times each day.  · If you miss a dose, take it as soon as you remember -- unless its almost time for your next dose. If so, skip the missed dose. Do not take a double dose.  · Never change the dose or stop taking a medication unless your doctor tells you. Please tell your doctor if you don't understand how to take your medications, or if you are having difficulty getting your medications.  · If you miss too many doses, you are at risk for being admitted to the hospital for shortness of breath and worsening of heart failure symptoms.  Date Last Reviewed: 4/1/2016  © 1144-3029 [employer]. [address] [place] [address], [place] [address]. All rights reserved. This information is not intended as a substitute for professional medical care. Always follow your healthcare professional's instructions.          Your Scheduled Appointments     Jul 10, 2017  8:20 AM CDT   Debrillator Tune Up with PACEMAKER, ICD   [facility] - Arrhythmia ([facility] )    [address]  [facility] [address]   [phone]              [facility] Sign-Up     Activating your [facility] account is as easy as 1-2-3!     1) Visit [email or web address], select Sign Up Now, enter this activation code and your date of birth, then select Next.  [email or web address]  Expires: 5/25/2017  3:25 AM      2) Create a username and password to use when you visit [facility] in the future and select a security question in case you lose your password and select Next.    3) Enter your e-mail address and click Sign Up!    Additional Information  If you have questions, please e-mail [email or web address] or call [phone] to talk to our [facility] staff. Remember, [facility] is NOT to be used for urgent needs. For medical emergencies, dial 911.           [facility] complies with applicable Federal civil rights laws and does not discriminate on the basis of race, color, national origin, age, disability, or sex.        Language Assistance Services     ATTENTION: Language assistance services are available, free of charge. Please call [phone].      ATENCIÓN: Si habla español, tiene a mason disposición servicios gratuitos de asistencia lingüística. Llame al [phone].     CHÚ Ý: N?u b?n nói Ti?ng Vi?t, có các d?ch v? h? tr? ngôn ng? mi?n phí dành cho b?n. G?i s? [phone].

## 2017-04-23 ENCOUNTER — HOSPITAL ENCOUNTER (EMERGENCY)
Facility: HOSPITAL | Age: 77
Discharge: HOME | End: 2017-04-23
Attending: EMERGENCY MEDICINE
Payer: MEDICARE

## 2017-04-23 VITALS
DIASTOLIC BLOOD PRESSURE: 62 MMHG | HEIGHT: 68 IN | WEIGHT: 144 LBS | BODY MASS INDEX: 21.82 KG/M2 | HEART RATE: 69 BPM | RESPIRATION RATE: 16 BRPM | TEMPERATURE: 98 F | SYSTOLIC BLOOD PRESSURE: 102 MMHG | OXYGEN SATURATION: 100 %

## 2017-04-23 DIAGNOSIS — I50.9 CHRONIC CONGESTIVE HEART FAILURE, UNSPECIFIED CONGESTIVE HEART FAILURE TYPE: Primary | ICD-10-CM

## 2017-04-23 DIAGNOSIS — R06.02 SOB (SHORTNESS OF BREATH): ICD-10-CM

## 2017-04-23 PROCEDURE — 93005 ELECTROCARDIOGRAM TRACING: CPT

## 2017-04-23 PROCEDURE — 99283 EMERGENCY DEPT VISIT LOW MDM: CPT | Mod: 25

## 2017-04-23 PROCEDURE — 99284 EMERGENCY DEPT VISIT MOD MDM: CPT | Mod: 25

## 2017-04-23 RX ORDER — FUROSEMIDE 20 MG/1
20 TABLET ORAL DAILY
Qty: 30 TABLET | Refills: 2 | Status: SHIPPED | OUTPATIENT
Start: 2017-04-23 | End: 2017-06-06 | Stop reason: SDUPTHER

## 2017-04-23 NOTE — ED AVS SNAPSHOT
[facility]  [address] [place] [address]               Trever Claros   2017  7:29 PM   ED    Description:  Male : 1940   Department:  [facility]           Your Care was Coordinated By:     Provider Role From To    Lyn Mead MD Attending Provider 17 1930 --      Reason for Visit     Panic Attack     Shortness of Breath           Diagnoses this Visit        Comments    Chronic congestive heart failure, unspecified congestive heart failure type    -  Primary     SOB (shortness of breath)           ED Disposition     ED Disposition Condition Comment    Discharge             To Do List           Follow-up Information     Follow up with Wiliam Mtz MD In 1 week(s).    Specialty:  Family Medicine    Contact information:    [address] [facility] [address]  [phone]         These Medications        Disp Refills Start End    furosemide (LASIX) 20 MG tablet 30 tablet 2 2017    Take 1 tablet (20 mg total) by mouth once daily. - Oral    Pharmacy: [facility]/pharmacy #[id number] - [address] AT [facility] Ph #: [phone]         [facility]     [facility] -  Assistance  Unless otherwise directed by your provider, please contact [facility], our nurse care line that is available for  assistance.     Registered nurses in the [facility] provide: appointment scheduling, clinical advisement, health education, and other advisory services.  Call: [phone] (toll free)               Medications           Message regarding Medications     Verify the changes and/or additions to your medication regime listed below are the same as discussed with your clinician today.  If any of these changes or additions are incorrect, please notify your healthcare provider.        START taking these NEW medications        Refills    furosemide (LASIX) 20 MG  tablet 2    Sig: Take 1 tablet (20 mg total) by mouth once daily.    Class: Print    Route: Oral           Verify that the below list of medications is an accurate representation of the medications you are currently taking.  If none reported, the list may be blank. If incorrect, please contact your healthcare provider. Carry this list with you in case of emergency.           Current Medications     allopurinol (ZYLOPRIM) 100 MG tablet Take 100 mg by mouth once daily.    aspirin (ECOTRIN) 81 MG EC tablet Take 81 mg by mouth once daily.    atorvastatin (LIPITOR) 20 MG tablet Take 1 tablet (20 mg total) by mouth every evening.    benzonatate (TESSALON) 200 MG capsule Take 1 capsule (200 mg total) by mouth 4 (four) times daily as needed for Cough.    carvedilol (COREG) 12.5 MG tablet Take 1 tablet (12.5 mg total) by mouth 2 (two) times daily.    clopidogrel (PLAVIX) 75 mg tablet Take 1 tablet (75 mg total) by mouth once daily.    escitalopram oxalate (LEXAPRO) 10 MG tablet Take 1 tablet (10 mg total) by mouth once daily. For depression    finasteride (PROSCAR) 5 mg tablet Take 5 mg by mouth once daily.    fosinopril (MONOPRIL) 20 MG tablet Take 1 tablet (20 mg total) by mouth once daily.    furosemide (LASIX) 20 MG tablet Take 1 tablet (20 mg) every Sunday, Monday, Wednesday, Friday    furosemide (LASIX) 20 MG tablet Take 1 tablet (20 mg total) by mouth once daily.    furosemide injection 20 mg Inject 2 mLs (20 mg total) into the vein ED 1 Time.    guaifenesin-codeine 100-10 mg/5 ml (CHERATUSSIN AC)  mg/5 mL syrup Take 10 mLs by mouth every 4 (four) hours as needed for Cough.    isosorbide mononitrate (IMDUR) 60 MG 24 hr tablet Take 1 tablet (60 mg total) by mouth once daily.    nitroGLYCERIN (NITROSTAT) 0.4 MG SL tablet Place 1 tablet (0.4 mg total) under the tongue every 5 (five) minutes as needed for Chest pain (maximum 3 doses in 24 hours).    polyethylene glycol (GLYCOLAX) 17 gram/dose powder TAKE 17 GRAMS (1  "CAPFUL) BY MOUTH NIGHTLY.    ranitidine (ZANTAC) 300 MG capsule Take 1 capsule (300 mg total) by mouth every evening.    saw palmetto 500 MG capsule Take 450 mg by mouth once daily.     tamsulosin (FLOMAX) 0.4 mg Cp24 Take 1 capsule by mouth 2 (two) times daily.     TRADJENTA 5 mg Tab tablet TAKE 1 TABLET BY MOUTH EACH DAY    URELLE 81-0.12 mg Tab Take 1 tablet by mouth as needed.            Clinical Reference Information           Your Vitals Were     BP Pulse Temp Resp Height Weight    105/74 (BP Location: Right arm, Patient Position: Lying) 77 97.8 °F (36.6 °C) (Oral) 22 5' 8" (1.727 m) 65.3 kg (144 lb)    SpO2 BMI             100% 21.9 kg/m2         Allergies as of 4/23/2017        Reactions    Sulfa (Sulfonamide Antibiotics) Hives      Immunizations Administered on Date of Encounter - 4/23/2017     None      ED Micro, Lab, POCT     None      ED Imaging Orders     None        Discharge Instructions           Left- or Right- Side Congestive Heart Failure    The heart is a large muscle. It is a pump that circulates blood throughout the body. Blood carries oxygen to all of the organs, including the brain, muscles, and skin. After your body takes the oxygen out of the blood, the blood returns to the heart. The right side of the heart collects the blood from the body and pumps it to the lungs. In the lungs, it gets fresh oxygen and gives up carbon dioxide. The oxygen-rich blood from the lungs then returns to the left side of the heart, where it is pumped back out to the rest of your body, starting the process all over.  Congestive heart failure (CHF) occurs when the heart muscle is weakened. This affects the pumping action of the heart. Heart failure can affect the right side of the heart or the left side. But heart failure may affect not only the right side of the heart or only the left side. Although it may have started on one side, it can and often eventually does affect both sides.  Right-side heart failure  When " the right side of the heart is weakened, it cant handle the blood it is getting from the rest of the body. This blood returns to the heart through veins. When too much pressure builds up in the veins, fluid leaks out into the tissues. Gravity then causes that fluid to move to those parts of the body that are the lowest. So one of the first symptoms of right-side CHF can include swelling in the feet and ankles. If the condition gets worse, the swelling can even go up past the knees. Sometimes it gets so severe, the liver can get congested as well.  Left-side heart failure  When the left side of the heart is weakened, it cant handle the blood it gets from the lungs. Pressure then builds up in the veins of the lungs, causing fluid to leak into the lung tissues. This may cause CHF and pulmonary edema. This causes you to feel short of breath, weak, or dizzy. These symptoms are often worse with exertion, such as when climbing stairs or walking up hills. Lying with your head flat is uncomfortable and can make your breathing worse. This may make sleeping difficult. You may need to use extra pillows to elevate your upper body to sleep well. The same is true when just resting during the daytime.  There are many causes of heart failure including:  · Coronary artery disease  · Past heart attack (also known as acute myocardial infarction, or AMI)  · High blood pressure  · Damaged heart valve  · Diabetes  · Obesity  · Cigarette smoking  · Alcohol abuse  Heart failure is a chronic condition. There is no cure. The purpose of medical treatment is to improve the pumping action of the heart. The main way to do this is to remove excess water from the body. A number of medicines can help reach this goal, improve symptoms, and prevent the heart from becoming weaker. Sometimes, heart failure can become so severe that a device is placed in the heart to help with pumping. Another major goal is to better treat the causes of heart failure,  such as diabetes and high blood pressure, by making changes in your lifestyle and maximizing medical control when needed.  Home care  Follow these guidelines when caring for yourself at home:  · Check your weight every day. This is very important because a sudden increase in weight gain could mean worsening heart failure. Keep these things in mind:  ¨ Use the same scale every day.  ¨ Weigh yourself at the same time every day.  ¨ Make sure the scale is on a hard floor surface, not on a rug or carpet.  ¨ Keep a record of your weight every day so your healthcare provider can see it. If you are not given a log sheet for this, keep a separate journal for this purpose.   · Cut back on the amount of salt (sodium) you eat. Follow your healthcare provider's recommendation on how much salt or sodium you should have each day.  ¨ Avoid high-salt foods. These include olives, pickles, smoked meats, salted potato chips, and most prepared foods.  ¨ Don't add salt to your food at the table. Use only small amounts of salt when cooking.  ¨ Read the labels carefully on food packages to learn how much salt or sodium is in each serving in the package. Remember, a can or package of food may contain more than 1 serving. So if you eat all the food in the package, you may be getting more salt than you think.  · Follow your healthcare provider's recommendations about how much fluid you should have. Be aware that some foods, such as soup, pudding, and juicy fruits like oranges or melons, contain liquid. You'll need to count the liquid in those foods as part of your daily fluid intake. Your provider can help you with this.  · Stop smoking.  · Cut back on how much alcohol you drink.  · Lose weight if you are overweight. The excess weight adds a lot of stress on the workload of the heart.  · Stay active. Talk with your provider about an exercise program that is safe for your heart.  · Keep your feet elevated to reduce swelling. Ask your provider  about support hose as a preventive treatment for daytime leg swelling.  Besides taking your medicine as instructed, an important part of treatment is lifestyle changes. These include diet, physical activity, stopping smoking, and weight control.  Improve your diet by including more fresh foods, cutting back on how much sugar and saturated fat you eat, and eating fewer processed foods and less salt.  Follow-up care  Follow up with your healthcare provider, or as advised.  Make sure to keep any appointments that were made for you. These can help better control your congestive heart failure. You will need to follow up with your provider on a routine basis to make sure your heart failure is well managed.  If an X-ray, ECG, or other tests were done, you will be told of any new findings that may affect your care.  Call 911  Call 911 if you:  · Become severely short of breath  · Feel lightheaded, or feel like you might pass out or faint  · Have chest pain or discomfort that is different than usual, the medicines your doctor told you to use for this don't help, or the pain lasts longer than 10 to 15 minutes  · You suddenly develop a rapid heart rate  When to seek medical advice  The following may be signs that your heart failure is getting worse. Call your healthcare provider right away if any of these happen:  · Sudden weight gain. This means 3 or more pounds in one day, or 5 or more pounds in 1 week.  · Trouble breathing not related to being active  · New or increased swelling of your legs or ankles  · Swelling or pain in your abdomen  · Breathing trouble at night. This means waking up short of breath or needing more pillows to breathe.  · Frequent coughing that doesnt go away  · Feeling much more tired than usual  Date Last Reviewed: 1/4/2016  © 5309-0874 [employer]. [address] [place] [address], [place] [address]. All rights reserved. This information is not intended as a substitute for professional medical  care. Always follow your healthcare professional's instructions.          Coping with Heart Failure  Its normal to feel sad or down at times when youre living with heart failure. Some medicines can also affect your mood. Following your treatment plan may seem difficult at times. If you feel overwhelmed, just focus on one day at a time. Dont be afraid to ask others for help when you need it.    Ways to feel better  Try not to withdraw from family and friends, even if you are finding it hard to talk to them. They can still be a good source of support. To feel better, you can also:  · Spend time doing things you enjoy. This may include participating in a favorite hobby, meditating, praying, or spending time with people you care about. Find activities that make you happy and make those a priority.  · Share what you learn about heart failure with the people in your life. Invite family members along when you visit your healthcare provider. This will help you feel supported as well as help you discuss the care plan you've agreed upon with your doctor.  · Think about joining a support group for people with heart failure. It may be easier to talk to people who know firsthand what youre going through. They can offer advice and share stories. You may want to ask loved ones to join you for a meeting.  Asking for help  Having heart failure doesnt mean that you have to feel bad all the time. Consider talking to your healthcare provider or a therapist if:  · You feel worthless or helpless, or are thinking about suicide. These are warning signs of depression. Treatment can help you feel better. When depression is under control, your overall health may also improve.  · You feel anxious about what will happen to your loved ones if your health gets worse. Taking care of legal arrangements, such as a living will and durable power of , can help you feel more secure about the future.  · Social support helps alleviate stress  and helps your stick with your healthy lifestyle changes. Without social support, you may end up back in the hospital.  Date Last Reviewed: 3/20/2016  © 8755-3935 [employer]. [address] [place] [address], [place] [address]. All rights reserved. This information is not intended as a substitute for professional medical care. Always follow your healthcare professional's instructions.          Your Scheduled Appointments     Jul 10, 2017  8:20 AM CDT   Debrillator Tune Up with PACEMAKER, ICD   [facility] ([facility] )    [address]  [place] [address]   [phone]              [facility] Sign-Up     Activating your [facility] account is as easy as 1-2-3!     1) Visit [email or web address], select Sign Up Now, enter this activation code and your date of birth, then select Next.  [email or web address]  Expires: 5/25/2017  3:25 AM      2) Create a username and password to use when you visit [facility] in the future and select a security question in case you lose your password and select Next.    3) Enter your e-mail address and click Sign Up!    Additional Information  If you have questions, please e-mail [email or web address] or call [phone] to talk to our [facility] staff. Remember, [facility] is NOT to be used for urgent needs. For medical emergencies, dial 911          [facility] complies with applicable Federal civil rights laws and does not discriminate on the basis of race, color, national origin, age, disability, or sex.        Language Assistance Services     ATTENTION: Language assistance services are available, free of charge. Please call [phone].      ATENCIÓN: Si habla sarah, tiene a mason disposición servicios gratuitos de asistencia lingüística. Llame al [phone].     CHÚ Ý: N?u b?n nói Ti?ng Vi?t, có các d?ch v? h? tr? ngôn ng? mi?n phí dành cho b?n. G?i s? [phone].

## 2017-04-23 NOTE — ED PROVIDER NOTES
"Encounter Date: 4/22/2017       History     Chief Complaint   Patient presents with    Shortness of Breath     Onset a couple nights ago, and feeling of heart pounding.  States took 1 SL NTG PTA.      Review of patient's allergies indicates:   Allergen Reactions    Sulfa (sulfonamide antibiotics) Hives     HPI Comments: 75 Y/O MALE WITH A HX OF CHF, CAD, DM, HLD, HTN, CARDIOMYOPATHY, AND VENTRICULAR TACHYCARDIA WITH A DEFIBRILLATOR PRESENTS WITH "FEELING ANXIOUS" AND SOB AT TIMES.  THESE SYMPTOMS HAVE BEEN ONGOING FOR 3-4 DAYS.  PT TAKES LASIX 20 MG 4 DAYS A WEEK.  HIS LAST DOSE WAS YESTERDAY AND HE WAS NOT SUPPOSED TO TAKE HIS LASIX TODAY.  PT CAME TO ED BECAUSE HE HAS BEGUN FEELING ANXIOUS AND FEELS LIKE HE BREATHES FASTER AT TIMES.  + NASAL CONGESTION.  PT DENIES ANY JESUS, ORTHOPNEA, OR PND.  NO COUGH, SPUTUM, OR CHEST PAIN.   PT TOOK A NITRO PTA BECAUSE HE FELT LIKE IT MIGHT HELP HIS ANXIETY.  PT DENIES ANY CHEST PAIN OR PRESSURE.  NO PALPITATIONS.  PT DOES REPORT DECREASED PO INTAKE.  FAMILY AT BEDSIDE AGREES THAT PATIENT HAS NOT HAD ANY SOB WITH EXERTION AND HAS NOT HAD ANY CHEST PAIN.  PT DAUGHTER STATES THAT SHE IS CONCERNED THE PATIENT IS NOT GETTING ENOUGH NUTRIENTS BECAUSE HE IS NOT EATING MUCH DURING THE DAY.  SHE WOULD LIKE FOR HIS ELECTROLYTES TO BE CHECKED WHILE HE IS IN ED.     The history is provided by the patient and a relative. No  was used.     Past Medical History:   Diagnosis Date    Chronic combined systolic and diastolic congestive heart failure     Coronary artery disease     Diabetes mellitus, type II     Hyperlipidemia     Hypertension     Primary cardiomyopathy     Valvular regurgitation     m/s MR    Ventricular tachycardia      Past Surgical History:   Procedure Laterality Date    CARDIAC DEFIBRILLATOR PLACEMENT  10/2/2008; 1/5/2015    [employer]; converted to biventricular ICD VIVA QUAD XT CRT-D [id number]: Serial No. [id number]    CARDIAC DEFIBRILLATOR " PLACEMENT      CORONARY ANGIOPLASTY WITH STENT PLACEMENT  4/15/2008; 10/16/2014    LAD; RCA     Family History   Problem Relation Age of Onset    Diabetes Maternal Aunt      Social History   Substance Use Topics    Smoking status: Never Smoker    Smokeless tobacco: Never Used    Alcohol use No     Review of Systems   Constitutional: Negative for activity change, appetite change, chills, diaphoresis, fatigue and fever.   HENT: Positive for congestion and rhinorrhea. Negative for postnasal drip, sinus pressure and sore throat.    Eyes: Negative for photophobia and visual disturbance.   Respiratory: Positive for shortness of breath. Negative for cough, chest tightness and wheezing.    Cardiovascular: Negative for chest pain, palpitations and leg swelling.   Gastrointestinal: Negative for abdominal pain, diarrhea, nausea and vomiting.   Endocrine: Negative for polydipsia and polyphagia.        + DECREASED PO INTAKE   Genitourinary: Negative for difficulty urinating and dysuria.   Musculoskeletal: Negative for back pain and neck pain.   Skin: Negative for pallor and rash.   Allergic/Immunologic: Negative for immunocompromised state.   Neurological: Negative for dizziness, weakness and headaches.   Hematological: Does not bruise/bleed easily.   Psychiatric/Behavioral: Negative for agitation and confusion. The patient is nervous/anxious.    All other systems reviewed and are negative.      Physical Exam   Initial Vitals   BP Pulse Resp Temp SpO2   04/22/17 1848 04/22/17 1848 04/22/17 1848 04/22/17 1848 04/22/17 1848   102/67 88 16 97.7 °F (36.5 °C) 100 %     Physical Exam    Nursing note and vitals reviewed.  Constitutional: He appears well-developed and well-nourished. He is not diaphoretic. No distress.   WELL APPEARING 75 Y/O MALE IN NO DISTRESS, TALKING IN COMPLETE SENTENCES WITH SAT OF 98% ON RA.     HENT:   Head: Normocephalic and atraumatic.   Mouth/Throat: Oropharynx is clear and moist.   Eyes: Conjunctivae  and EOM are normal. Pupils are equal, round, and reactive to light. No scleral icterus.   Neck: Normal range of motion. Neck supple.   Cardiovascular: Normal rate, regular rhythm and normal heart sounds. Exam reveals no gallop and no friction rub.    No murmur heard.  Pulmonary/Chest: No respiratory distress. He exhibits no tenderness.   MILD CRACKLES LEFT BASE   Abdominal: Soft. Bowel sounds are normal. He exhibits no distension. There is no tenderness.   Musculoskeletal: Normal range of motion. He exhibits no edema or tenderness.   Lymphadenopathy:     He has no cervical adenopathy.   Neurological: He is alert and oriented to person, place, and time. He has normal strength. No sensory deficit.   Skin: Skin is warm and dry. No rash noted. No erythema.   Psychiatric: His behavior is normal. Judgment and thought content normal.   ANXIOUS AND COOPERATIVE         ED Course   Procedures  Labs Reviewed   CBC W/ AUTO DIFFERENTIAL - Abnormal; Notable for the following:        Result Value    WBC 3.69 (*)     RBC 3.74 (*)     Hemoglobin 12.0 (*)     Hematocrit 35.6 (*)     MCH 32.1 (*)     Platelets 114 (*)     Mono% 15.4 (*)     All other components within normal limits   COMPREHENSIVE METABOLIC PANEL - Abnormal; Notable for the following:     CO2 20 (*)     Glucose 121 (*)     BUN, Bld 45 (*)     Creatinine 2.0 (*)     Total Bilirubin 1.1 (*)     Alkaline Phosphatase 161 (*)     eGFR if  37 (*)     eGFR if non  32 (*)     All other components within normal limits   TROPONIN I - Abnormal; Notable for the following:     Troponin I 0.047 (*)     All other components within normal limits   B-TYPE NATRIURETIC PEPTIDE - Abnormal; Notable for the following:      (*)     All other components within normal limits   PROTIME-INR     EKG Readings: (Independently Interpreted)   Initial Reading: No STEMI. Rhythm: Paced Rhythm. Heart Rate: 86. Clinical Impression: Paced Rhythm          Medical  "Decision Making:   Initial Assessment:   77 Y/O MALE WITH A HX OF CHF, CAD, DM, HLD, HTN, CARDIOMYOPATHY, AND VENTRICULAR TACHYCARDIA WITH A DEFIBRILLATOR PRESENTS WITH "FEELING ANXIOUS" AND SOB AT TIMES.  PT TAKES LASIX 20 MG 4 DAYS A WEEK.  HIS LAST DOSE WAS YESTERDAY AND HE WAS NOT SUPPOSED TO TAKE HIS LASIX TODAY.  PT CAME TO ED BECAUSE HE HAS BEGUN FEELING ANXIOUS AND FEELS LIKE HE BREATHES FASTER AT TIMES.  + NASAL CONGESTION.  PT DENIES ANY JESUS, ORTHOPNEA, OR PND.  NO COUGH, SPUTUM, OR CHEST PAIN.   PT TOOK A NITRO PTA BECAUSE HE FELT LIKE IT MIGHT HELP HIS ANXIETY.  PT DENIES ANY CHEST PAIN OR PRESSURE.  NO PALPITATIONS.  PT DOES REPORT DECREASED PO INTAKE.  FAMILY AT BEDSIDE AGREES THAT PATIENT HAS NOT HAD ANY SOB WITH EXERTION AND HAS NOT HAD ANY CHEST PAIN.  PT DAUGHTER STATES THAT SHE IS CONCERNED THE PATIENT IS NOT GETTING ENOUGH NUTRIENTS BECAUSE HE IS NOT EATING MUCH DURING THE DAY.  SHE WOULD LIKE FOR HIS ELECTROLYTES TO BE CHECKED WHILE HE IS IN ED.     ON EXAM, PT IS NOT IN ANY DISTRESS.  HE HAS EVEN NON-LABORED RESPIRATIONS.      Differential Diagnosis:   DDX:  CHF EXACERBATION, PLEURAL EFFUSION, PNEUMONIA, URI, BRONCHITIS, ANXIETY, DEHYDRATION, METABOLIC ABNORMALITY, ACS  Independently Interpreted Test(s):   I have ordered and independently interpreted EKG Reading(s) - see prior notes  Clinical Tests:   Lab Tests: Ordered and Reviewed       <> Summary of Lab: ELEVATED BNP, CKD  H/H STABLE  TROP UNCHANGED FROM PREVIOUS ED VISIT LAST WEEK  Radiological Study: Ordered and Reviewed  Medical Tests: Ordered and Reviewed  ED Management:  CXR AND BNP CONSISTENT WITH FLUID OVERLOAD.  PT IS COMFORTABLE AND CONTINUES TO DENY ANY CHEST PAIN.  I HAVE REVIEWED RESULTS WITH THE FAMILY AND THEY AGREE WITH ONE DOSE OF LASIX IV IN ED TO TREAT THE FLUID OVERLOAD.  PT DOES NOT WANT TO BE ADMITTED STATING THE FEELS GOOD.  PT HAD LABS DONE ON 04/13/17 AND HIS TROP IS ESSENTIALLY UNCHANGED.  THE BNP IS ELEVATED " COMPARED TO LAST VALUE AND CXR SHOWS FINDINGS OF CHF.  PT IS STABLE AND WELL APPEARING AND DENIES ANY CHEST PAIN OR SOB CURRENTLY.  HE DOES NOT WANT TO BE ADMITTED.  PT DOES HAVE A PCP AND A CARDIOLOGIST THAT HE WILL CALL TO SCHEDULE F/U WITH ON Monday. THE PATIENT WILL RETURN TO ED IMMEDIATELY IF SYMPTOMS WORSEN.  PT FAMILY AGREES THAT THEY WILL RETURN WITH ANY WORSENING CONDITION OR CONCERNS.      Pt counseled on their diagnosis and the importance of following up with PCP.  Pt also cautioned on when to return to ED.  Pt verbalizes understanding of discharge plan and will return to ED immediately if symptoms worsen                     ED Course     Clinical Impression:   The encounter diagnosis was Congestive heart failure, unspecified congestive heart failure chronicity, unspecified congestive heart failure type.    Disposition:   Disposition: Discharged  Condition: Stable       Tenisha Cruz MD  04/22/17 2212       Tenisha Cruz MD  04/22/17 2213       Tenisha Cruz MD  04/22/17 [id number]

## 2017-04-23 NOTE — DISCHARGE INSTRUCTIONS
CALL YOUR PRIMARY DOCTOR TO SCHEDULE A FOLLOW UP APPOINTMENT ON Monday    RETURN TO ED IMMEDIATELY IF SYMPTOMS WORSEN    Taking Medicine to Control Heart Failure  The heart is a muscle that pumps oxygen-rich blood to all parts of the body. When you have heart failure, the heart is not able to pump as well as it should. Blood and fluid may back up into the lungs (congestive heart failure), and some parts of the body dont get enough oxygen-rich blood to work normally. These problems lead to the symptoms of heart failure.  Medications can help your heart work better, but follow your doctor's directions exactly to ensure the best result.     Have all your prescriptions filled. Talk to a pharmacist if you have questions.     Why take your medicine?  · They help you feel better. That means you can do more of the things you enjoy.  · They help your heart work better.  · They can help you stay out of the hospital.  · They can prevent shortness of breath and swelling in your feet.  · They can improve blood flow to the rest of your body and prevent other organs from being affected by your heart's decreased function.  Know your medicines  You may take one or more of the medications below. Be sure you know which ones you take:  · ACE inhibitors lower blood pressure and decrease strain on the heart. This makes it easier for the heart to pump. These also help remodel the heart which can promote improved pumping ability.  · Angiotensin receptor blockers (ARBs) work like ACE inhibitors. These are prescribed for some people who can't take ACE inhibitors. Some people who take ACE inhibitors develop a cough.  · Beta-blockers help lower blood pressure and slow your heart rate. This lessens the work your heart has to do. Beta-blockers may improve the hearts pumping action and strength over time. If you have severe pulmonary disease, you may not be able to take these medicines.  · Diuretics (water pills) help the body get rid of  excess water by excreting salt. This helps prevent swelling, especially in your ankles. They can also help you breath better if you have fluid in your lungs. Having less fluid to pump means your heart doesnt have to work as hard. A side effect of this medicine is having to urinate more often. Some diuretics make your body lose a mineral called potassium. Your doctor will tell you if you need to take supplements or eat more foods high in potassium.  · Digoxin helps your heart pump with more strength. This helps your heart pump more blood with each beat. So, more oxygen-rich blood travels to the rest of the body.  · Aldosterone blockers help alter hormones and decrease strain on the heart.  · Hydralazine and nitrates are two separate medications used together to treat heart failure. They may come in one combination pill. They lower blood pressure and decrease how hard the heart has to pump.  Tips for taking your medicine  · Take your medications exactly as directed. Follow the directions on the label.  · Take your medications at the same time or times each day.  · If you miss a dose, take it as soon as you remember -- unless its almost time for your next dose. If so, skip the missed dose. Do not take a double dose.  · Never change the dose or stop taking a medication unless your doctor tells you. Please tell your doctor if you don't understand how to take your medications, or if you are having difficulty getting your medications.  · If you miss too many doses, you are at risk for being admitted to the hospital for shortness of breath and worsening of heart failure symptoms.  Date Last Reviewed: 4/1/2016  © 5098-5570 [employer]. [address] [place] [address], [place] [address]. All rights reserved. This information is not intended as a substitute for professional medical care. Always follow your healthcare professional's instructions.

## 2017-04-24 NOTE — ED PROVIDER NOTES
"Encounter Date: 4/23/2017       History     Chief Complaint   Patient presents with    Panic Attack     Pt states "I feel like I am getting anxious or short of breath. It started two hours ago. I chelsie to [facility] last night and they told me everything was fine."     Shortness of Breath     Review of patient's allergies indicates:   Allergen Reactions    Sulfa (sulfonamide antibiotics) Hives     HPI Comments: Patient states that he has been having shortness of breath throughout the day.  He went to the emergency department at [facility] last night for the same complaint.  A full workup was done there which did not reveal any abnormalities.  He did have a slight elevation of his BNP which is chronic.  On initial evaluation of the patient he does state that he is feeling somewhat better.    The history is provided by the patient.     Past Medical History:   Diagnosis Date    Chronic combined systolic and diastolic congestive heart failure     Coronary artery disease     Diabetes mellitus, type II     Hyperlipidemia     Hypertension     Primary cardiomyopathy     Valvular regurgitation     m/s MR    Ventricular tachycardia      Past Surgical History:   Procedure Laterality Date    CARDIAC DEFIBRILLATOR PLACEMENT  10/2/2008; 1/5/2015    [employer]; converted to biventricular ICD VIVA QUAD XT CRT-D [id number]: Serial No. [id number]    CARDIAC DEFIBRILLATOR PLACEMENT      CORONARY ANGIOPLASTY WITH STENT PLACEMENT  4/15/2008; 10/16/2014    LAD; RCA     Family History   Problem Relation Age of Onset    Diabetes Maternal Aunt      Social History   Substance Use Topics    Smoking status: Never Smoker    Smokeless tobacco: Never Used    Alcohol use No     Review of Systems   Respiratory: Positive for shortness of breath.    Cardiovascular: Negative for leg swelling.   All other systems reviewed and are negative.      Physical Exam   Initial Vitals   BP Pulse Resp Temp SpO2   04/23/17 1932 04/23/17 1932 04/23/17 1932 " 04/23/17 1932 04/23/17 1932   105/74 84 22 97.8 °F (36.6 °C) 100 %     Physical Exam    Nursing note and vitals reviewed.  Constitutional: He appears well-developed and well-nourished.   HENT:   Head: Normocephalic and atraumatic.   Eyes: EOM are normal.   Neck: Normal range of motion. Neck supple.   Cardiovascular: Normal rate, regular rhythm, normal heart sounds and intact distal pulses.   Pulmonary/Chest: He has rales in the right lower field and the left lower field.   Abdominal: Soft.   Musculoskeletal: Normal range of motion.   Neurological: He is alert and oriented to person, place, and time.   Skin: Skin is warm and dry.   Psychiatric: He has a normal mood and affect. His behavior is normal. Judgment and thought content normal.         ED Course   Procedures  Labs Reviewed - No data to display                            ED Course     Clinical Impression:   The primary encounter diagnosis was Chronic congestive heart failure, unspecified congestive heart failure type. A diagnosis of SOB (shortness of breath) was also pertinent to this visit.    Disposition:   Disposition: Discharged  Condition: Stable       Lyn Mead MD  04/23/17 2014

## 2017-04-24 NOTE — ED TRIAGE NOTES
"Pt states "I feel like I am getting anxious or short of breath. It started two hours ago. I chelsie to [facility] last night and they told me everything was fine." Pt placed on cardiac monitor, BP cuff and pulse ox. SpO2 100% on RA.   "

## 2017-04-24 NOTE — DISCHARGE INSTRUCTIONS
Left- or Right- Side Congestive Heart Failure    The heart is a large muscle. It is a pump that circulates blood throughout the body. Blood carries oxygen to all of the organs, including the brain, muscles, and skin. After your body takes the oxygen out of the blood, the blood returns to the heart. The right side of the heart collects the blood from the body and pumps it to the lungs. In the lungs, it gets fresh oxygen and gives up carbon dioxide. The oxygen-rich blood from the lungs then returns to the left side of the heart, where it is pumped back out to the rest of your body, starting the process all over.  Congestive heart failure (CHF) occurs when the heart muscle is weakened. This affects the pumping action of the heart. Heart failure can affect the right side of the heart or the left side. But heart failure may affect not only the right side of the heart or only the left side. Although it may have started on one side, it can and often eventually does affect both sides.  Right-side heart failure  When the right side of the heart is weakened, it cant handle the blood it is getting from the rest of the body. This blood returns to the heart through veins. When too much pressure builds up in the veins, fluid leaks out into the tissues. Gravity then causes that fluid to move to those parts of the body that are the lowest. So one of the first symptoms of right-side CHF can include swelling in the feet and ankles. If the condition gets worse, the swelling can even go up past the knees. Sometimes it gets so severe, the liver can get congested as well.  Left-side heart failure  When the left side of the heart is weakened, it cant handle the blood it gets from the lungs. Pressure then builds up in the veins of the lungs, causing fluid to leak into the lung tissues. This may cause CHF and pulmonary edema. This causes you to feel short of breath, weak, or dizzy. These symptoms are often worse with exertion, such as  when climbing stairs or walking up hills. Lying with your head flat is uncomfortable and can make your breathing worse. This may make sleeping difficult. You may need to use extra pillows to elevate your upper body to sleep well. The same is true when just resting during the daytime.  There are many causes of heart failure including:  · Coronary artery disease  · Past heart attack (also known as acute myocardial infarction, or AMI)  · High blood pressure  · Damaged heart valve  · Diabetes  · Obesity  · Cigarette smoking  · Alcohol abuse  Heart failure is a chronic condition. There is no cure. The purpose of medical treatment is to improve the pumping action of the heart. The main way to do this is to remove excess water from the body. A number of medicines can help reach this goal, improve symptoms, and prevent the heart from becoming weaker. Sometimes, heart failure can become so severe that a device is placed in the heart to help with pumping. Another major goal is to better treat the causes of heart failure, such as diabetes and high blood pressure, by making changes in your lifestyle and maximizing medical control when needed.  Home care  Follow these guidelines when caring for yourself at home:  · Check your weight every day. This is very important because a sudden increase in weight gain could mean worsening heart failure. Keep these things in mind:  ¨ Use the same scale every day.  ¨ Weigh yourself at the same time every day.  ¨ Make sure the scale is on a hard floor surface, not on a rug or carpet.  ¨ Keep a record of your weight every day so your healthcare provider can see it. If you are not given a log sheet for this, keep a separate journal for this purpose.   · Cut back on the amount of salt (sodium) you eat. Follow your healthcare provider's recommendation on how much salt or sodium you should have each day.  ¨ Avoid high-salt foods. These include olives, pickles, smoked meats, salted potato chips, and  most prepared foods.  ¨ Don't add salt to your food at the table. Use only small amounts of salt when cooking.  ¨ Read the labels carefully on food packages to learn how much salt or sodium is in each serving in the package. Remember, a can or package of food may contain more than 1 serving. So if you eat all the food in the package, you may be getting more salt than you think.  · Follow your healthcare provider's recommendations about how much fluid you should have. Be aware that some foods, such as soup, pudding, and juicy fruits like oranges or melons, contain liquid. You'll need to count the liquid in those foods as part of your daily fluid intake. Your provider can help you with this.  · Stop smoking.  · Cut back on how much alcohol you drink.  · Lose weight if you are overweight. The excess weight adds a lot of stress on the workload of the heart.  · Stay active. Talk with your provider about an exercise program that is safe for your heart.  · Keep your feet elevated to reduce swelling. Ask your provider about support hose as a preventive treatment for daytime leg swelling.  Besides taking your medicine as instructed, an important part of treatment is lifestyle changes. These include diet, physical activity, stopping smoking, and weight control.  Improve your diet by including more fresh foods, cutting back on how much sugar and saturated fat you eat, and eating fewer processed foods and less salt.  Follow-up care  Follow up with your healthcare provider, or as advised.  Make sure to keep any appointments that were made for you. These can help better control your congestive heart failure. You will need to follow up with your provider on a routine basis to make sure your heart failure is well managed.  If an X-ray, ECG, or other tests were done, you will be told of any new findings that may affect your care.  Call 911  Call 911 if you:  · Become severely short of breath  · Feel lightheaded, or feel like you  might pass out or faint  · Have chest pain or discomfort that is different than usual, the medicines your doctor told you to use for this don't help, or the pain lasts longer than 10 to 15 minutes  · You suddenly develop a rapid heart rate  When to seek medical advice  The following may be signs that your heart failure is getting worse. Call your healthcare provider right away if any of these happen:  · Sudden weight gain. This means 3 or more pounds in one day, or 5 or more pounds in 1 week.  · Trouble breathing not related to being active  · New or increased swelling of your legs or ankles  · Swelling or pain in your abdomen  · Breathing trouble at night. This means waking up short of breath or needing more pillows to breathe.  · Frequent coughing that doesnt go away  · Feeling much more tired than usual  Date Last Reviewed: 1/4/2016  © 5084-6415 [employer]. [address] [place] [address]. All rights reserved. This information is not intended as a substitute for professional medical care. Always follow your healthcare professional's instructions.          Coping with Heart Failure  Its normal to feel sad or down at times when youre living with heart failure. Some medicines can also affect your mood. Following your treatment plan may seem difficult at times. If you feel overwhelmed, just focus on one day at a time. Dont be afraid to ask others for help when you need it.    Ways to feel better  Try not to withdraw from family and friends, even if you are finding it hard to talk to them. They can still be a good source of support. To feel better, you can also:  · Spend time doing things you enjoy. This may include participating in a favorite hobby, meditating, praying, or spending time with people you care about. Find activities that make you happy and make those a priority.  · Share what you learn about heart failure with the people in your life. Invite family members along when you  visit your healthcare provider. This will help you feel supported as well as help you discuss the care plan you've agreed upon with your doctor.  · Think about joining a support group for people with heart failure. It may be easier to talk to people who know firsthand what youre going through. They can offer advice and share stories. You may want to ask loved ones to join you for a meeting.  Asking for help  Having heart failure doesnt mean that you have to feel bad all the time. Consider talking to your healthcare provider or a therapist if:  · You feel worthless or helpless, or are thinking about suicide. These are warning signs of depression. Treatment can help you feel better. When depression is under control, your overall health may also improve.  · You feel anxious about what will happen to your loved ones if your health gets worse. Taking care of legal arrangements, such as a living will and durable power of , can help you feel more secure about the future.  · Social support helps alleviate stress and helps your stick with your healthy lifestyle changes. Without social support, you may end up back in the hospital.  Date Last Reviewed: 3/20/2016  © 4128-2217 [employer]. [address] [place] [address], [place] [address]. All rights reserved. This information is not intended as a substitute for professional medical care. Always follow your healthcare professional's instructions.

## 2017-04-27 ENCOUNTER — HOSPITAL ENCOUNTER (EMERGENCY)
Facility: HOSPITAL | Age: 77
Discharge: HOME | End: 2017-04-27
Attending: EMERGENCY MEDICINE
Payer: MEDICARE

## 2017-04-27 VITALS
DIASTOLIC BLOOD PRESSURE: 73 MMHG | OXYGEN SATURATION: 98 % | SYSTOLIC BLOOD PRESSURE: 96 MMHG | HEART RATE: 82 BPM | HEIGHT: 68 IN | BODY MASS INDEX: 21.82 KG/M2 | RESPIRATION RATE: 20 BRPM | WEIGHT: 144 LBS

## 2017-04-27 DIAGNOSIS — R06.02 SOB (SHORTNESS OF BREATH): ICD-10-CM

## 2017-04-27 DIAGNOSIS — R14.1 ABDOMINAL GAS PAIN: Primary | ICD-10-CM

## 2017-04-27 PROCEDURE — 99283 EMERGENCY DEPT VISIT LOW MDM: CPT

## 2017-04-27 NOTE — ED AVS SNAPSHOT
[facility]  [address] [place] [place] [address]               Trever Claros   2017 10:40 PM   ED    Description:  Male : 1940   Department:  [facility]           Your Care was Coordinated By:     Provider Role From To    Lyn Mead MD Attending Provider 17 2361 --      Reason for Visit     Bloated           Diagnoses this Visit        Comments    Abdominal gas pain    -  Primary     SOB (shortness of breath)           ED Disposition     None           To Do List           Follow-up Information     Follow up with Wiliam Mtz MD In 1 week.    Specialty:  Family Medicine    Why:  For further care    Contact information:    [address] [facility] [address]  [phone]        [facility]     [facility] Care Line -  Assistance  Unless otherwise directed by your provider, please contact [facility], our nurse care line that is available for  assistance.     Registered nurses in the [facility] provide: appointment scheduling, clinical advisement, health education, and other advisory services.  Call: [phone] (toll free)               Medications           Message regarding Medications     Verify the changes and/or additions to your medication regime listed below are the same as discussed with your clinician today.  If any of these changes or additions are incorrect, please notify your healthcare provider.             Verify that the below list of medications is an accurate representation of the medications you are currently taking.  If none reported, the list may be blank. If incorrect, please contact your healthcare provider. Carry this list with you in case of emergency.           Current Medications     allopurinol (ZYLOPRIM) 100 MG tablet Take 100 mg by mouth once daily.    aspirin (ECOTRIN) 81 MG EC tablet Take 81 mg by mouth once daily.    atorvastatin (LIPITOR) 20 MG tablet Take 1 tablet (20  "mg total) by mouth every evening.    benzonatate (TESSALON) 200 MG capsule Take 1 capsule (200 mg total) by mouth 4 (four) times daily as needed for Cough.    carvedilol (COREG) 12.5 MG tablet Take 1 tablet (12.5 mg total) by mouth 2 (two) times daily.    clopidogrel (PLAVIX) 75 mg tablet Take 1 tablet (75 mg total) by mouth once daily.    escitalopram oxalate (LEXAPRO) 10 MG tablet Take 1 tablet (10 mg total) by mouth once daily. For depression    finasteride (PROSCAR) 5 mg tablet Take 5 mg by mouth once daily.    fosinopril (MONOPRIL) 20 MG tablet Take 1 tablet (20 mg total) by mouth once daily.    furosemide (LASIX) 20 MG tablet Take 1 tablet (20 mg) every Sunday, Monday, Wednesday, Friday    furosemide (LASIX) 20 MG tablet Take 1 tablet (20 mg total) by mouth once daily.    guaifenesin-codeine 100-10 mg/5 ml (CHERATUSSIN AC)  mg/5 mL syrup Take 10 mLs by mouth every 4 (four) hours as needed for Cough.    isosorbide mononitrate (IMDUR) 60 MG 24 hr tablet Take 1 tablet (60 mg total) by mouth once daily.    nitroGLYCERIN (NITROSTAT) 0.4 MG SL tablet Place 1 tablet (0.4 mg total) under the tongue every 5 (five) minutes as needed for Chest pain (maximum 3 doses in 24 hours).    polyethylene glycol (GLYCOLAX) 17 gram/dose powder TAKE 17 GRAMS (1 CAPFUL) BY MOUTH NIGHTLY.    ranitidine (ZANTAC) 300 MG capsule Take 1 capsule (300 mg total) by mouth every evening.    saw palmetto 500 MG capsule Take 450 mg by mouth once daily.     tamsulosin (FLOMAX) 0.4 mg Cp24 Take 1 capsule by mouth 2 (two) times daily.     TRADJENTA 5 mg Tab tablet TAKE 1 TABLET BY MOUTH EACH DAY    URELLE 81-0.12 mg Tab Take 1 tablet by mouth as needed.            Clinical Reference Information           Your Vitals Were     BP Pulse Resp Height Weight SpO2    96/73 (BP Location: Right arm, Patient Position: Sitting) 82 20 5' 8" (1.727 m) 65.3 kg (144 lb) 98%    BMI                21.9 kg/m2          Allergies as of 4/27/2017        Reactions "    Sulfa (Sulfonamide Antibiotics) Hives      Immunizations Administered on Date of Encounter - 4/27/2017     None      ED Micro, Lab, POCT     None      ED Imaging Orders     Start Ordered       Status Ordering Provider    04/27/17 2246 04/27/17 2245  X-Ray Chest PA And Lateral  1 time imaging      Final result         Discharge Instructions         Shortness of Breath (Dyspnea)  Shortness of breath is the feeling that you can't catch your breath or get enough air. It is also known as dyspnea.  Dyspnea can be caused by many different conditions. They include:  · Acute asthma attack.  · Worsening of chronic lung diseases such as chronic bronchitis and emphysema.  · Heart failure. This is when weak heart muscle allows extra fluid to collect in the lungs.  · Panic attacks or anxiety. Fear can cause rapid breathing (hyperventilation).  · Pneumonia, or an infection in the lung tissue.  · Exposure to toxic substances, fumes, smoke, or certain medicines.  · Blood clot in the lung (pulmonary embolism). This is often from a piece of blood clot in a deep vein of the leg (deep vein thrombosis) that breaks off and travels to the lungs.  · Heart attack or heart-related chest pain (angina).  · Anemia.  · Collapsed lung (pneumothorax).  · Dehydration.  · Pregnancy.  Based on your visit today, the exact cause of your shortness of breath is not certain. Your tests dont show any of the serious causes of dyspnea. You may need other tests to find out if you have a serious problem. Its important to watch for any new symptoms or symptoms that get worse. Follow up with your healthcare provider as directed.  Home care  Follow these tips to take care of yourself at home:  · When your symptoms are better, go back to your usual activities.  · If you smoke, you should stop. Join a quit-smoking program or ask your healthcare provider for help.  · Eat a healthy diet and get plenty of sleep.  · Get regular exercise. Talk with your healthcare  provider before starting to exercise, especially if you have other medical problems.  · Cut down on the amount of caffeine and stimulants you consume.  Follow-up care  Follow up with your healthcare provider, or as advised.  If tests were done, you will be told if your treatment needs to be changed. You can call as directed for the results.  (Note: If an X-ray was taken, a specialist will review it. You will be notified of any new findings that may affect your care.)  Call 911 or get immediate medical care  Shortness of breath may be a sign of a serious medical problem. For example, it may be a problem with your heart or lungs. Call 911 if you have worsening shortness of breath or trouble breathing, especially with any of the symptoms below:  · You are confused or its difficult to wake you.  · You faint or lose consciousness.  · You have a fast heartbeat, or your heartbeat is irregular.  · You are coughing up blood.  · You have pain in your chest, arm, shoulder, neck, or upper back.  · You break out in a sweat.  When to seek medical advice  Call your healthcare provider right away if any of these occur:  · Slight shortness of breath or wheezing  · Redness, pain or swelling in your leg, arm, or other body area  · Swelling in both legs or ankles  · Fast weight gain  · Dizziness or weakness  · Fever of 100.4ºF (38ºC) or higher, or as directed by your healthcare provider  Date Last Reviewed: 9/13/2015 © 2000-2016 [employer]. [address] [place] [address], [place] [address]. All rights reserved. This information is not intended as a substitute for professional medical care. Always follow your healthcare professional's instructions.          Your Scheduled Appointments     Jul 10, 2017  8:20 AM CDT   Debrillator Tune Up with PACEMAKER, ICD   [facility] ([facility] )    [address]  [facility] [address] [phone]              [facility] Sign-Up     Activating your [facility]  account is as easy as 1-2-3!     1) Visit [email or web address], select Sign Up Now, enter this activation code and your date of birth, then select Next.  [email or web address]  Expires: 5/25/2017  3:25 AM      2) Create a username and password to use when you visit [facility] in the future and select a security question in case you lose your password and select Next.    3) Enter your e-mail address and click Sign Up!    Additional Information  If you have questions, please e-mail [email or web address] [email or web address] or call [phone] to talk to our [facility] staff. Remember, [facility] is NOT to be used for urgent needs. For medical emergencies, dial [facility] complies with applicable Federal civil rights laws and does not discriminate on the basis of race, color, national origin, age, disability, or sex.        Language Assistance Services     ATTENTION: Language assistance services are available, free of charge. Please call [phone].      ATENCIÓN: Si habla español, tiene a mason disposición servicios gratuitos de asistencia lingüística. Llame al [phone].     RADHA Ý: N?u b?n nói Ti?ng Vi?t, có các d?ch v? h? tr? ngôn ng? mi?n phí dành cho b?n. G?i s? [phone].

## 2017-04-28 NOTE — DISCHARGE INSTRUCTIONS

## 2017-04-28 NOTE — ED PROVIDER NOTES
Encounter Date: 4/27/2017       History     Chief Complaint   Patient presents with    Bloated     pt began feeling bloated in abdomen and chest x 1 hour. no sob but states that he just wanted to get checked out. hx of chf. no pain     Review of patient's allergies indicates:   Allergen Reactions    Sulfa (sulfonamide antibiotics) Hives     HPI Comments: Patient states that he got bloated earlier today from eating red beans.  He wasn't sure if it was his breathing or his gas pain.  But he stated he got scared and wasn't sure and decided to check in.  He states the pain is now completely gone and is back to his normal self    Patient is a 76 y.o. male presenting with the following complaint: general illness. The history is provided by the patient.   General Illness    The current episode started just prior to arrival. The problem occurs frequently. The problem has been resolved. The pain is at a severity of 0/10. Associated symptoms include abdominal pain. Pertinent negatives include no fever, no double vision, no constipation, no diarrhea, no nausea, no vomiting, no congestion, no headaches, no rhinorrhea, no stridor, no swollen glands, no muscle aches, no neck stiffness, no cough, no shortness of breath, no pain and no eye redness.     Past Medical History:   Diagnosis Date    Chronic combined systolic and diastolic congestive heart failure     Coronary artery disease     Diabetes mellitus, type II     Hyperlipidemia     Hypertension     Primary cardiomyopathy     Valvular regurgitation     m/s MR    Ventricular tachycardia      Past Surgical History:   Procedure Laterality Date    CARDIAC DEFIBRILLATOR PLACEMENT  10/2/2008; 1/5/2015    [employer]; converted to biventricular ICD VIVA QUAD XT CRT-D [id number]: Serial No. [id number]    CARDIAC DEFIBRILLATOR PLACEMENT      CORONARY ANGIOPLASTY WITH STENT PLACEMENT  4/15/2008; 10/16/2014    LAD; RCA     Family History   Problem Relation Age of Onset     Diabetes Maternal Aunt      Social History   Substance Use Topics    Smoking status: Never Smoker    Smokeless tobacco: Never Used    Alcohol use No     Review of Systems   Constitutional: Negative for fever.   HENT: Negative for congestion and rhinorrhea.    Eyes: Negative for double vision, pain and redness.   Respiratory: Negative for cough, shortness of breath and stridor.    Gastrointestinal: Positive for abdominal pain. Negative for constipation, diarrhea, nausea and vomiting.   Neurological: Negative for headaches.   All other systems reviewed and are negative.      Physical Exam   Initial Vitals   BP Pulse Resp Temp SpO2   04/27/17 2242 04/27/17 2242 04/27/17 2242 -- 04/27/17 2242   96/73 82 20  98 %     Physical Exam    Nursing note and vitals reviewed.  Constitutional: He appears well-developed and well-nourished.   HENT:   Head: Normocephalic and atraumatic.   Eyes: EOM are normal.   Neck: Normal range of motion. Neck supple.   Cardiovascular: Normal rate, regular rhythm, normal heart sounds and intact distal pulses.   Pulmonary/Chest: Breath sounds normal.   Abdominal: Soft.   Musculoskeletal: Normal range of motion.   Neurological: He is alert and oriented to person, place, and time.   Skin: Skin is warm and dry.   Psychiatric: He has a normal mood and affect. His behavior is normal. Judgment and thought content normal.         ED Course   Procedures  Labs Reviewed - No data to display       X-Rays:   Independently Interpreted Readings:   Chest X-Ray: Normal heart size.  No infiltrates.  No acute abnormalities.     Medical Decision Making:   Clinical Tests:   Radiological Study: Ordered and Reviewed                   ED Course     Clinical Impression:   The primary encounter diagnosis was Abdominal gas pain. A diagnosis of SOB (shortness of breath) was also pertinent to this visit.    Disposition:   Disposition: Discharged  Condition: Stable       Lyn Mead MD  04/27/17 2460

## 2017-05-04 ENCOUNTER — OUTPATIENT CASE MANAGEMENT (OUTPATIENT)
Dept: ADMINISTRATIVE | Facility: OTHER | Age: 77
End: 2017-05-04

## 2017-05-04 NOTE — PROGRESS NOTES
For your Information:    Please note the following patient has been assigned to Lyn Kowalski RN,  with Outpatient Complex Care Mgmt for screening.    Reason: From Outpatient referral report    Please contact [facility] at ext [phone] with questions.    Thank you    Laura Geronimo, SSC

## 2017-05-08 ENCOUNTER — OUTPATIENT CASE MANAGEMENT (OUTPATIENT)
Dept: ADMINISTRATIVE | Facility: OTHER | Age: 77
End: 2017-05-08

## 2017-05-08 NOTE — PROGRESS NOTES
Patient was referred to [facility] on 5/4/2017.  This RN received referral on 5/4/2017.  First attempt to perform initial assessment for [facility].  Left voice message to return call.  PAYAL Kowalski [facility]-RN

## 2017-05-15 ENCOUNTER — OUTPATIENT CASE MANAGEMENT (OUTPATIENT)
Dept: ADMINISTRATIVE | Facility: OTHER | Age: 77
End: 2017-05-15

## 2017-05-15 RX ORDER — CLOPIDOGREL BISULFATE 75 MG/1
TABLET ORAL
Qty: 90 TABLET | Refills: 3 | Status: ON HOLD | OUTPATIENT
Start: 2017-05-15 | End: 2017-08-19 | Stop reason: SDUPTHER

## 2017-05-15 NOTE — PROGRESS NOTES
Second attempt to perform initial assessment for [facility]; left voice message to return call.  Letter sent.  PAYAL Kowalski [facility]-RN

## 2017-05-15 NOTE — LETTER
May 15, 2017    Trever Claros  [address] [place] [address]             [facility] [address] [place] [address] Dear Trever,    I work with [facility] Out[facility] Management Department. I have been unsuccessful at reaching you to follow-up to see how you have been doing. If you require any future assistance or if any new concerns or problems arise, please do not hesitate to call.     The Outpatient Case Management Department can be reached at [phone] from 8:00AM to 4:30 PM on Monday thru Friday. [facility] also has a program where a nurse is available 24/7 to answer questions or provide medical advice, their number is [phone].    Thanks,      Lyn Kowalski,  RN  Outpatient Case Management

## 2017-05-22 DIAGNOSIS — K21.00 GASTROESOPHAGEAL REFLUX DISEASE WITH ESOPHAGITIS: ICD-10-CM

## 2017-05-23 ENCOUNTER — OUTPATIENT CASE MANAGEMENT (OUTPATIENT)
Dept: ADMINISTRATIVE | Facility: OTHER | Age: 77
End: 2017-05-23

## 2017-05-23 NOTE — PROGRESS NOTES
Talked to daughter Gayatri to perform initial assessment for OPCM.  According to Gayatri, all of patient's needs are being met and declined OPCM services.  Encouraged Gayatri to call this RN if having any questions/concerns.  Will dis-enroll at this time.  PAYAL Kowalski, OPCM-RN

## 2017-06-06 ENCOUNTER — TELEPHONE (OUTPATIENT)
Dept: FAMILY MEDICINE | Facility: CLINIC | Age: 77
End: 2017-06-06

## 2017-06-06 RX ORDER — FUROSEMIDE 20 MG/1
20 TABLET ORAL DAILY
Qty: 90 TABLET | Refills: 3 | Status: SHIPPED | OUTPATIENT
Start: 2017-06-06 | End: 2017-09-29

## 2017-06-06 NOTE — TELEPHONE ENCOUNTER
----- Message from Carolynn Gates sent at 6/6/2017 11:06 AM CDT -----  Contact: Pt walked in /[phone]  Patient states he went to the ER for SOB on 04/27/2017. Patient states he was advised that congestive heart failure was the cause. Patient states the ER doctor changed his prescription on lasix to be taken once daily instead of taking every other day. Patient requesting a prescription for Lasix 20 MG tablets Take 1 tablet daily sent to [facility] in [place]. Please advise

## 2017-06-11 RX ORDER — POLYETHYLENE GLYCOL 3350 17 G/17G
POWDER, FOR SOLUTION ORAL
Qty: 1581 G | Refills: 3 | Status: SHIPPED | OUTPATIENT
Start: 2017-06-11

## 2017-06-14 ENCOUNTER — HOSPITAL ENCOUNTER (EMERGENCY)
Facility: HOSPITAL | Age: 77
Discharge: HOME | End: 2017-06-14
Attending: EMERGENCY MEDICINE
Payer: MEDICARE

## 2017-06-14 VITALS
OXYGEN SATURATION: 99 % | TEMPERATURE: 98 F | HEART RATE: 84 BPM | RESPIRATION RATE: 18 BRPM | BODY MASS INDEX: 21.22 KG/M2 | SYSTOLIC BLOOD PRESSURE: 98 MMHG | WEIGHT: 140 LBS | HEIGHT: 68 IN | DIASTOLIC BLOOD PRESSURE: 70 MMHG

## 2017-06-14 DIAGNOSIS — R14.1 ABDOMINAL GAS PAIN: Primary | ICD-10-CM

## 2017-06-14 DIAGNOSIS — I50.9 CHF, CHRONIC: ICD-10-CM

## 2017-06-14 PROCEDURE — 99282 EMERGENCY DEPT VISIT SF MDM: CPT

## 2017-06-14 NOTE — ED TRIAGE NOTES
states like his stomach is getting bloated, and feels like his CHF symptoms will be starting soon.

## 2017-06-14 NOTE — ED PROVIDER NOTES
Encounter Date: 6/14/2017       History     Chief Complaint   Patient presents with    Abdominal Pain     states like his stomach is getting bloated, and feels like his CHF symptoms will be starting soon.     Review of patient's allergies indicates:   Allergen Reactions    Sulfa (sulfonamide antibiotics) Hives     The history is provided by the patient.   Abdominal Cramping   The primary symptoms of the illness do not include shortness of breath. Primary symptoms comment: Abdominal bloating. The current episode started just prior to arrival. The onset of the illness was gradual. The problem has not changed since onset.  The illness is associated with eating. The patient has not had a change in bowel habit. Symptoms associated with the illness do not include chills, anorexia, diaphoresis, heartburn, constipation, urgency or hematuria.     Past Medical History:   Diagnosis Date    Chronic combined systolic and diastolic congestive heart failure     Coronary artery disease     Diabetes mellitus, type II     Hyperlipidemia     Hypertension     Primary cardiomyopathy     Valvular regurgitation     m/s MR    Ventricular tachycardia      Past Surgical History:   Procedure Laterality Date    CARDIAC DEFIBRILLATOR PLACEMENT  10/2/2008; 1/5/2015    [employer]; converted to biventricular ICD VIVA QUAD XT CRT-D [id number]: Serial No. [id number]    CARDIAC DEFIBRILLATOR PLACEMENT      CORONARY ANGIOPLASTY WITH STENT PLACEMENT  4/15/2008; 10/16/2014    LAD; RCA     Family History   Problem Relation Age of Onset    Diabetes Maternal Aunt      Social History   Substance Use Topics    Smoking status: Never Smoker    Smokeless tobacco: Never Used    Alcohol use No     Review of Systems   Constitutional: Negative for chills and diaphoresis.   Respiratory: Negative for chest tightness and shortness of breath.    Cardiovascular: Negative for chest pain.   Gastrointestinal: Negative for anorexia, constipation and heartburn.    Genitourinary: Negative for hematuria and urgency.   All other systems reviewed and are negative.      Physical Exam     Initial Vitals [06/14/17 0335]   BP Pulse Resp Temp SpO2   96/73 88 20 98 °F (36.7 °C) 98 %     Physical Exam    Nursing note and vitals reviewed.  Constitutional: He appears well-developed and well-nourished.   HENT:   Head: Normocephalic and atraumatic.   Eyes: EOM are normal.   Neck: Normal range of motion. Neck supple.   Cardiovascular: Normal rate, regular rhythm, normal heart sounds and intact distal pulses.   Pulmonary/Chest: Breath sounds normal.   Abdominal: Soft.   Musculoskeletal: Normal range of motion.   Neurological: He is alert and oriented to person, place, and time.   Skin: Skin is warm and dry. Capillary refill takes less than 2 seconds.   Psychiatric: He has a normal mood and affect. His behavior is normal. Judgment and thought content normal.         ED Course   Procedures  Labs Reviewed - No data to display                            ED Course     Clinical Impression:   The encounter diagnosis was Abdominal gas pain.    Disposition:   Disposition: Discharged  Condition: Stable  Patient states that since she's been here he is feeling a lot better.  He states that he passed a significant amount of gas and is now symptom-free       Lyn Mead MD  06/14/17 6937

## 2017-06-17 ENCOUNTER — OUTPATIENT CASE MANAGEMENT (OUTPATIENT)
Dept: ADMINISTRATIVE | Facility: OTHER | Age: 77
End: 2017-06-17

## 2017-06-17 NOTE — PROGRESS NOTES
Thank you for the referral.     For your information:    The following patient has been assigned to Keyana Llanos RN  with Outpatient Complex Care Management for high risk screening.    Reason: High risk     Please contact [facility] at [phone] with any questions.    Thank you,      Laura Geronimo, SSC

## 2017-06-20 ENCOUNTER — OUTPATIENT CASE MANAGEMENT (OUTPATIENT)
Dept: ADMINISTRATIVE | Facility: OTHER | Age: 77
End: 2017-06-20

## 2017-06-20 NOTE — PROGRESS NOTES
Pts daughter Gayatri called me back to perform initial assessment for OPCM and reports the pt has no current needs at this time and refused OPCM. Encouraged Gayatri to call this RN if having any questions/concerns and will mail OPCM brochure and my business card.  Will dis-enroll at this time.  ARSEN Llanos, RN-OPCM

## 2017-06-20 NOTE — PROGRESS NOTES
Attempt to complete initial assessment for Outpatient Care Management; left message and person that answered the phone asked that I call back after 1pm today. Amari RAMIREZ-OPCM

## 2017-06-22 ENCOUNTER — HOSPITAL ENCOUNTER (EMERGENCY)
Facility: HOSPITAL | Age: 77
Discharge: HOME | End: 2017-06-22
Attending: FAMILY MEDICINE
Payer: MEDICARE

## 2017-06-22 ENCOUNTER — TELEPHONE (OUTPATIENT)
Dept: CARDIOLOGY | Facility: CLINIC | Age: 77
End: 2017-06-22

## 2017-06-22 VITALS
RESPIRATION RATE: 18 BRPM | HEART RATE: 66 BPM | SYSTOLIC BLOOD PRESSURE: 83 MMHG | BODY MASS INDEX: 21.22 KG/M2 | TEMPERATURE: 98 F | HEIGHT: 68 IN | OXYGEN SATURATION: 99 % | WEIGHT: 140 LBS | DIASTOLIC BLOOD PRESSURE: 69 MMHG

## 2017-06-22 DIAGNOSIS — R06.02 SOB (SHORTNESS OF BREATH): ICD-10-CM

## 2017-06-22 DIAGNOSIS — I50.9 CHRONIC CONGESTIVE HEART FAILURE, UNSPECIFIED CONGESTIVE HEART FAILURE TYPE: ICD-10-CM

## 2017-06-22 DIAGNOSIS — F41.9 ANXIETY: Primary | ICD-10-CM

## 2017-06-22 LAB — POCT GLUCOSE: 144 MG/DL (ref 70–110)

## 2017-06-22 PROCEDURE — 25000003 PHARM REV CODE 250: Performed by: FAMILY MEDICINE

## 2017-06-22 PROCEDURE — 82962 GLUCOSE BLOOD TEST: CPT

## 2017-06-22 PROCEDURE — 99284 EMERGENCY DEPT VISIT MOD MDM: CPT | Mod: 25

## 2017-06-22 RX ORDER — LORAZEPAM 1 MG/1
1 TABLET ORAL
Status: COMPLETED | OUTPATIENT
Start: 2017-06-22 | End: 2017-06-22

## 2017-06-22 RX ORDER — ALPRAZOLAM 0.25 MG/1
0.25 TABLET ORAL ONCE AS NEEDED
Qty: 10 TABLET | Refills: 0 | Status: SHIPPED | OUTPATIENT
Start: 2017-06-22 | End: 2017-09-07 | Stop reason: SDUPTHER

## 2017-06-22 RX ORDER — FUROSEMIDE 20 MG/1
20 TABLET ORAL
Status: DISCONTINUED | OUTPATIENT
Start: 2017-06-22 | End: 2017-06-22

## 2017-06-22 RX ADMIN — LORAZEPAM 1 MG: 1 TABLET ORAL at 04:06

## 2017-06-22 NOTE — ED PROVIDER NOTES
Encounter Date: 6/22/2017       History     Chief Complaint   Patient presents with    Shortness of Breath     I feel short of breath sometimes and its hard to catch my breath. It has been going on a few weeks it happens all the time. They told me i have anxiety but i dont take the medicine.      Patient complains of intermittent shortness of breath.  Patient also says it could be from his anxiety.  Patient is compliant to his medication and has a history of  CHF, does not complain of any chest pain today.  No fever or cough.      The history is provided by the patient.     Review of patient's allergies indicates:   Allergen Reactions    Sulfa (sulfonamide antibiotics) Hives     Past Medical History:   Diagnosis Date    Chronic combined systolic and diastolic congestive heart failure     Coronary artery disease     Diabetes mellitus, type II     Hyperlipidemia     Hypertension     Primary cardiomyopathy     Valvular regurgitation     m/s MR    Ventricular tachycardia      Past Surgical History:   Procedure Laterality Date    CARDIAC DEFIBRILLATOR PLACEMENT  10/2/2008; 1/5/2015    [employer]; converted to biventricular ICD VIVA QUAD XT CRT-D [id number]: Serial No. [id number]    CARDIAC DEFIBRILLATOR PLACEMENT      CORONARY ANGIOPLASTY WITH STENT PLACEMENT  4/15/2008; 10/16/2014    LAD; RCA     Family History   Problem Relation Age of Onset    Diabetes Maternal Aunt      Social History   Substance Use Topics    Smoking status: Never Smoker    Smokeless tobacco: Never Used    Alcohol use No     Review of Systems   Constitutional: Negative for activity change, appetite change, chills, diaphoresis, fatigue and fever.   HENT: Negative for congestion, ear pain, rhinorrhea and sore throat.    Eyes: Negative for pain, discharge, redness and itching.   Respiratory: Positive for shortness of breath.    Cardiovascular: Negative for chest pain and palpitations.   Gastrointestinal: Negative for abdominal distention,  abdominal pain, diarrhea, nausea and vomiting.   Genitourinary: Negative for difficulty urinating, dysuria and enuresis.   Musculoskeletal: Negative for back pain.   Skin: Negative for rash.   Neurological: Negative for dizziness, light-headedness and headaches.   Psychiatric/Behavioral: Negative for behavioral problems and hallucinations. The patient is not nervous/anxious.        Physical Exam     Initial Vitals [06/22/17 1531]   BP Pulse Resp Temp SpO2   (!) 92/59 77 15 98 °F (36.7 °C) 97 %      MAP       70         Physical Exam    Nursing note and vitals reviewed.  Constitutional: He appears well-developed and well-nourished.   HENT:   Head: Normocephalic.   Right Ear: External ear normal.   Left Ear: External ear normal.   Nose: Nose normal.   Mouth/Throat: Oropharynx is clear and moist.   Eyes: Conjunctivae and EOM are normal. Pupils are equal, round, and reactive to light.   Neck: Normal range of motion. Neck supple.   Cardiovascular: Normal rate, regular rhythm, normal heart sounds and intact distal pulses. Exam reveals no gallop and no friction rub.    No murmur heard.  Pulmonary/Chest: No respiratory distress. He has no rhonchi. He has rales. He exhibits no tenderness.   Abdominal: Soft. Bowel sounds are normal. He exhibits no distension. There is no tenderness. There is no guarding.   Musculoskeletal: Normal range of motion.   Neurological: He is alert and oriented to person, place, and time. He has normal strength and normal reflexes. He displays normal reflexes. No cranial nerve deficit or sensory deficit.   Skin: Skin is warm. Capillary refill takes less than 2 seconds. No rash noted.   Psychiatric: Thought content normal. His mood appears anxious.         ED Course   Procedures  Labs Reviewed   POCT GLUCOSE - Abnormal; Notable for the following:        Result Value    POCT Glucose 144 (*)     All other components within normal limits             Medical Decision Making:   ED Management:  PT HAS CHF  WITH BASE LINE SOB AND INTERMITTENT EXACERBATION. PT ALSO THINKS HE HAS ANXIETY ATTCKS CAUSING HIS INTERMITTENT SOB. TO DAY HE FEELS COMFORTABLE AFTER TAKING MEDS.   PT IS GIVE TEMP RX FOR HIS ANXIETY AND SEE HOW IT WORKS.                   ED Course     Clinical Impression:   The primary encounter diagnosis was Anxiety. Diagnoses of SOB (shortness of breath) and Chronic congestive heart failure, unspecified congestive heart failure type were also pertinent to this visit.    Disposition:   Disposition: Discharged  Condition: Shira Aguilar MD  06/23/17 1950

## 2017-06-22 NOTE — ED NOTES
"Dr. Aguilar notified pt states "I took Lasix this morning. They don't want me taking too much because of my kidneys." MD notified.   "

## 2017-06-22 NOTE — TELEPHONE ENCOUNTER
----- Message from Saurabh Hill sent at 6/22/2017  2:54 PM CDT -----  Contact: daughter/Gayatri   [phone]  Pt daughter is requesting to speak with the nurse about the pt having gas and medications he can take for it.  Please advise

## 2017-06-26 ENCOUNTER — CLINICAL SUPPORT (OUTPATIENT)
Dept: ELECTROPHYSIOLOGY | Facility: CLINIC | Age: 77
End: 2017-06-26
Payer: MEDICARE

## 2017-06-26 ENCOUNTER — TELEPHONE (OUTPATIENT)
Dept: CARDIOLOGY | Facility: CLINIC | Age: 77
End: 2017-06-26

## 2017-06-26 DIAGNOSIS — I25.5 ISCHEMIC CARDIOMYOPATHY: ICD-10-CM

## 2017-06-26 DIAGNOSIS — Z95.810 ICD (IMPLANTABLE CARDIOVERTER-DEFIBRILLATOR) IN PLACE: ICD-10-CM

## 2017-06-26 NOTE — TELEPHONE ENCOUNTER
Spoke to patients daughter.      She stated Mr. Claros is feeling much better now that he is taking Xanax.    Advised her to scheduled follow up appt with PCP for anxiety.    Per Dr. Reid's last note, patient is to resume care with Dr PAYAL Roe.

## 2017-06-26 NOTE — TELEPHONE ENCOUNTER
----- Message from Diana Beckford MA sent at 6/22/2017  3:19 PM CDT -----  Contact: Ms Claros  No appointments till September. Could you please fit in in [place]place. Thank you!    ----- Message -----  From: Sarah Castellanos LPN  Sent: 6/22/2017   3:08 PM  To: Franklin KELLER Staff        ----- Message -----  From: Celina Barrow MA  Sent: 6/22/2017   2:51 PM  To: Sarah Castellanos LPN    C/O  Gas stomach aches /  pt.  ----- Message -----  From: Silvia Aguilar  Sent: 6/22/2017   2:14 PM  To: Lucia MONTAGUE Staff    Patient went to hospital at [facility].  Pt need follow up appointment in [place] Please call Ms Chavez [phone] for more detail info

## 2017-07-05 ENCOUNTER — TELEPHONE (OUTPATIENT)
Dept: ELECTROPHYSIOLOGY | Facility: CLINIC | Age: 77
End: 2017-07-05

## 2017-07-05 NOTE — TELEPHONE ENCOUNTER
Spoke to Daughter Dinorah [phone]  who stated they will send in a remote check Monday 7-10-17.

## 2017-07-05 NOTE — TELEPHONE ENCOUNTER
Called pt, spoke to family member. Requested to cancel apt on 7/10 for ICD check in clinic since remote transmission received on 6/26/17. Family member states she will give patient the message.

## 2017-07-10 ENCOUNTER — CLINICAL SUPPORT (OUTPATIENT)
Dept: ELECTROPHYSIOLOGY | Facility: CLINIC | Age: 77
End: 2017-07-10
Payer: MEDICARE

## 2017-07-10 DIAGNOSIS — Z95.810 ICD (IMPLANTABLE CARDIOVERTER-DEFIBRILLATOR) IN PLACE: ICD-10-CM

## 2017-07-10 DIAGNOSIS — I25.5 ISCHEMIC CARDIOMYOPATHY: ICD-10-CM

## 2017-07-10 PROCEDURE — 93295 DEV INTERROG REMOTE 1/2/MLT: CPT | Mod: ,,, | Performed by: INTERNAL MEDICINE

## 2017-07-10 PROCEDURE — 93297 REM INTERROG DEV EVAL ICPMS: CPT | Mod: ,,, | Performed by: INTERNAL MEDICINE

## 2017-07-10 PROCEDURE — 93296 REM INTERROG EVL PM/IDS: CPT | Mod: PBBFAC | Performed by: INTERNAL MEDICINE

## 2017-07-17 ENCOUNTER — HOSPITAL ENCOUNTER (EMERGENCY)
Facility: HOSPITAL | Age: 77
Discharge: HOME | End: 2017-07-17
Attending: FAMILY MEDICINE
Payer: MEDICARE

## 2017-07-17 VITALS
DIASTOLIC BLOOD PRESSURE: 73 MMHG | WEIGHT: 139 LBS | HEART RATE: 71 BPM | RESPIRATION RATE: 27 BRPM | BODY MASS INDEX: 21.07 KG/M2 | OXYGEN SATURATION: 97 % | TEMPERATURE: 96 F | SYSTOLIC BLOOD PRESSURE: 101 MMHG | HEIGHT: 68 IN

## 2017-07-17 DIAGNOSIS — R06.02 SOB (SHORTNESS OF BREATH): ICD-10-CM

## 2017-07-17 DIAGNOSIS — I50.43 ACUTE ON CHRONIC COMBINED SYSTOLIC AND DIASTOLIC CONGESTIVE HEART FAILURE: Primary | ICD-10-CM

## 2017-07-17 LAB
ALBUMIN SERPL BCP-MCNC: 4 G/DL
ALP SERPL-CCNC: 170 U/L
ALT SERPL W/O P-5'-P-CCNC: 43 U/L
ANION GAP SERPL CALC-SCNC: 11 MMOL/L
APTT BLDCRRT: 27.3 SEC
AST SERPL-CCNC: 27 U/L
BASOPHILS # BLD AUTO: 0.02 K/UL
BASOPHILS NFR BLD: 0.4 %
BILIRUB SERPL-MCNC: 1.4 MG/DL
BUN SERPL-MCNC: 49 MG/DL
CALCIUM SERPL-MCNC: 9.5 MG/DL
CHLORIDE SERPL-SCNC: 109 MMOL/L
CO2 SERPL-SCNC: 22 MMOL/L
CREAT SERPL-MCNC: 1.53 MG/DL
DIFFERENTIAL METHOD: ABNORMAL
EOSINOPHIL # BLD AUTO: 0.1 K/UL
EOSINOPHIL NFR BLD: 1.5 %
ERYTHROCYTE [DISTWIDTH] IN BLOOD BY AUTOMATED COUNT: 13.6 %
EST. GFR  (AFRICAN AMERICAN): 50.3 ML/MIN/1.73 M^2
EST. GFR  (NON AFRICAN AMERICAN): 43.5 ML/MIN/1.73 M^2
GLUCOSE SERPL-MCNC: 119 MG/DL
HCT VFR BLD AUTO: 38.5 %
HGB BLD-MCNC: 12.7 G/DL
INR PPP: 1.1
LYMPHOCYTES # BLD AUTO: 1.4 K/UL
LYMPHOCYTES NFR BLD: 31.7 %
MCH RBC QN AUTO: 32.2 PG
MCHC RBC AUTO-ENTMCNC: 33 %
MCV RBC AUTO: 98 FL
MONOCYTES # BLD AUTO: 0.9 K/UL
MONOCYTES NFR BLD: 20 %
NEUTROPHILS # BLD AUTO: 2.1 K/UL
NEUTROPHILS NFR BLD: 46.2 %
NT-PROBNP: 3510 PG/ML
PLATELET # BLD AUTO: 132 K/UL
PMV BLD AUTO: 11.6 FL
POTASSIUM SERPL-SCNC: 4.7 MMOL/L
PROT SERPL-MCNC: 7.3 G/DL
PROTHROMBIN TIME: 12.4 SEC
RBC # BLD AUTO: 3.94 M/UL
SODIUM SERPL-SCNC: 142 MMOL/L
TROPONIN I SERPL DL<=0.01 NG/ML-MCNC: 0.03 NG/ML
WBC # BLD AUTO: 4.54 K/UL

## 2017-07-17 PROCEDURE — 84484 ASSAY OF TROPONIN QUANT: CPT

## 2017-07-17 PROCEDURE — 63600175 PHARM REV CODE 636 W HCPCS: Performed by: FAMILY MEDICINE

## 2017-07-17 PROCEDURE — 83880 ASSAY OF NATRIURETIC PEPTIDE: CPT

## 2017-07-17 PROCEDURE — 85025 COMPLETE CBC W/AUTO DIFF WBC: CPT

## 2017-07-17 PROCEDURE — 93005 ELECTROCARDIOGRAM TRACING: CPT

## 2017-07-17 PROCEDURE — 80053 COMPREHEN METABOLIC PANEL: CPT

## 2017-07-17 PROCEDURE — 85730 THROMBOPLASTIN TIME PARTIAL: CPT

## 2017-07-17 PROCEDURE — 99284 EMERGENCY DEPT VISIT MOD MDM: CPT | Mod: 25

## 2017-07-17 PROCEDURE — 93010 ELECTROCARDIOGRAM REPORT: CPT | Mod: ,,, | Performed by: INTERNAL MEDICINE

## 2017-07-17 PROCEDURE — 85610 PROTHROMBIN TIME: CPT

## 2017-07-17 RX ORDER — LOSARTAN POTASSIUM 50 MG/1
50 TABLET ORAL DAILY
Status: ON HOLD | COMMUNITY
End: 2017-08-19

## 2017-07-17 RX ORDER — FUROSEMIDE 10 MG/ML
40 INJECTION INTRAMUSCULAR; INTRAVENOUS
Status: COMPLETED | OUTPATIENT
Start: 2017-07-17 | End: 2017-07-17

## 2017-07-17 RX ADMIN — FUROSEMIDE 40 MG: 10 INJECTION, SOLUTION INTRAMUSCULAR; INTRAVENOUS at 07:07

## 2017-07-17 NOTE — ED PROVIDER NOTES
"Encounter Date: 7/17/2017       History     Chief Complaint   Patient presents with    Shortness of Breath     "Im a chf patient, I can feel it coming on" Pt reports SOB x 3 hrs      Patient has history of CHF and is on Lasix.  Recently his Lasix has been changed to every other day.  Patient was feeling weak on Lasix so he was unable to tolerate on a daily basis.  Patient did not take his Lasix yesterday.  He started feeling short of breath all last night.  Patient woke up this morning and continued to have shortness of breath.  He took Lasix 20 mg by mouth 2 pills prior to arrival to the ED.  On arrival to ED if patient is hyperventilating feels anxious and says that his CHF is coming on him and he wants immediately IV Lasix.  Patient is maintaining oxygen saturation and speaking full sentences.  He denies any chest pain or cough.      The history is provided by the patient.     Review of patient's allergies indicates:   Allergen Reactions    Sulfa (sulfonamide antibiotics) Hives     Past Medical History:   Diagnosis Date    Chronic combined systolic and diastolic congestive heart failure     Coronary artery disease     Diabetes mellitus, type II     Hyperlipidemia     Hypertension     Primary cardiomyopathy     Valvular regurgitation     m/s MR    Ventricular tachycardia      Past Surgical History:   Procedure Laterality Date    CARDIAC DEFIBRILLATOR PLACEMENT  10/2/2008; 1/5/2015    [employer]; converted to biventricular ICD VIVA QUAD XT CRT-D [id number]: Serial No. [id number]    CARDIAC DEFIBRILLATOR PLACEMENT      CORONARY ANGIOPLASTY WITH STENT PLACEMENT  4/15/2008; 10/16/2014    LAD; RCA     Family History   Problem Relation Age of Onset    Diabetes Maternal Aunt      Social History   Substance Use Topics    Smoking status: Never Smoker    Smokeless tobacco: Never Used    Alcohol use No     Review of Systems   Constitutional: Negative for activity change, appetite change, chills, diaphoresis, " fatigue and fever.   HENT: Negative for congestion, ear pain and sore throat.    Eyes: Negative for pain, discharge and itching.   Respiratory: Positive for shortness of breath. Negative for cough, chest tightness and wheezing.    Cardiovascular: Negative for chest pain and palpitations.   Gastrointestinal: Negative for abdominal distention, abdominal pain, diarrhea and vomiting.   Genitourinary: Negative for dysuria and frequency.   Musculoskeletal: Negative for gait problem, neck pain and neck stiffness.   Skin: Negative for rash.   Neurological: Negative for dizziness, light-headedness and headaches.       Physical Exam     Initial Vitals [07/17/17 0701]   BP Pulse Resp Temp SpO2   (!) 119/92 104 (!) 44 96.2 °F (35.7 °C) 97 %      MAP       101         Physical Exam    Nursing note and vitals reviewed.  Constitutional: He appears well-developed and well-nourished.   HENT:   Head: Normocephalic.   Nose: Nose normal.   Mouth/Throat: Oropharynx is clear and moist.   Eyes: Pupils are equal, round, and reactive to light.   Neck: Normal range of motion.   Cardiovascular: Normal rate and intact distal pulses.   Murmur heard.  Pulmonary/Chest: No respiratory distress. He has decreased breath sounds in the right lower field and the left lower field. He has no wheezes. He has rales in the right lower field and the left lower field.   Patient has mild-to-moderate Rales in his bilateral lower lung fields.  He has good air entry and expansion in upper lung fields.  Patient is hyperventilating and seems to be more anxious.   Abdominal: Soft. He exhibits no distension. There is no tenderness.   Musculoskeletal: Normal range of motion.   Neurological: He is alert and oriented to person, place, and time.   Skin: Skin is warm. Capillary refill takes less than 2 seconds.         ED Course   Procedures  Labs Reviewed   NT-PRO NATRIURETIC PEPTIDE   CBC W/ AUTO DIFFERENTIAL   COMPREHENSIVE METABOLIC PANEL   TROPONIN I   PROTIME-INR    APTT     EKG Readings: (Independently Interpreted)   Rhythm: Paced Rhythm. Heart Rate: 93.          Medical Decision Making:   ED Management:  Patient was given Lasix 40 mg IV.  Patient diuresed about 300 mL of urine.  Patient says he felt much better since he diuresed.  His shortness of breath to his baseline.  No chest pain.  PT IS ADVISED TO CONTINUE LASIX - LIMIT FLUIDS, F/U PCP.                   ED Course     Clinical Impression:   The primary encounter diagnosis was Acute on chronic combined systolic and diastolic congestive heart failure. A diagnosis of SOB (shortness of breath) was also pertinent to this visit.    Disposition:   Disposition: Discharged  Condition: Fair                        Rajendra Aguilar MD  07/21/17 0133

## 2017-08-08 ENCOUNTER — HOSPITAL ENCOUNTER (EMERGENCY)
Facility: HOSPITAL | Age: 77
Discharge: HOME | End: 2017-08-08
Attending: FAMILY MEDICINE
Payer: MEDICARE

## 2017-08-08 VITALS
HEIGHT: 68 IN | RESPIRATION RATE: 26 BRPM | SYSTOLIC BLOOD PRESSURE: 96 MMHG | BODY MASS INDEX: 21.22 KG/M2 | TEMPERATURE: 98 F | HEART RATE: 81 BPM | DIASTOLIC BLOOD PRESSURE: 68 MMHG | OXYGEN SATURATION: 99 % | WEIGHT: 140 LBS

## 2017-08-08 DIAGNOSIS — R06.02 SHORTNESS OF BREATH: ICD-10-CM

## 2017-08-08 DIAGNOSIS — I50.43 ACUTE ON CHRONIC COMBINED SYSTOLIC AND DIASTOLIC CONGESTIVE HEART FAILURE: Primary | ICD-10-CM

## 2017-08-08 LAB
BUN BLD-MCNC: 52 MG/DL
CA-I BLDV-SCNC: 1.19 MMOL/L
CHLORIDE BLD-SCNC: 107 MMOL/L
CO2 BLD-SCNC: 22 MMOL/L
CREATININE: 1.9 MG/DL
GLUCOSE BLD-MCNC: 141 MG/DL
POTASSIUM BLD-SCNC: 4.5 MMOL/L
SODIUM BLD-SCNC: 140 MMOL/L
TROPONIN ISTAT: 0.04 NG/ML

## 2017-08-08 PROCEDURE — 80047 BASIC METABLC PNL IONIZED CA: CPT

## 2017-08-08 PROCEDURE — 94760 N-INVAS EAR/PLS OXIMETRY 1: CPT

## 2017-08-08 PROCEDURE — 99284 EMERGENCY DEPT VISIT MOD MDM: CPT | Mod: 25

## 2017-08-08 PROCEDURE — 93005 ELECTROCARDIOGRAM TRACING: CPT

## 2017-08-08 PROCEDURE — 96374 THER/PROPH/DIAG INJ IV PUSH: CPT

## 2017-08-08 PROCEDURE — 63600175 PHARM REV CODE 636 W HCPCS: Performed by: FAMILY MEDICINE

## 2017-08-08 PROCEDURE — 27000221 HC OXYGEN, UP TO 24 HOURS

## 2017-08-08 PROCEDURE — 93010 ELECTROCARDIOGRAM REPORT: CPT | Mod: ,,, | Performed by: INTERNAL MEDICINE

## 2017-08-08 PROCEDURE — 84484 ASSAY OF TROPONIN QUANT: CPT

## 2017-08-08 RX ORDER — FUROSEMIDE 10 MG/ML
40 INJECTION INTRAMUSCULAR; INTRAVENOUS
Status: COMPLETED | OUTPATIENT
Start: 2017-08-08 | End: 2017-08-08

## 2017-08-08 RX ADMIN — FUROSEMIDE 40 MG: 10 INJECTION, SOLUTION INTRAMUSCULAR; INTRAVENOUS at 08:08

## 2017-08-08 NOTE — ED PROVIDER NOTES
Encounter Date: 8/8/2017       History     Chief Complaint   Patient presents with    Shortness of Breath     SOB that started a couple hours ago after he felt a crackle in his chest.     Patient complains of shortness of breath since last few hours.  He is a known CHF patient who has very low threshold for minimal fluid overload.  He took 2 Lasix pills at home before coming in.  Patient says his shortness of breath that did not get better so he came to the ER.  On arrival he had minimal to moderate short of breath.  Normal oxygenation.  Denies any chest pain.  No wheezing.      The history is provided by the patient.     Review of patient's allergies indicates:   Allergen Reactions    Sulfa (sulfonamide antibiotics) Hives     Past Medical History:   Diagnosis Date    Chronic combined systolic and diastolic congestive heart failure     Coronary artery disease     Diabetes mellitus, type II     Hyperlipidemia     Hypertension     Primary cardiomyopathy     Valvular regurgitation     m/s MR    Ventricular tachycardia      Past Surgical History:   Procedure Laterality Date    CARDIAC DEFIBRILLATOR PLACEMENT  10/2/2008; 1/5/2015    [employer]; converted to biventricular ICD VIVA QUAD XT CRT-D [id number]: Serial No. [id number]    CARDIAC DEFIBRILLATOR PLACEMENT      CORONARY ANGIOPLASTY WITH STENT PLACEMENT  4/15/2008; 10/16/2014    LAD; RCA     Family History   Problem Relation Age of Onset    Diabetes Maternal Aunt      Social History   Substance Use Topics    Smoking status: Never Smoker    Smokeless tobacco: Never Used    Alcohol use No     Review of Systems   Constitutional: Negative for activity change, appetite change and fever.   HENT: Negative for congestion, rhinorrhea and sore throat.    Eyes: Negative for pain, discharge, redness and itching.   Respiratory: Positive for shortness of breath. Negative for cough, chest tightness and wheezing.    Cardiovascular: Negative for chest pain and  palpitations.   Gastrointestinal: Negative for abdominal pain, diarrhea, nausea and vomiting.   Genitourinary: Negative for dysuria, flank pain and frequency.   Musculoskeletal: Negative for back pain, neck pain and neck stiffness.   Skin: Negative for rash.   Neurological: Negative for dizziness, light-headedness and headaches.   Psychiatric/Behavioral: Negative for confusion. The patient is not nervous/anxious.        Physical Exam     Initial Vitals [08/08/17 0809]   BP Pulse Resp Temp SpO2   108/77 87 (!) 36 97.8 °F (36.6 °C) 96 %      MAP       87.33         Physical Exam    Vitals reviewed.  Constitutional: He appears well-developed and well-nourished.   HENT:   Head: Normocephalic.   Right Ear: External ear normal.   Left Ear: External ear normal.   Nose: Nose normal.   Mouth/Throat: Oropharynx is clear and moist.   Eyes: Conjunctivae and EOM are normal. Pupils are equal, round, and reactive to light.   Neck: Normal range of motion.   Cardiovascular: Normal rate, regular rhythm, normal heart sounds and intact distal pulses.   No murmur heard.  Pulmonary/Chest: No respiratory distress. He has no wheezes. He has no rhonchi. He has rales. He exhibits no tenderness.   Abdominal: Soft. Bowel sounds are normal. He exhibits no distension. There is no tenderness.   Musculoskeletal: Normal range of motion.   Neurological: He is alert and oriented to person, place, and time. He has normal reflexes. He displays normal reflexes. No cranial nerve deficit or sensory deficit.   Skin: Skin is warm. Capillary refill takes less than 2 seconds. No rash noted. No erythema.         ED Course   Procedures  Labs Reviewed   TROPONIN I   BASIC METABOLIC PANEL     EKG Readings: (Independently Interpreted)   Rhythm: Normal Sinus Rhythm. Ectopy: PVCs. Clinical Impression: Non-specific ST Depression          Medical Decision Making:   ED Management:  Patient has a very low threshold for fluid overload.  Whenever his senses fluid overload  he takes an extra pill of his Lasix.  And comes to the ER for evaluation.  On arrival he was short of breath and had bilateral crackles in his chest on examination.  His oxygenation was normal.  Patient was given IV Lasix and he diuresed.  An hour later patient felt much better to his baseline.  Patient felt safe to go home.  Advised to follow-up ER if symptoms persist or worsen.  Patient had no more questions and understands CHF management.                   ED Course     Clinical Impression:   The primary encounter diagnosis was Acute on chronic combined systolic and diastolic congestive heart failure. A diagnosis of Shortness of breath was also pertinent to this visit.    Disposition:   Disposition: Discharged  Condition: Shira Aguilar MD  08/08/17 1016

## 2017-08-09 ENCOUNTER — HOSPITAL ENCOUNTER (EMERGENCY)
Facility: HOSPITAL | Age: 77
Discharge: HOME | End: 2017-08-09
Attending: EMERGENCY MEDICINE
Payer: MEDICARE

## 2017-08-09 ENCOUNTER — OUTPATIENT CASE MANAGEMENT (OUTPATIENT)
Dept: ADMINISTRATIVE | Facility: OTHER | Age: 77
End: 2017-08-09

## 2017-08-09 VITALS
DIASTOLIC BLOOD PRESSURE: 63 MMHG | TEMPERATURE: 98 F | RESPIRATION RATE: 16 BRPM | HEART RATE: 75 BPM | OXYGEN SATURATION: 99 % | SYSTOLIC BLOOD PRESSURE: 93 MMHG

## 2017-08-09 DIAGNOSIS — S40.011A CONTUSION OF SHOULDER, RIGHT: Primary | ICD-10-CM

## 2017-08-09 DIAGNOSIS — R52 PAIN: ICD-10-CM

## 2017-08-09 PROCEDURE — 99283 EMERGENCY DEPT VISIT LOW MDM: CPT

## 2017-08-09 RX ORDER — IBUPROFEN 600 MG/1
600 TABLET ORAL EVERY 6 HOURS PRN
Qty: 20 TABLET | Refills: 0 | Status: SHIPPED | OUTPATIENT
Start: 2017-08-09 | End: 2017-09-07

## 2017-08-09 NOTE — PROGRESS NOTES
For your information:    The following patient has been assigned to Keyana Llanos RN with Outpatient Complex Care Management for high risk screening.    Reason: High Risk    Please contact [facility] at [phone] with any questions.    Thank you,  Ivette Mauricio

## 2017-08-10 NOTE — ED PROVIDER NOTES
Encounter Date: 8/9/2017       History     Chief Complaint   Patient presents with    Shoulder Pain     right shoulder pain this evening . wife fell on him while walking up steps.     Patient was lifting his wife who is disabled and she fell backwards onto him.  Patient states that he hit his back and right shoulder.  Is complaining of pain in the right shoulder.      The history is provided by the patient.   Arm Injury    The incident occurred today. The incident occurred at home. The injury mechanism was a direct blow. The wounds were not self-inflicted. No protective equipment was used. He came to the ER via by private vehicle. There is an injury to the right shoulder. There have been no prior injuries to these areas. He is right-handed. His tetanus status is UTD. He has been behaving normally. Pertinent negatives include no chest pain, no altered mental status, no numbness, no visual disturbance, no abdominal pain, no bowel incontinence, no nausea, no vomiting, no bladder incontinence, no headaches, no hearing loss, no focal weakness, no decreased responsiveness, no light-headedness, no loss of consciousness and no cough.     Review of patient's allergies indicates:   Allergen Reactions    Sulfa (sulfonamide antibiotics) Hives     Past Medical History:   Diagnosis Date    Chronic combined systolic and diastolic congestive heart failure     Coronary artery disease     Diabetes mellitus, type II     Hyperlipidemia     Hypertension     Primary cardiomyopathy     Valvular regurgitation     m/s MR    Ventricular tachycardia      Past Surgical History:   Procedure Laterality Date    CARDIAC DEFIBRILLATOR PLACEMENT  10/2/2008; 1/5/2015    [employer]; converted to biventricular ICD VIVA QUAD XT CRT-D [id number]: Serial No. [id number]    CARDIAC DEFIBRILLATOR PLACEMENT      CORONARY ANGIOPLASTY WITH STENT PLACEMENT  4/15/2008; 10/16/2014    LAD; RCA     Family History   Problem Relation Age of Onset    Diabetes  Maternal Aunt      Social History   Substance Use Topics    Smoking status: Never Smoker    Smokeless tobacco: Never Used    Alcohol use No     Review of Systems   Constitutional: Negative for decreased responsiveness.   HENT: Negative for hearing loss.    Eyes: Negative for visual disturbance.   Respiratory: Negative for cough.    Cardiovascular: Negative for chest pain.   Gastrointestinal: Negative for abdominal pain, bowel incontinence, nausea and vomiting.   Genitourinary: Negative for bladder incontinence.   Musculoskeletal: Positive for arthralgias.   Neurological: Negative for focal weakness, loss of consciousness, light-headedness, numbness and headaches.   All other systems reviewed and are negative.      Physical Exam     Initial Vitals [08/09/17 2047]   BP Pulse Resp Temp SpO2   100/67 83 20 97.5 °F (36.4 °C) 100 %      MAP       78         Physical Exam    Nursing note and vitals reviewed.  Constitutional: He appears well-developed and well-nourished.   HENT:   Head: Normocephalic and atraumatic.   Eyes: EOM are normal.   Neck: Normal range of motion. Neck supple.   Cardiovascular: Normal rate, regular rhythm, normal heart sounds and intact distal pulses.   Pulmonary/Chest: Breath sounds normal.   Abdominal: Soft.   Musculoskeletal: Normal range of motion.        Right shoulder: He exhibits tenderness and pain. He exhibits normal range of motion, no bony tenderness, no swelling, no effusion, no crepitus, no deformity, no laceration, no spasm, normal pulse and normal strength.   Neurological: He is alert and oriented to person, place, and time.   Skin: Skin is warm and dry. Capillary refill takes less than 2 seconds.   Psychiatric: He has a normal mood and affect. His behavior is normal. Judgment and thought content normal.         ED Course   Procedures  Labs Reviewed - No data to display       X-Rays:   Independently Interpreted Readings:   Other Readings:  No fracture or dislocation    Medical  Decision Making:   Clinical Tests:   Radiological Study: Ordered and Reviewed                   ED Course     Clinical Impression:   The primary encounter diagnosis was Contusion of shoulder, right. A diagnosis of Pain was also pertinent to this visit.    Disposition:   Disposition: Discharged  Condition: Stable                        Lyn Mead MD  08/09/17 3050

## 2017-08-15 ENCOUNTER — OUTPATIENT CASE MANAGEMENT (OUTPATIENT)
Dept: ADMINISTRATIVE | Facility: OTHER | Age: 77
End: 2017-08-15

## 2017-08-15 NOTE — PROGRESS NOTES
Contacted pt and he reports he does not need assistance of any kind at this time from [facility]. Also spoke with his Daughter Gayatri Wolfe who reports the pt is not in need of any assistance from [facility]. This nurse will mail my contact information for future reference if needed. Will close case at this time. ARSEN Llanos,RN-OPCM

## 2017-08-18 ENCOUNTER — HOSPITAL ENCOUNTER (OUTPATIENT)
Facility: HOSPITAL | Age: 77
Discharge: HOME | End: 2017-08-19
Attending: EMERGENCY MEDICINE | Admitting: HOSPITALIST
Payer: MEDICARE

## 2017-08-18 DIAGNOSIS — Z98.61 CAD S/P PERCUTANEOUS CORONARY ANGIOPLASTY: Chronic | ICD-10-CM

## 2017-08-18 DIAGNOSIS — I42.0 ISCHEMIC DILATED CARDIOMYOPATHY: Chronic | ICD-10-CM

## 2017-08-18 DIAGNOSIS — E11.9 DIABETES MELLITUS TYPE 2, NONINSULIN DEPENDENT: Chronic | ICD-10-CM

## 2017-08-18 DIAGNOSIS — I25.10 CAD S/P PERCUTANEOUS CORONARY ANGIOPLASTY: Chronic | ICD-10-CM

## 2017-08-18 DIAGNOSIS — I50.42 CHRONIC COMBINED SYSTOLIC AND DIASTOLIC CONGESTIVE HEART FAILURE: Chronic | ICD-10-CM

## 2017-08-18 DIAGNOSIS — N18.30 CHRONIC KIDNEY DISEASE, STAGE 3: Chronic | ICD-10-CM

## 2017-08-18 DIAGNOSIS — R55 SYNCOPE: Primary | ICD-10-CM

## 2017-08-18 DIAGNOSIS — I25.5 ISCHEMIC DILATED CARDIOMYOPATHY: Chronic | ICD-10-CM

## 2017-08-18 LAB
ALBUMIN SERPL BCP-MCNC: 3.3 G/DL
ALP SERPL-CCNC: 150 U/L
ALT SERPL W/O P-5'-P-CCNC: 20 U/L
ANION GAP SERPL CALC-SCNC: 8 MMOL/L
AST SERPL-CCNC: 19 U/L
BASOPHILS # BLD AUTO: 0.01 K/UL
BASOPHILS NFR BLD: 0.3 %
BILIRUB SERPL-MCNC: 1.2 MG/DL
BUN SERPL-MCNC: 50 MG/DL
CALCIUM SERPL-MCNC: 8.7 MG/DL
CHLORIDE SERPL-SCNC: 109 MMOL/L
CO2 SERPL-SCNC: 21 MMOL/L
CREAT SERPL-MCNC: 1.8 MG/DL
DIFFERENTIAL METHOD: ABNORMAL
EOSINOPHIL # BLD AUTO: 0.1 K/UL
EOSINOPHIL NFR BLD: 1.6 %
ERYTHROCYTE [DISTWIDTH] IN BLOOD BY AUTOMATED COUNT: 14 %
EST. GFR  (AFRICAN AMERICAN): 41 ML/MIN/1.73 M^2
EST. GFR  (NON AFRICAN AMERICAN): 36 ML/MIN/1.73 M^2
ESTIMATED AVG GLUCOSE: 123 MG/DL
GLUCOSE SERPL-MCNC: 149 MG/DL
HBA1C MFR BLD HPLC: 5.9 %
HCT VFR BLD AUTO: 33.2 %
HGB BLD-MCNC: 10.9 G/DL
LYMPHOCYTES # BLD AUTO: 0.6 K/UL
LYMPHOCYTES NFR BLD: 19.9 %
MCH RBC QN AUTO: 32.1 PG
MCHC RBC AUTO-ENTMCNC: 32.8 G/DL
MCV RBC AUTO: 98 FL
MONOCYTES # BLD AUTO: 0.6 K/UL
MONOCYTES NFR BLD: 18.3 %
NEUTROPHILS # BLD AUTO: 1.9 K/UL
NEUTROPHILS NFR BLD: 59.9 %
PLATELET # BLD AUTO: 114 K/UL
PMV BLD AUTO: 11.1 FL
POCT GLUCOSE: 160 MG/DL (ref 70–110)
POCT GLUCOSE: 170 MG/DL (ref 70–110)
POTASSIUM SERPL-SCNC: 4.3 MMOL/L
PROT SERPL-MCNC: 6.4 G/DL
RBC # BLD AUTO: 3.4 M/UL
SODIUM SERPL-SCNC: 138 MMOL/L
TROPONIN I SERPL DL<=0.01 NG/ML-MCNC: 0.02 NG/ML
WBC # BLD AUTO: 3.17 K/UL

## 2017-08-18 PROCEDURE — 85025 COMPLETE CBC W/AUTO DIFF WBC: CPT

## 2017-08-18 PROCEDURE — 84484 ASSAY OF TROPONIN QUANT: CPT

## 2017-08-18 PROCEDURE — G0378 HOSPITAL OBSERVATION PER HR: HCPCS

## 2017-08-18 PROCEDURE — 93010 ELECTROCARDIOGRAM REPORT: CPT | Mod: ,,, | Performed by: INTERNAL MEDICINE

## 2017-08-18 PROCEDURE — 94761 N-INVAS EAR/PLS OXIMETRY MLT: CPT

## 2017-08-18 PROCEDURE — 25000003 PHARM REV CODE 250: Performed by: NURSE PRACTITIONER

## 2017-08-18 PROCEDURE — 25000003 PHARM REV CODE 250: Performed by: EMERGENCY MEDICINE

## 2017-08-18 PROCEDURE — 36415 COLL VENOUS BLD VENIPUNCTURE: CPT

## 2017-08-18 PROCEDURE — 99285 EMERGENCY DEPT VISIT HI MDM: CPT | Mod: 25

## 2017-08-18 PROCEDURE — 93005 ELECTROCARDIOGRAM TRACING: CPT

## 2017-08-18 PROCEDURE — 96361 HYDRATE IV INFUSION ADD-ON: CPT

## 2017-08-18 PROCEDURE — 83036 HEMOGLOBIN GLYCOSYLATED A1C: CPT

## 2017-08-18 PROCEDURE — 80053 COMPREHEN METABOLIC PANEL: CPT

## 2017-08-18 PROCEDURE — 63600175 PHARM REV CODE 636 W HCPCS: Performed by: HOSPITALIST

## 2017-08-18 PROCEDURE — 96360 HYDRATION IV INFUSION INIT: CPT

## 2017-08-18 RX ORDER — ASPIRIN 81 MG/1
81 TABLET ORAL DAILY
Status: DISCONTINUED | OUTPATIENT
Start: 2017-08-18 | End: 2017-08-19 | Stop reason: HOSPADM

## 2017-08-18 RX ORDER — CARVEDILOL 12.5 MG/1
12.5 TABLET ORAL 2 TIMES DAILY
Status: DISCONTINUED | OUTPATIENT
Start: 2017-08-18 | End: 2017-08-19 | Stop reason: HOSPADM

## 2017-08-18 RX ORDER — GLUCAGON 1 MG
1 KIT INJECTION
Status: DISCONTINUED | OUTPATIENT
Start: 2017-08-18 | End: 2017-08-19 | Stop reason: HOSPADM

## 2017-08-18 RX ORDER — FAMOTIDINE 20 MG/1
20 TABLET, FILM COATED ORAL DAILY
Status: DISCONTINUED | OUTPATIENT
Start: 2017-08-19 | End: 2017-08-19 | Stop reason: HOSPADM

## 2017-08-18 RX ORDER — ACETAMINOPHEN 325 MG/1
650 TABLET ORAL EVERY 6 HOURS PRN
Status: DISCONTINUED | OUTPATIENT
Start: 2017-08-18 | End: 2017-08-19 | Stop reason: HOSPADM

## 2017-08-18 RX ORDER — FUROSEMIDE 20 MG/1
20 TABLET ORAL DAILY
Status: DISCONTINUED | OUTPATIENT
Start: 2017-08-18 | End: 2017-08-19 | Stop reason: HOSPADM

## 2017-08-18 RX ORDER — TAMSULOSIN HYDROCHLORIDE 0.4 MG/1
0.4 CAPSULE ORAL DAILY
Status: DISCONTINUED | OUTPATIENT
Start: 2017-08-18 | End: 2017-08-18

## 2017-08-18 RX ORDER — CLOPIDOGREL BISULFATE 75 MG/1
75 TABLET ORAL DAILY
Status: DISCONTINUED | OUTPATIENT
Start: 2017-08-18 | End: 2017-08-19 | Stop reason: HOSPADM

## 2017-08-18 RX ORDER — FAMOTIDINE 20 MG/1
20 TABLET, FILM COATED ORAL 2 TIMES DAILY
Status: DISCONTINUED | OUTPATIENT
Start: 2017-08-18 | End: 2017-08-18 | Stop reason: DRUGHIGH

## 2017-08-18 RX ORDER — AMOXICILLIN 250 MG
1 CAPSULE ORAL 2 TIMES DAILY PRN
Status: DISCONTINUED | OUTPATIENT
Start: 2017-08-18 | End: 2017-08-19 | Stop reason: HOSPADM

## 2017-08-18 RX ORDER — ISOSORBIDE MONONITRATE 60 MG/1
60 TABLET, EXTENDED RELEASE ORAL DAILY
Status: DISCONTINUED | OUTPATIENT
Start: 2017-08-18 | End: 2017-08-19

## 2017-08-18 RX ORDER — POLYETHYLENE GLYCOL 3350 17 G/17G
17 POWDER, FOR SOLUTION ORAL NIGHTLY
Status: DISCONTINUED | OUTPATIENT
Start: 2017-08-18 | End: 2017-08-19 | Stop reason: HOSPADM

## 2017-08-18 RX ORDER — INSULIN ASPART 100 [IU]/ML
0-5 INJECTION, SOLUTION INTRAVENOUS; SUBCUTANEOUS
Status: DISCONTINUED | OUTPATIENT
Start: 2017-08-18 | End: 2017-08-19 | Stop reason: HOSPADM

## 2017-08-18 RX ORDER — CLOPIDOGREL BISULFATE 75 MG/1
75 TABLET ORAL DAILY
Status: DISCONTINUED | OUTPATIENT
Start: 2017-08-18 | End: 2017-08-18 | Stop reason: SDUPTHER

## 2017-08-18 RX ORDER — ESCITALOPRAM OXALATE 10 MG/1
10 TABLET ORAL DAILY
Status: DISCONTINUED | OUTPATIENT
Start: 2017-08-18 | End: 2017-08-19 | Stop reason: HOSPADM

## 2017-08-18 RX ORDER — SODIUM CHLORIDE 9 MG/ML
INJECTION, SOLUTION INTRAVENOUS CONTINUOUS
Status: ACTIVE | OUTPATIENT
Start: 2017-08-18 | End: 2017-08-18

## 2017-08-18 RX ORDER — IBUPROFEN 200 MG
24 TABLET ORAL
Status: DISCONTINUED | OUTPATIENT
Start: 2017-08-18 | End: 2017-08-19 | Stop reason: HOSPADM

## 2017-08-18 RX ORDER — ALLOPURINOL 100 MG/1
100 TABLET ORAL DAILY
Status: DISCONTINUED | OUTPATIENT
Start: 2017-08-18 | End: 2017-08-19 | Stop reason: HOSPADM

## 2017-08-18 RX ORDER — PANTOPRAZOLE SODIUM 40 MG/1
40 TABLET, DELAYED RELEASE ORAL DAILY
Status: DISCONTINUED | OUTPATIENT
Start: 2017-08-18 | End: 2017-08-19 | Stop reason: HOSPADM

## 2017-08-18 RX ORDER — FOSINOPIRL SODIUM 10 MG/1
20 TABLET ORAL DAILY
Status: DISCONTINUED | OUTPATIENT
Start: 2017-08-18 | End: 2017-08-19

## 2017-08-18 RX ORDER — ATORVASTATIN CALCIUM 20 MG/1
20 TABLET, FILM COATED ORAL NIGHTLY
Status: DISCONTINUED | OUTPATIENT
Start: 2017-08-18 | End: 2017-08-19 | Stop reason: HOSPADM

## 2017-08-18 RX ORDER — IBUPROFEN 200 MG
16 TABLET ORAL
Status: DISCONTINUED | OUTPATIENT
Start: 2017-08-18 | End: 2017-08-19 | Stop reason: HOSPADM

## 2017-08-18 RX ORDER — TAMSULOSIN HYDROCHLORIDE 0.4 MG/1
0.4 CAPSULE ORAL NIGHTLY
Status: DISCONTINUED | OUTPATIENT
Start: 2017-08-18 | End: 2017-08-19 | Stop reason: HOSPADM

## 2017-08-18 RX ORDER — ENOXAPARIN SODIUM 100 MG/ML
30 INJECTION SUBCUTANEOUS EVERY 24 HOURS
Status: DISCONTINUED | OUTPATIENT
Start: 2017-08-18 | End: 2017-08-19 | Stop reason: HOSPADM

## 2017-08-18 RX ORDER — ENOXAPARIN SODIUM 100 MG/ML
40 INJECTION SUBCUTANEOUS EVERY 24 HOURS
Status: DISCONTINUED | OUTPATIENT
Start: 2017-08-18 | End: 2017-08-18 | Stop reason: DRUGHIGH

## 2017-08-18 RX ADMIN — SODIUM CHLORIDE: 0.9 INJECTION, SOLUTION INTRAVENOUS at 01:08

## 2017-08-18 RX ADMIN — ENOXAPARIN SODIUM 30 MG: 100 INJECTION SUBCUTANEOUS at 10:08

## 2017-08-18 RX ADMIN — TAMSULOSIN HYDROCHLORIDE 0.4 MG: 0.4 CAPSULE ORAL at 10:08

## 2017-08-18 RX ADMIN — ATORVASTATIN CALCIUM 20 MG: 20 TABLET, FILM COATED ORAL at 10:08

## 2017-08-18 RX ADMIN — POLYETHYLENE GLYCOL 3350 17 G: 17 POWDER, FOR SOLUTION ORAL at 10:08

## 2017-08-18 NOTE — ED NOTES
Pt ambulates around nurse station with steady gait. Denies any feelings of being lightheaded. Assisted back into bed.

## 2017-08-18 NOTE — PROGRESS NOTES
Lovenox dose changed to 30 mg daily based on patients renal function ( P&T approved pharmacy protocol)   Estimated Creatinine Clearance: 31.4 mL/min (based on Cr of 1.8).
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
## 2017-08-18 NOTE — PLAN OF CARE
Spoke to Mayra in the Ed in reference to ICD report from [employer], she said she would send it up through the tube.    I place report in chart

## 2017-08-18 NOTE — PROGRESS NOTES
Renal dose adjustment:Pepcid dose decreased to 20 mg once a day per P&T approved pharmacy protocol.('s guidelines for CrCl =<50 Administer 50% of dose or increase the dosing interval to every 36 to 48 hours to limit potential CNS adverse effects.)

## 2017-08-19 VITALS
SYSTOLIC BLOOD PRESSURE: 92 MMHG | OXYGEN SATURATION: 96 % | HEART RATE: 74 BPM | WEIGHT: 138.69 LBS | DIASTOLIC BLOOD PRESSURE: 63 MMHG | BODY MASS INDEX: 21.02 KG/M2 | HEIGHT: 68 IN | RESPIRATION RATE: 19 BRPM | TEMPERATURE: 99 F

## 2017-08-19 PROBLEM — R55 SYNCOPE: Status: RESOLVED | Noted: 2017-08-18 | Resolved: 2017-08-19

## 2017-08-19 LAB
POCT GLUCOSE: 113 MG/DL (ref 70–110)
POCT GLUCOSE: 166 MG/DL (ref 70–110)

## 2017-08-19 PROCEDURE — 25000003 PHARM REV CODE 250: Performed by: EMERGENCY MEDICINE

## 2017-08-19 PROCEDURE — 25000003 PHARM REV CODE 250: Performed by: HOSPITALIST

## 2017-08-19 PROCEDURE — G0378 HOSPITAL OBSERVATION PER HR: HCPCS

## 2017-08-19 PROCEDURE — 25000003 PHARM REV CODE 250: Performed by: NURSE PRACTITIONER

## 2017-08-19 PROCEDURE — 94761 N-INVAS EAR/PLS OXIMETRY MLT: CPT

## 2017-08-19 RX ORDER — FOSINOPIRL SODIUM 10 MG/1
10 TABLET ORAL DAILY
Status: DISCONTINUED | OUTPATIENT
Start: 2017-08-19 | End: 2017-08-19 | Stop reason: HOSPADM

## 2017-08-19 RX ORDER — TAMSULOSIN HYDROCHLORIDE 0.4 MG/1
1 CAPSULE ORAL NIGHTLY
Start: 2017-08-19

## 2017-08-19 RX ORDER — ISOSORBIDE MONONITRATE 30 MG/1
30 TABLET, EXTENDED RELEASE ORAL DAILY
Status: DISCONTINUED | OUTPATIENT
Start: 2017-08-19 | End: 2017-08-19 | Stop reason: HOSPADM

## 2017-08-19 RX ADMIN — ISOSORBIDE MONONITRATE 30 MG: 30 TABLET, EXTENDED RELEASE ORAL at 10:08

## 2017-08-19 RX ADMIN — FOSINOPRIL 10 MG: 10 TABLET ORAL at 10:08

## 2017-08-19 RX ADMIN — CARVEDILOL 12.5 MG: 12.5 TABLET, FILM COATED ORAL at 10:08

## 2017-08-19 RX ADMIN — ALLOPURINOL 100 MG: 100 TABLET ORAL at 10:08

## 2017-08-19 RX ADMIN — ASPIRIN 81 MG: 81 TABLET, COATED ORAL at 10:08

## 2017-08-19 RX ADMIN — FAMOTIDINE 20 MG: 20 TABLET ORAL at 10:08

## 2017-08-19 RX ADMIN — FUROSEMIDE 20 MG: 20 TABLET ORAL at 10:08

## 2017-08-19 RX ADMIN — PANTOPRAZOLE SODIUM 40 MG: 40 TABLET, DELAYED RELEASE ORAL at 10:08

## 2017-08-19 RX ADMIN — CLOPIDOGREL BISULFATE 75 MG: 75 TABLET ORAL at 10:08

## 2017-08-19 NOTE — ASSESSMENT & PLAN NOTE
HgA1c 5.9.  Holding his home Tradjenta 5 mg daily for now.  This could cause hypoglycemia and that cound not be ruled out as cause because he ate prior to checking his blood sugar.  If he continues, he should check blood sugars at home.

## 2017-08-19 NOTE — NURSING
Patient had 10 beats of vtach, asymptomatic, vital signs stable.  NP Emilee notified.  No current orders given at this time, staff will continue to monitor.
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
## 2017-08-19 NOTE — ASSESSMENT & PLAN NOTE
Ischemic Dilated Cardiomyopathy, CAD, ICD, Hyperlipidemia  8/10/16 Echo shows EF of 10%  He does have history of V-Tach, but ICD interrogation shows no V-tach since 7/30/17 (only 1 second run).  --Tele Monitoring  --Continue home ASA 81 mg daily, Plavix 75 mg daily, aorvstastin 20 mg daily, and prn SL Nitro 0.4 mg
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
## 2017-08-19 NOTE — PLAN OF CARE
Discharge orders noted, no HH or HME ordered.    Future Appointments  Date Time Provider Department Center   10/9/2017 8:00 AM HOME MONITOR DEVICE CHECK, [facility]          08/19/17 1148   Final Note   Assessment Type Final Discharge Note   Discharge Disposition Home   What phone number can be called within the next 1-3 days to see how you are doing after discharge? [phone]   Hospital Follow Up  Appt(s) scheduled? No  (offices closed, [place] to follow up)   Offered [facility] -- Bedside Delivery? n/a   Discharge/Hospital Encounter Summary to (non-[facility]) PCP Yes   Referral to Outpatient Case Management complete? n/a   Referral to / orders for Home Health Complete? n/a   30 day supply of medicines given at discharge, if documented non-compliance / non-adherence? n/a   Any social issues identified prior to discharge? n/a   Did you assess the readiness or willingness of the family or caregiver to support self management of care? n/a   Right Care Referral Info   Post Acute Recommendation No Care     Jazmine Johnson RN Transitional Navigator  [phone]

## 2017-08-19 NOTE — DISCHARGE INSTRUCTIONS
SYNCOPE, UNK CAUSE (ENGLISH) View Edit Remove  BLOOD SUGAR, LOW; HYPOGLYCEMIA (ENGLISH) View Edit Remove  TAMSULOSIN CAPSULES (ENGLISH) View Edit Remove  HEART FAILURE, DISCHARGE INSTRUCTIONS FOR (ENGLISH) View Edit Remove  HEART FAILURE, COPING WITH (ENGLISH) View Edit Remove  HEART FAILURE: WARNING SIGNS OF A FLARE-UP (ENGLISH) View Edit Remove  HEART FAILURE: TRACKING YOUR WEIGHT (ENGLISH) View Edit Remove  DIET, DISCHARGE INSTRUCTIONS FOR EATING A LOW-SALT (ENGLISH) View Edit Remove  LOW-SALT CHOICES (ENGLISH) View Edit Remove  KIDNEY DISEASE: EATING LESS SODIUM (ENGLISH) View Edit Remove  SALT, TIPS FOR USING LESS (ENGLISH) View Edit Remove  COOKING, QUICK AND HEALTHY (ENGLISH) View Edit Remove  COOKING, LOW-FAT TIPS (ENGLISH) View Edit Remove  EATING ON THE GO, HEALTHY (ENGLISH) View Edit Remove  LOW-FAT MEALS, ADDING FLAVOR (ENGLISH) View Edit Remove
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
## 2017-08-20 NOTE — HOSPITAL COURSE
Monitored overnight without any significant telemetry events. BS were in the normal range. He felt like is normal self. His A1c is 5.9, so he will stop the Tradjenta on discharge in case it was hypoglycemia as the cause of his syncope.         Daily Note  Feeling good today. Ambulating around without any issues.

## 2017-08-20 NOTE — ASSESSMENT & PLAN NOTE
No events on telemetry other than PVCs and non-sustained VT. BS were fine. Not orthostatic. Likely related to hypoglycemia, but not able to definitively prove it. Stopping diabetes medication as he is well controlled.
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
## 2017-08-22 ENCOUNTER — OUTPATIENT CASE MANAGEMENT (OUTPATIENT)
Dept: ADMINISTRATIVE | Facility: OTHER | Age: 77
End: 2017-08-22

## 2017-08-22 NOTE — PROGRESS NOTES
Thank you for the referral.   For your information:    The following patient has been assigned to Keyana Llanos RN with Outpatient Complex Care Management for high risk screening.    Reason:   Ischemic dilated cardiomyopathy  Chronic combined systolic and diastolic congestive heart failure  CAD S/P percutaneous coronary angioplasty  Diabetes mellitus type 2, noninsulin dependent  Chronic kidney disease, stage 3    Please contact [facility] at [phone] with any questions.    Thank you,  Ivette Mauricio

## 2017-08-24 ENCOUNTER — OUTPATIENT CASE MANAGEMENT (OUTPATIENT)
Dept: ADMINISTRATIVE | Facility: OTHER | Age: 77
End: 2017-08-24

## 2017-08-24 NOTE — PROGRESS NOTES
Attempt to complete initial assessment for Outpatient Care Management; left message requesting return call. Amari RN-OPCM

## 2017-08-25 ENCOUNTER — OUTPATIENT CASE MANAGEMENT (OUTPATIENT)
Dept: ADMINISTRATIVE | Facility: OTHER | Age: 77
End: 2017-08-25

## 2017-08-25 NOTE — PROGRESS NOTES
-- 2nd attempt to complete initial assessment for OPCM, left message requesting a return call. As this is my second unsuccessful attempt to complete initial assessment for OPCM, I will mail a letter with my contact information. Amari RAMIREZ-OPCM

## 2017-08-25 NOTE — LETTER
August 25, 2017    Trever Claros  [address] [place] [address]             [facility] [address] [facility] [address] Dear Mr Claros,    I work with [facility] Outpatient Case Management Department. We received a referral to call you to discuss your medical history.These services are free of charge and are offered to [facility] patients who have recently been discharged from any of our facilities or who have complex medical conditions that may require the skill of a nurse to assist with management.             I am a Registered Nurse who specializes in connecting patients with available medical and financial resources as well as addressing any educational needs that may be indicated.      I attempted to reach you by telephone, but I was unsuccessful. Please call our department so that we can go over some questions with you regarding your health.    The Outpatient Case Management Department can be reached at [phone] from 8:00AM to 4:30 PM on Monday thru Friday. [facility] also has a program where a nurse is available 24/7 to answer questions or provide medical advice, their number is [phone].    Thanks,      Keyana Llanos RN  Outpatient Complex Case Management

## 2017-08-28 ENCOUNTER — TELEPHONE (OUTPATIENT)
Dept: FAMILY MEDICINE | Facility: CLINIC | Age: 77
End: 2017-08-28

## 2017-08-28 RX ORDER — CODEINE PHOSPHATE AND GUAIFENESIN 10; 100 MG/5ML; MG/5ML
10 SOLUTION ORAL EVERY 4 HOURS PRN
Qty: 240 ML | Refills: 0 | Status: SHIPPED | OUTPATIENT
Start: 2017-08-28 | End: 2017-09-07

## 2017-08-28 NOTE — TELEPHONE ENCOUNTER
----- Message from Keri Aly sent at 8/28/2017  3:35 PM CDT -----  Contact: Gayatri (daughter) [phone]  C/o coughing want to know can he get a script for cough medicine. Please send to [facility] in [place].

## 2017-09-06 ENCOUNTER — OUTPATIENT CASE MANAGEMENT (OUTPATIENT)
Dept: ADMINISTRATIVE | Facility: OTHER | Age: 77
End: 2017-09-06

## 2017-09-06 NOTE — PROGRESS NOTES
-- 3rd Attempt to complete initial assessment for Outpatient Care Management;left message requesting a return call. A letter was mailed at the time of my 2nd attempt to complete initial assessment, requesting a return call from patient. Will close case at this time. Amari RAMIREZ-OPCM

## 2017-09-07 ENCOUNTER — OFFICE VISIT (OUTPATIENT)
Dept: FAMILY MEDICINE | Facility: CLINIC | Age: 77
End: 2017-09-07
Payer: MEDICARE

## 2017-09-07 VITALS
BODY MASS INDEX: 20.75 KG/M2 | HEART RATE: 73 BPM | HEIGHT: 68 IN | DIASTOLIC BLOOD PRESSURE: 66 MMHG | WEIGHT: 136.88 LBS | OXYGEN SATURATION: 99 % | TEMPERATURE: 98 F | SYSTOLIC BLOOD PRESSURE: 108 MMHG

## 2017-09-07 DIAGNOSIS — R55 SYNCOPE, UNSPECIFIED SYNCOPE TYPE: Primary | ICD-10-CM

## 2017-09-07 PROCEDURE — 1159F MED LIST DOCD IN RCRD: CPT | Mod: S$GLB,,, | Performed by: FAMILY MEDICINE

## 2017-09-07 PROCEDURE — 99213 OFFICE O/P EST LOW 20 MIN: CPT | Mod: S$GLB,,, | Performed by: FAMILY MEDICINE

## 2017-09-07 PROCEDURE — 3078F DIAST BP <80 MM HG: CPT | Mod: S$GLB,,, | Performed by: FAMILY MEDICINE

## 2017-09-07 PROCEDURE — 3074F SYST BP LT 130 MM HG: CPT | Mod: S$GLB,,, | Performed by: FAMILY MEDICINE

## 2017-09-07 PROCEDURE — 1126F AMNT PAIN NOTED NONE PRSNT: CPT | Mod: S$GLB,,, | Performed by: FAMILY MEDICINE

## 2017-09-07 RX ORDER — ALPRAZOLAM 0.25 MG/1
0.25 TABLET ORAL NIGHTLY PRN
Qty: 30 TABLET | Refills: 5 | Status: SHIPPED | OUTPATIENT
Start: 2017-09-07
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
## 2017-09-19 ENCOUNTER — TELEPHONE (OUTPATIENT)
Dept: FAMILY MEDICINE | Facility: CLINIC | Age: 77
End: 2017-09-19

## 2017-09-19 ENCOUNTER — HOSPITAL ENCOUNTER (EMERGENCY)
Facility: HOSPITAL | Age: 77
Discharge: HOME | End: 2017-09-19
Attending: EMERGENCY MEDICINE
Payer: MEDICARE

## 2017-09-19 VITALS
HEART RATE: 65 BPM | DIASTOLIC BLOOD PRESSURE: 63 MMHG | OXYGEN SATURATION: 95 % | RESPIRATION RATE: 18 BRPM | BODY MASS INDEX: 21.07 KG/M2 | WEIGHT: 139 LBS | SYSTOLIC BLOOD PRESSURE: 95 MMHG | HEIGHT: 68 IN | TEMPERATURE: 99 F

## 2017-09-19 DIAGNOSIS — R05.9 COUGH: Primary | ICD-10-CM

## 2017-09-19 DIAGNOSIS — I50.9 CHRONIC CONGESTIVE HEART FAILURE, UNSPECIFIED CONGESTIVE HEART FAILURE TYPE: ICD-10-CM

## 2017-09-19 LAB
ANION GAP SERPL CALC-SCNC: 12 MMOL/L
BASOPHILS # BLD AUTO: 0.01 K/UL
BASOPHILS NFR BLD: 0.2 %
BUN SERPL-MCNC: 44 MG/DL
CALCIUM SERPL-MCNC: 9.2 MG/DL
CHLORIDE SERPL-SCNC: 108 MMOL/L
CO2 SERPL-SCNC: 20 MMOL/L
CREAT SERPL-MCNC: 1.8 MG/DL
DIFFERENTIAL METHOD: ABNORMAL
EOSINOPHIL # BLD AUTO: 0.1 K/UL
EOSINOPHIL NFR BLD: 1 %
ERYTHROCYTE [DISTWIDTH] IN BLOOD BY AUTOMATED COUNT: 13.7 %
EST. GFR  (AFRICAN AMERICAN): 41.3 ML/MIN/1.73 M^2
EST. GFR  (NON AFRICAN AMERICAN): 35.8 ML/MIN/1.73 M^2
GLUCOSE SERPL-MCNC: 135 MG/DL
HCT VFR BLD AUTO: 33.8 %
HGB BLD-MCNC: 10.8 G/DL
LYMPHOCYTES # BLD AUTO: 0.4 K/UL
LYMPHOCYTES NFR BLD: 8.3 %
MCH RBC QN AUTO: 31.9 PG
MCHC RBC AUTO-ENTMCNC: 32 G/DL
MCV RBC AUTO: 100 FL
MONOCYTES # BLD AUTO: 0.8 K/UL
MONOCYTES NFR BLD: 16.1 %
NEUTROPHILS # BLD AUTO: 3.9 K/UL
NEUTROPHILS NFR BLD: 74.2 %
NT-PROBNP: ABNORMAL PG/ML
PLATELET # BLD AUTO: 109 K/UL
PMV BLD AUTO: 10.8 FL
POTASSIUM SERPL-SCNC: 4.4 MMOL/L
RBC # BLD AUTO: 3.39 M/UL
SODIUM SERPL-SCNC: 140 MMOL/L
WBC # BLD AUTO: 5.21 K/UL

## 2017-09-19 PROCEDURE — 83880 ASSAY OF NATRIURETIC PEPTIDE: CPT

## 2017-09-19 PROCEDURE — 80048 BASIC METABOLIC PNL TOTAL CA: CPT

## 2017-09-19 PROCEDURE — 85025 COMPLETE CBC W/AUTO DIFF WBC: CPT

## 2017-09-19 PROCEDURE — 99284 EMERGENCY DEPT VISIT MOD MDM: CPT

## 2017-09-19 RX ORDER — AMOXICILLIN AND CLAVULANATE POTASSIUM 875; 125 MG/1; MG/1
1 TABLET, FILM COATED ORAL 2 TIMES DAILY
Qty: 20 TABLET | Refills: 0 | Status: SHIPPED | OUTPATIENT
Start: 2017-09-19 | End: 2017-09-29

## 2017-09-19 RX ORDER — BENZONATATE 200 MG/1
200 CAPSULE ORAL 3 TIMES DAILY PRN
Qty: 30 CAPSULE | Refills: 0 | Status: SHIPPED | OUTPATIENT
Start: 2017-09-19 | End: 2017-09-29

## 2017-09-19 RX ORDER — HYDROCODONE BITARTRATE AND HOMATROPINE METHYLBROMIDE ORAL SOLUTION 5; 1.5 MG/5ML; MG/5ML
5 LIQUID ORAL EVERY 4 HOURS PRN
Qty: 120 ML | Refills: 0 | Status: SHIPPED | OUTPATIENT
Start: 2017-09-19 | End: 2018-01-09

## 2017-09-19 NOTE — TELEPHONE ENCOUNTER
----- Message from Aziza Balderas sent at 9/19/2017  4:11 PM CDT -----  Contact: daughter Gayatri [phone]  Patient has persistant cough. Has had about 3 weeks. Saw  for same symptoms. Patient has been taking OTC Robitussin. Patient can not take codeine because of his heart.  Daughter wants to know if antibiotic & a cough syrup.     [facility]

## 2017-09-20 NOTE — ED PROVIDER NOTES
Encounter Date: 9/19/2017       History     Chief Complaint   Patient presents with    Cough     cough for one week and it is dry. I been having a runny nose too. I saw Dr Mtz a week ago. He gave me a benzonatate 100 mg.      The history is provided by the patient.   Cough   This is a new problem. The current episode started several days ago. The problem occurs every few hours. The problem has been unchanged. The cough is non-productive. There has been no fever. Pertinent negatives include no chest pain, no chills, no sweats, no weight loss, no ear congestion, no headaches, no rhinorrhea, no sore throat, no myalgias, no shortness of breath and no wheezing. The treatment provided mild relief. He is not a smoker. His past medical history does not include COPD.     Review of patient's allergies indicates:   Allergen Reactions    Antihistamines - alkylamine      Due to prostate     Sulfa (sulfonamide antibiotics) Hives     Past Medical History:   Diagnosis Date    Chronic combined systolic and diastolic congestive heart failure     Coronary artery disease     Diabetes mellitus, type II     Hyperlipidemia     Hypertension     Primary cardiomyopathy     Valvular regurgitation     m/s MR    Ventricular tachycardia      Past Surgical History:   Procedure Laterality Date    CARDIAC DEFIBRILLATOR PLACEMENT  10/2/2008; 1/5/2015    [employer]; converted to biventricular ICD VIVA QUAD XT CRT-D [id number]: Serial No. [id number]    CARDIAC DEFIBRILLATOR PLACEMENT      CORONARY ANGIOPLASTY WITH STENT PLACEMENT  4/15/2008; 10/16/2014    LAD; RCA    KNEE SURGERY      left knee     Family History   Problem Relation Age of Onset    Diabetes Maternal Aunt      Social History   Substance Use Topics    Smoking status: Never Smoker    Smokeless tobacco: Never Used    Alcohol use No     Review of Systems   Constitutional: Negative for chills and weight loss.   HENT: Negative for rhinorrhea and sore throat.    Respiratory:  Positive for cough. Negative for shortness of breath and wheezing.    Cardiovascular: Negative for chest pain.   Musculoskeletal: Negative for myalgias.   Neurological: Negative for headaches.   All other systems reviewed and are negative.      Physical Exam     Initial Vitals [09/19/17 1840]   BP Pulse Resp Temp SpO2   (!) 92/58 98 (!) 22 99.2 °F (37.3 °C) 97 %      MAP       69.33         Physical Exam    Nursing note and vitals reviewed.  Constitutional: He appears well-developed and well-nourished.   HENT:   Head: Normocephalic and atraumatic.   Eyes: EOM are normal.   Neck: Normal range of motion. Neck supple.   Cardiovascular: Normal rate, regular rhythm, normal heart sounds and intact distal pulses.   Pulmonary/Chest: Breath sounds normal.   Abdominal: Soft.   Musculoskeletal: Normal range of motion.   Neurological: He is alert and oriented to person, place, and time.   Skin: Skin is warm and dry. Capillary refill takes less than 2 seconds.   Psychiatric: He has a normal mood and affect. His behavior is normal. Judgment and thought content normal.         ED Course   Procedures  Labs Reviewed   CBC W/ AUTO DIFFERENTIAL   BASIC METABOLIC PANEL   NT-PRO NATRIURETIC PEPTIDE          X-Rays:   Independently Interpreted Readings:   Chest X-Ray: Normal heart size.  No infiltrates.  No acute abnormalities.     Medical Decision Making:   Clinical Tests:   Lab Tests: Ordered and Reviewed  Radiological Study: Ordered and Reviewed                   ED Course      Clinical Impression:   The primary encounter diagnosis was Cough. A diagnosis of Chronic congestive heart failure, unspecified congestive heart failure type was also pertinent to this visit.    Disposition:   Disposition: Discharged  Condition: Stable                        Lyn Mead MD  09/19/17 2044

## 2017-09-29 ENCOUNTER — HOSPITAL ENCOUNTER (EMERGENCY)
Facility: HOSPITAL | Age: 77
Discharge: HOME | End: 2017-09-29
Attending: EMERGENCY MEDICINE
Payer: MEDICARE

## 2017-09-29 VITALS
HEIGHT: 68 IN | BODY MASS INDEX: 21.07 KG/M2 | SYSTOLIC BLOOD PRESSURE: 95 MMHG | DIASTOLIC BLOOD PRESSURE: 66 MMHG | HEART RATE: 74 BPM | WEIGHT: 139 LBS | TEMPERATURE: 98 F | OXYGEN SATURATION: 96 % | RESPIRATION RATE: 18 BRPM

## 2017-09-29 DIAGNOSIS — I50.9 ACUTE ON CHRONIC CONGESTIVE HEART FAILURE, UNSPECIFIED CONGESTIVE HEART FAILURE TYPE: Primary | ICD-10-CM

## 2017-09-29 DIAGNOSIS — R06.02 SOB (SHORTNESS OF BREATH): ICD-10-CM

## 2017-09-29 LAB
ALBUMIN SERPL BCP-MCNC: 3.9 G/DL
ALP SERPL-CCNC: 135 U/L
ALT SERPL W/O P-5'-P-CCNC: 61 U/L
ANION GAP SERPL CALC-SCNC: 10 MMOL/L
AST SERPL-CCNC: 52 U/L
BASOPHILS # BLD AUTO: 0.02 K/UL
BASOPHILS NFR BLD: 0.5 %
BILIRUB SERPL-MCNC: 1.1 MG/DL
BUN SERPL-MCNC: 43 MG/DL
CALCIUM SERPL-MCNC: 9.6 MG/DL
CHLORIDE SERPL-SCNC: 112 MMOL/L
CO2 SERPL-SCNC: 21 MMOL/L
CREAT SERPL-MCNC: 1.71 MG/DL
DIFFERENTIAL METHOD: ABNORMAL
EOSINOPHIL # BLD AUTO: 0.1 K/UL
EOSINOPHIL NFR BLD: 1.8 %
ERYTHROCYTE [DISTWIDTH] IN BLOOD BY AUTOMATED COUNT: 13.9 %
EST. GFR  (AFRICAN AMERICAN): 44 ML/MIN/1.73 M^2
EST. GFR  (NON AFRICAN AMERICAN): 38 ML/MIN/1.73 M^2
GLUCOSE SERPL-MCNC: 132 MG/DL
HCT VFR BLD AUTO: 35.6 %
HGB BLD-MCNC: 11.2 G/DL
LYMPHOCYTES # BLD AUTO: 0.9 K/UL
LYMPHOCYTES NFR BLD: 22.6 %
MCH RBC QN AUTO: 31 PG
MCHC RBC AUTO-ENTMCNC: 31.5 G/DL
MCV RBC AUTO: 99 FL
MONOCYTES # BLD AUTO: 0.6 K/UL
MONOCYTES NFR BLD: 16.5 %
NEUTROPHILS # BLD AUTO: 2.3 K/UL
NEUTROPHILS NFR BLD: 58.3 %
NT-PROBNP: ABNORMAL PG/ML
PLATELET # BLD AUTO: 152 K/UL
PMV BLD AUTO: 11.1 FL
POTASSIUM SERPL-SCNC: 4 MMOL/L
PROT SERPL-MCNC: 7 G/DL
RBC # BLD AUTO: 3.61 M/UL
SODIUM SERPL-SCNC: 143 MMOL/L
TROPONIN I SERPL DL<=0.01 NG/ML-MCNC: 0.03 NG/ML
WBC # BLD AUTO: 3.89 K/UL

## 2017-09-29 PROCEDURE — 96374 THER/PROPH/DIAG INJ IV PUSH: CPT

## 2017-09-29 PROCEDURE — 83880 ASSAY OF NATRIURETIC PEPTIDE: CPT

## 2017-09-29 PROCEDURE — 63600175 PHARM REV CODE 636 W HCPCS: Performed by: EMERGENCY MEDICINE

## 2017-09-29 PROCEDURE — 84484 ASSAY OF TROPONIN QUANT: CPT

## 2017-09-29 PROCEDURE — 99284 EMERGENCY DEPT VISIT MOD MDM: CPT | Mod: 25

## 2017-09-29 PROCEDURE — 93010 ELECTROCARDIOGRAM REPORT: CPT | Mod: ,,, | Performed by: INTERNAL MEDICINE

## 2017-09-29 PROCEDURE — 93005 ELECTROCARDIOGRAM TRACING: CPT

## 2017-09-29 PROCEDURE — 80053 COMPREHEN METABOLIC PANEL: CPT

## 2017-09-29 PROCEDURE — 85025 COMPLETE CBC W/AUTO DIFF WBC: CPT

## 2017-09-29 RX ORDER — FUROSEMIDE 10 MG/ML
40 INJECTION INTRAMUSCULAR; INTRAVENOUS
Status: COMPLETED | OUTPATIENT
Start: 2017-09-29 | End: 2017-09-29

## 2017-09-29 RX ORDER — FUROSEMIDE 20 MG/1
20 TABLET ORAL EVERY 12 HOURS
Qty: 90 TABLET | Refills: 3 | Status: SHIPPED | OUTPATIENT
Start: 2017-09-29 | End: 2017-11-09 | Stop reason: SDUPTHER

## 2017-09-29 RX ADMIN — FUROSEMIDE 40 MG: 10 INJECTION, SOLUTION INTRAMUSCULAR; INTRAVENOUS at 12:09

## 2017-09-29 NOTE — ED NOTES
EKG completed and shown to Dr Mead. Blood drawn during IV access, HL at present, blood labeled at bs and on hold pending any orders.

## 2017-09-29 NOTE — ED PROVIDER NOTES
"Encounter Date: 9/29/2017       History     Chief Complaint   Patient presents with    Shortness of Breath     PT c/o SOB since last night.  PT states, "I feel real anxious."  "I feel like I can't catch my breath."  PT took xanax this AM with minimal relief.  PT c/o nonproductive cough and congestion for the past few weeks.      Anxiety     The history is provided by the patient.   Shortness of Breath   This is a recurrent problem. The average episode lasts 6 hours. The current episode started 3 to 5 hours ago. The problem has not changed since onset.Associated symptoms include cough and PND. Pertinent negatives include no rhinorrhea, no sore throat, no ear pain, no neck pain, no sputum production, no hemoptysis, no wheezing, no orthopnea, no chest pain, no syncope and no vomiting. He has had prior hospitalizations. He has had prior ED visits. He has had no prior ICU admissions. Associated medical issues include heart failure.     Review of patient's allergies indicates:   Allergen Reactions    Antihistamines - alkylamine      Due to prostate     Sulfa (sulfonamide antibiotics) Hives     Past Medical History:   Diagnosis Date    Chronic combined systolic and diastolic congestive heart failure     Coronary artery disease     Diabetes mellitus, type II     Hyperlipidemia     Hypertension     Primary cardiomyopathy     Valvular regurgitation     m/s MR    Ventricular tachycardia      Past Surgical History:   Procedure Laterality Date    CARDIAC DEFIBRILLATOR PLACEMENT  10/2/2008; 1/5/2015    [employer]; converted to biventricular ICD VIVA QUAD XT CRT-D [id number]: Serial No. [id number]    CARDIAC DEFIBRILLATOR PLACEMENT      CORONARY ANGIOPLASTY WITH STENT PLACEMENT  4/15/2008; 10/16/2014    LAD; RCA    KNEE SURGERY      left knee     Family History   Problem Relation Age of Onset    Diabetes Maternal Aunt      Social History   Substance Use Topics    Smoking status: Never Smoker    Smokeless tobacco: " Never Used    Alcohol use No     Review of Systems   HENT: Negative for ear pain, rhinorrhea and sore throat.    Respiratory: Positive for cough and shortness of breath. Negative for hemoptysis, sputum production and wheezing.    Cardiovascular: Positive for PND. Negative for chest pain, orthopnea and syncope.   Gastrointestinal: Negative for vomiting.   Musculoskeletal: Negative for neck pain.   All other systems reviewed and are negative.      Physical Exam     Initial Vitals [09/29/17 1049]   BP Pulse Resp Temp SpO2   93/64 87 (!) 22 97.4 °F (36.3 °C) 100 %      MAP       73.67         Physical Exam    Nursing note and vitals reviewed.  Constitutional: He appears well-developed and well-nourished.   HENT:   Head: Normocephalic and atraumatic.   Eyes: EOM are normal.   Neck: Normal range of motion. Neck supple.   Cardiovascular: Normal rate, regular rhythm, normal heart sounds and intact distal pulses.   Pulmonary/Chest: Breath sounds normal.   Abdominal: Soft.   Musculoskeletal: Normal range of motion.   Neurological: He is alert and oriented to person, place, and time.   Skin: Skin is warm and dry. Capillary refill takes less than 2 seconds.   Psychiatric: He has a normal mood and affect. His behavior is normal. Judgment and thought content normal.         ED Course   Procedures  Labs Reviewed   CBC W/ AUTO DIFFERENTIAL - Abnormal; Notable for the following:        Result Value    WBC 3.89 (*)     RBC 3.61 (*)     Hemoglobin 11.2 (*)     Hematocrit 35.6 (*)     MCV 99 (*)     MCHC 31.5 (*)     Lymph # 0.9 (*)     Mono% 16.5 (*)     All other components within normal limits   COMPREHENSIVE METABOLIC PANEL - Abnormal; Notable for the following:     Chloride 112 (*)     CO2 21 (*)     Glucose 132 (*)     BUN, Bld 43 (*)     Creatinine 1.71 (*)     Total Bilirubin 1.1 (*)     Alkaline Phosphatase 135 (*)     AST 52 (*)     ALT 61 (*)     eGFR if  44.0 (*)     eGFR if non  38.0 (*)      All other components within normal limits   NT-PRO NATRIURETIC PEPTIDE - Abnormal; Notable for the following:     NT-proBNP 55639 (*)     All other components within normal limits   TROPONIN I     EKG Readings: (Independently Interpreted)   Initial Reading: No STEMI. Rhythm: Normal Sinus Rhythm. Heart Rate: 84. Ectopy: No Ectopy. Conduction: Normal. ST Segments: Normal ST Segments. T Waves: Normal. Clinical Impression: Normal Sinus Rhythm     ECG Results          EKG 12-lead (Final result)  Result time 09/29/17 13:52:25    Final result by Interface, Lab In [facility] (09/29/17 13:52:25)                 Narrative:    Test Reason : R06.02  Blood Pressure : 095/072 mmHG  Vent. Rate : 084 BPM     Atrial Rate : 084 BPM     P-R Int : 164 ms          QRS Dur : 132 ms      QT Int : 428 ms       P-R-T Axes : 073 -47 096 degrees     QTc Int : 505 ms    Normal sinus rhythm  Electronic ventricular pacemaker  Left atrial enlargement  Abnormal ECG  When compared with ECG of 18-AUG-2017 11:20,  No significant change was found    Confirmed by Costa Robles MD ([id number]) on 9/29/2017 1:52:18 PM    Referred By: [email or web address]   SELF           Confirmed By:Costa Robles MD                               Medical Decision Making:   Clinical Tests:   Lab Tests: Ordered and Reviewed  Radiological Study: Ordered and Reviewed  Medical Tests: Ordered and Reviewed  ED Management:  Patient's breathing is much improved after administration of Lasix                   ED Course      Clinical Impression:   The primary encounter diagnosis was Acute on chronic congestive heart failure, unspecified congestive heart failure type. A diagnosis of SOB (shortness of breath) was also pertinent to this visit.    Disposition:   Disposition: Discharged  Condition: Stable                        Lyn Mead MD  09/29/17 1506

## 2017-10-03 ENCOUNTER — HOSPITAL ENCOUNTER (EMERGENCY)
Facility: HOSPITAL | Age: 77
Discharge: HOME | End: 2017-10-03
Attending: FAMILY MEDICINE
Payer: MEDICARE

## 2017-10-03 VITALS
WEIGHT: 130 LBS | BODY MASS INDEX: 19.7 KG/M2 | HEART RATE: 72 BPM | DIASTOLIC BLOOD PRESSURE: 63 MMHG | OXYGEN SATURATION: 100 % | HEIGHT: 68 IN | SYSTOLIC BLOOD PRESSURE: 90 MMHG | TEMPERATURE: 97 F | RESPIRATION RATE: 19 BRPM

## 2017-10-03 DIAGNOSIS — I50.9 CHRONIC CONGESTIVE HEART FAILURE, UNSPECIFIED CONGESTIVE HEART FAILURE TYPE: Primary | ICD-10-CM

## 2017-10-03 DIAGNOSIS — R06.02 SOB (SHORTNESS OF BREATH): ICD-10-CM

## 2017-10-03 PROCEDURE — 93005 ELECTROCARDIOGRAM TRACING: CPT

## 2017-10-03 PROCEDURE — 93010 ELECTROCARDIOGRAM REPORT: CPT | Mod: ,,, | Performed by: INTERNAL MEDICINE

## 2017-10-03 PROCEDURE — 99283 EMERGENCY DEPT VISIT LOW MDM: CPT

## 2017-10-04 NOTE — ED PROVIDER NOTES
"Encounter Date: 10/3/2017       History     Chief Complaint   Patient presents with    Shortness of Breath     Pt states "I'm having breathing problems. It started 3 or 4 days ago and they gave me some Lasix but it started again today about 3:00." Denies chest pain. SpO2 97%.      77-year-old male with history of moderate to severe congestive heart failure with an ejection fraction 10-15% presents today with chief complaint of shortness of breath.  Patient reports that lasted 4 days ago evaluated several times in emergency room similar complaints.  Was advised to take Lasix twice daily.  Returns here today stating that starting about 3 PM started feeling short of breath again.  Reports feels back to his baseline at present.  Denies any chest pain.  Denies any nausea vomiting fever or chills.  Patient does have a history of anxiety as well for which she takes Xanax as needed.          Review of patient's allergies indicates:   Allergen Reactions    Antihistamines - alkylamine      Due to prostate     Sulfa (sulfonamide antibiotics) Hives     Past Medical History:   Diagnosis Date    Chronic combined systolic and diastolic congestive heart failure     Coronary artery disease     Diabetes mellitus, type II     Hyperlipidemia     Hypertension     Primary cardiomyopathy     Valvular regurgitation     m/s MR    Ventricular tachycardia      Past Surgical History:   Procedure Laterality Date    CARDIAC DEFIBRILLATOR PLACEMENT  10/2/2008; 1/5/2015    [employer]; converted to biventricular ICD VIVA QUAD XT CRT-D [id number]: Serial No. [id number]    CARDIAC DEFIBRILLATOR PLACEMENT      CORONARY ANGIOPLASTY WITH STENT PLACEMENT  4/15/2008; 10/16/2014    LAD; RCA    KNEE SURGERY      left knee     Family History   Problem Relation Age of Onset    Diabetes Maternal Aunt      Social History   Substance Use Topics    Smoking status: Never Smoker    Smokeless tobacco: Never Used    Alcohol use No     Review of Systems "   Respiratory: Positive for shortness of breath.    Cardiovascular: Negative for chest pain.   Psychiatric/Behavioral: The patient is nervous/anxious.    All other systems reviewed and are negative.      Physical Exam     Initial Vitals   BP Pulse Resp Temp SpO2   10/03/17 2121 10/03/17 2121 10/03/17 2121 10/03/17 2147 10/03/17 2121   104/70 88 20 97.6 °F (36.4 °C) 97 %      MAP       10/03/17 2121       81.33         Physical Exam    Nursing note and vitals reviewed.  Constitutional: He appears well-developed and well-nourished.   HENT:   Head: Normocephalic and atraumatic.   Eyes: EOM are normal.   Neck: Normal range of motion. Neck supple.   Cardiovascular: Normal rate and regular rhythm.   Murmur heard.  Pulmonary/Chest: Breath sounds normal. No respiratory distress. He has no wheezes. He has no rhonchi. He has no rales.   Abdominal: Soft.   Musculoskeletal: Normal range of motion.   Neurological: He is alert and oriented to person, place, and time.   Skin: Skin is warm. Capillary refill takes less than 2 seconds.   Psychiatric: He has a normal mood and affect. His behavior is normal.         ED Course   Procedures  Labs Reviewed - No data to display                            ED Course      Clinical Impression:   The primary encounter diagnosis was Chronic congestive heart failure, unspecified congestive heart failure type. A diagnosis of SOB (shortness of breath) was also pertinent to this visit.                           Jorden Melara MD  10/03/17 3861

## 2017-10-04 NOTE — ED TRIAGE NOTES
"Pt states "I'm having breathing problems. It started 3 or 4 days ago and they gave me some Lasix but it started again today about 3:00." Denies chest pain. SpO2 97%.   "

## 2017-10-05 ENCOUNTER — OFFICE VISIT (OUTPATIENT)
Dept: CARDIOLOGY | Facility: CLINIC | Age: 77
End: 2017-10-05
Payer: MEDICARE

## 2017-10-05 VITALS
HEIGHT: 68 IN | HEART RATE: 78 BPM | OXYGEN SATURATION: 97 % | SYSTOLIC BLOOD PRESSURE: 92 MMHG | DIASTOLIC BLOOD PRESSURE: 60 MMHG

## 2017-10-05 DIAGNOSIS — I25.5 ISCHEMIC DILATED CARDIOMYOPATHY: Chronic | ICD-10-CM

## 2017-10-05 DIAGNOSIS — N18.30 CHRONIC KIDNEY DISEASE, STAGE 3: Chronic | ICD-10-CM

## 2017-10-05 DIAGNOSIS — R00.2 PALPITATIONS: ICD-10-CM

## 2017-10-05 DIAGNOSIS — I10 ESSENTIAL HYPERTENSION: Chronic | ICD-10-CM

## 2017-10-05 DIAGNOSIS — I50.42 CHRONIC COMBINED SYSTOLIC AND DIASTOLIC CONGESTIVE HEART FAILURE: Primary | Chronic | ICD-10-CM

## 2017-10-05 DIAGNOSIS — N18.9 CHRONIC KIDNEY DISEASE, UNSPECIFIED CKD STAGE: ICD-10-CM

## 2017-10-05 DIAGNOSIS — Z98.61 CAD S/P PERCUTANEOUS CORONARY ANGIOPLASTY: Chronic | ICD-10-CM

## 2017-10-05 DIAGNOSIS — I42.0 ISCHEMIC DILATED CARDIOMYOPATHY: Chronic | ICD-10-CM

## 2017-10-05 DIAGNOSIS — Z95.810 AICD (AUTOMATIC CARDIOVERTER/DEFIBRILLATOR) PRESENT: Chronic | ICD-10-CM

## 2017-10-05 DIAGNOSIS — Z95.5 STATUS POST CORONARY ARTERY STENT PLACEMENT: ICD-10-CM

## 2017-10-05 DIAGNOSIS — K21.9 GASTROESOPHAGEAL REFLUX DISEASE, ESOPHAGITIS PRESENCE NOT SPECIFIED: Chronic | ICD-10-CM

## 2017-10-05 DIAGNOSIS — I34.0 MITRAL VALVE INSUFFICIENCY, UNSPECIFIED ETIOLOGY: Chronic | ICD-10-CM

## 2017-10-05 DIAGNOSIS — I47.20 VENTRICULAR TACHYARRHYTHMIA: ICD-10-CM

## 2017-10-05 DIAGNOSIS — I25.10 CAD S/P PERCUTANEOUS CORONARY ANGIOPLASTY: Chronic | ICD-10-CM

## 2017-10-05 PROCEDURE — 99999 PR PBB SHADOW E&M-EST. PATIENT-LVL III: CPT | Mod: PBBFAC,,, | Performed by: INTERNAL MEDICINE

## 2017-10-05 PROCEDURE — 99213 OFFICE O/P EST LOW 20 MIN: CPT | Mod: PBBFAC,PO | Performed by: INTERNAL MEDICINE

## 2017-10-05 PROCEDURE — 99214 OFFICE O/P EST MOD 30 MIN: CPT | Mod: S$PBB,,, | Performed by: INTERNAL MEDICINE

## 2017-10-05 RX ORDER — CARVEDILOL 12.5 MG/1
1 TABLET ORAL 2 TIMES DAILY
Refills: 3 | COMMUNITY
Start: 2017-08-07

## 2017-10-05 NOTE — PATIENT INSTRUCTIONS
Stop fosinopril  Start Entresto twice daily  Check bloodwork in 1 week  Follow up in 1 month with me

## 2017-10-05 NOTE — PROGRESS NOTES
Subjective:    Patient ID:  Trever Claros is a 77 y.o. male who presents for follow-up of Congestive Heart Failure      HPI  76 y/o male former pt of Dr. Oliva with hx of HTN, DM, HLP, CKD, CAD s/p PCI 2008 and 2014, HFrEF with ICM with functional MR with last EF 15% (down from 30% last year), s/p CRT-D (BiV upgrade Jan 2015 by Dr. Myers), hx of Vtach, GERD who presents for f/u. Last clinic visit was doing well with NYHA FC II symptoms. Since then he has had multiple hospitalizations for CHF and currently with NYHA FC III symptoms. He denies CP, PND, syncope, LE edema, claudication. He is compliant with his meds and has been limited in activity due to HF symptoms. Compliant with meds.      Review of Systems   Constitution: Positive for weakness and malaise/fatigue.   HENT: Negative for congestion.    Eyes: Negative for blurred vision.   Cardiovascular: Positive for dyspnea on exertion, orthopnea and palpitations. Negative for chest pain, claudication, cyanosis, irregular heartbeat, near-syncope, paroxysmal nocturnal dyspnea and syncope.   Respiratory: Positive for shortness of breath.    Endocrine: Positive for polyuria.   Hematologic/Lymphatic: Negative for bleeding problem.   Skin: Negative for itching and rash.   Musculoskeletal: Positive for back pain and joint pain. Negative for joint swelling, muscle cramps and muscle weakness.   Gastrointestinal: Negative for abdominal pain, hematemesis, hematochezia, melena, nausea and vomiting.   Genitourinary: Negative for dysuria and hematuria.   Neurological: Negative for dizziness, focal weakness, headaches, light-headedness and loss of balance.   Psychiatric/Behavioral: Negative for depression. The patient is not nervous/anxious.         Objective:    Physical Exam   Constitutional: He is oriented to person, place, and time. He appears well-developed and well-nourished.   HENT:   Head: Normocephalic and atraumatic.   Neck: Neck supple. No JVD present.    Cardiovascular: Normal rate and regular rhythm.    Murmur heard.  High-pitched blowing mid to late systolic murmur is present with a grade of 3/6  at the apex  Pulses:       Carotid pulses are 2+ on the right side, and 2+ on the left side.       Radial pulses are 2+ on the right side, and 2+ on the left side.        Femoral pulses are 2+ on the right side, and 2+ on the left side.       Dorsalis pedis pulses are 1+ on the right side, and 1+ on the left side.        Posterior tibial pulses are 1+ on the right side, and 1+ on the left side.   Pulmonary/Chest: Effort normal and breath sounds normal.   Abdominal: Soft. Bowel sounds are normal.   Musculoskeletal: He exhibits no edema.   Neurological: He is alert and oriented to person, place, and time.   Skin: Skin is warm and dry.   Psychiatric: He has a normal mood and affect. His behavior is normal. Thought content normal.         Assessment:       1. Chronic combined systolic and diastolic congestive heart failure    2. Chronic kidney disease, unspecified CKD stage    3. Palpitations    4. AICD (automatic cardioverter/defibrillator) present    5. CAD S/P percutaneous coronary angioplasty    6. Essential hypertension    7. Ischemic dilated cardiomyopathy    8. Mitral valve insufficiency, unspecified etiology    9. Status post coronary artery stent placement    10. Ventricular tachyarrhythmia    11. Chronic kidney disease, stage 3    12. Gastroesophageal reflux disease, esophagitis presence not specified        78 y/o male with hx and presentation as above. Clinically appears euvolemic and symptoms have improved but he does not feel at baseline and currently with NYHA FC III symptoms. Will continue with BID lasix for now and check BMP in 1 week. Will stop fosinopril and start Entresto BID.      Plan:       -Continue lasix BID  -Stop fosinopril  -Start Entresto BID  -BMP in 1 week  -F/u in 1 month

## 2017-10-09 ENCOUNTER — CLINICAL SUPPORT (OUTPATIENT)
Dept: ELECTROPHYSIOLOGY | Facility: CLINIC | Age: 77
End: 2017-10-09
Payer: MEDICARE

## 2017-10-09 ENCOUNTER — TELEPHONE (OUTPATIENT)
Dept: FAMILY MEDICINE | Facility: CLINIC | Age: 77
End: 2017-10-09

## 2017-10-09 DIAGNOSIS — Z95.810 ICD (IMPLANTABLE CARDIOVERTER-DEFIBRILLATOR) IN PLACE: ICD-10-CM

## 2017-10-09 DIAGNOSIS — I25.5 CARDIOMYOPATHY, ISCHEMIC: ICD-10-CM

## 2017-10-09 RX ORDER — BENZONATATE 200 MG/1
200 CAPSULE ORAL 3 TIMES DAILY PRN
Qty: 30 CAPSULE | Refills: 1 | Status: SHIPPED | OUTPATIENT
Start: 2017-10-09 | End: 2017-10-19

## 2017-10-09 NOTE — TELEPHONE ENCOUNTER
----- Message from Aziza Balderas sent at 10/9/2017  3:20 PM CDT -----  Contact: daughter Gayatri [phone]  Calling on father's behalf. Has had cough for little while. Doesn't seem to be leaving. Says cough syrup hurts his heart. Patient would prefer the cough pills instead. Not taking any OTC medications.    [facility]

## 2017-10-11 DIAGNOSIS — I25.5 ISCHEMIC DILATED CARDIOMYOPATHY: Primary | ICD-10-CM

## 2017-10-11 DIAGNOSIS — I42.0 ISCHEMIC DILATED CARDIOMYOPATHY: Primary | ICD-10-CM

## 2017-10-11 DIAGNOSIS — I42.9 CARDIOMYOPATHY, UNSPECIFIED TYPE: Primary | ICD-10-CM

## 2017-10-13 ENCOUNTER — LAB VISIT (OUTPATIENT)
Dept: LAB | Facility: HOSPITAL | Age: 77
End: 2017-10-13
Attending: INTERNAL MEDICINE
Payer: MEDICARE

## 2017-10-13 DIAGNOSIS — N18.9 CHRONIC KIDNEY DISEASE, UNSPECIFIED CKD STAGE: ICD-10-CM

## 2017-10-13 LAB
ANION GAP SERPL CALC-SCNC: 11 MMOL/L
BUN SERPL-MCNC: 41 MG/DL
CALCIUM SERPL-MCNC: 9.5 MG/DL
CHLORIDE SERPL-SCNC: 106 MMOL/L
CO2 SERPL-SCNC: 26 MMOL/L
CREAT SERPL-MCNC: 1.65 MG/DL
EST. GFR  (AFRICAN AMERICAN): 45.9 ML/MIN/1.73 M^2
EST. GFR  (NON AFRICAN AMERICAN): 39.7 ML/MIN/1.73 M^2
GLUCOSE SERPL-MCNC: 125 MG/DL
POTASSIUM SERPL-SCNC: 3.7 MMOL/L
SODIUM SERPL-SCNC: 143 MMOL/L

## 2017-10-13 PROCEDURE — 80048 BASIC METABOLIC PNL TOTAL CA: CPT | Mod: PO

## 2017-10-13 PROCEDURE — 36415 COLL VENOUS BLD VENIPUNCTURE: CPT | Mod: PO

## 2017-10-16 ENCOUNTER — TELEPHONE (OUTPATIENT)
Dept: CARDIOLOGY | Facility: CLINIC | Age: 77
End: 2017-10-16

## 2017-10-16 NOTE — TELEPHONE ENCOUNTER
----- Message from Charles Roe MD sent at 10/16/2017  9:03 AM CDT -----  Please let Mr Claros know that his bloodwork shows that his kidney function is stable and he is to continue with his medications as prescribed  Thanks  LEYDI

## 2017-10-21 ENCOUNTER — TELEPHONE (OUTPATIENT)
Dept: ELECTROPHYSIOLOGY | Facility: CLINIC | Age: 77
End: 2017-10-21

## 2017-10-21 NOTE — TELEPHONE ENCOUNTER
Spoke to pt's daughter about booking out carola's schedule on 10/31.  She verbalized understanding of moving her father's appt to Monday prior. Letters mailed out as well

## 2017-10-27 ENCOUNTER — TELEPHONE (OUTPATIENT)
Dept: ELECTROPHYSIOLOGY | Facility: CLINIC | Age: 77
End: 2017-10-27

## 2017-10-29 NOTE — PROGRESS NOTES
Subjective:    Patient ID:  Trever Claros is a 77 y.o. male who presents for an ICD Check.     Trever Claros is a patient of Dr. Myers.    HPI     77 y.o. M   CAD/PCI (none recent)  ICD 2008 (Dr Dukes). No hx ICD shock per pt.  CHF  DM  CRI    In January of 2015, Mr. Claros underwent a BiV ICD upgrade. Following the procedure, he has felt much better! Prior to the procedure, he experienced SOB/JESUS with brushing his teeth -- following the procedure, he was able to walk indefinitely without stopping. At his last office visit, he reported remaining active in his garden; he felt great. No JESUS.    Since his last office visit, Mr. Claros reports some JESUS with even normal activities; he has been seen on several occasions recently in the ED for this and fluid retention; some symptomatic improvement with recent diuretic medication adjustments. He has experienced some anxiety (his wife recently suffered a stroke); improvement w/Xanax. He did report experiencing some issues w/dizziness pre-syncope after being placed on Trigenta (in the setting of hypoglycemia); this occurred several months ago; since the medication was discontinued, no recurrence. Energy level is stable. Mr. Claros follows up w/Dr. Roe (Cardiology) next week.     Recent cardiac studies:  Echo (10/30/17) CONCLUSIONS:     1 - Severe left atrial enlargement.     2 - Enlarged left ventricular size.     3 - Moderately depressed left ventricular systolic function (EF 30-35%; up from 10-15%).     4 - Wall motion abnormalities.     5 - Quantitatively measured LV function is 34%.     6 - Right ventricular enlargement with normal systolic function.     7 - Restricted posterior leaflet mobility due to tethering.     8 - Moderate to severe mitral functional regurgitation.     9 - Mild to moderate tricuspid regurgitation.     10 - Pulmonary hypertension. The estimated PA systolic pressure is 74 mmHg.     Device Interrogation (10/09/17) reveals  stable lead and device function. No AMS noted; NSVT x 1 noted (2-second episode duration). He RA paces 61% and BiV paces 93% of the time. Estimated battery longevity >3 years.     I reviewed today's ECG which demonstrated a Dual A/BiV-paced rhythm at 62 bpm; , , and QTc 513.    Review of Systems   Constitution: Negative for diaphoresis and malaise/fatigue.   HENT:        Post-nasal drip   Eyes: Negative for double vision.   Cardiovascular: Positive for near-syncope. Negative for chest pain, dyspnea on exertion, irregular heartbeat, leg swelling, orthopnea, palpitations, paroxysmal nocturnal dyspnea and syncope.   Respiratory: Positive for cough. Negative for shortness of breath.    Skin: Negative.    Musculoskeletal: Positive for stiffness.   Neurological: Positive for light-headedness. Negative for dizziness.   Psychiatric/Behavioral: Negative for altered mental status.        Objective:    Physical Exam   Constitutional: He is oriented to person, place, and time. He appears well-developed and well-nourished.   HENT:   Head: Normocephalic and atraumatic.   Eyes: Pupils are equal, round, and reactive to light.   Cardiovascular: Normal rate, regular rhythm and normal heart sounds.    Pulmonary/Chest: Effort normal and breath sounds normal. He has no wheezes. He has no rales.   Musculoskeletal: He exhibits no edema.   Neurological: He is alert and oriented to person, place, and time.   Vitals reviewed.        Assessment:       1. Ischemic dilated cardiomyopathy    2. Chronic combined systolic and diastolic congestive heart failure    3. Ventricular tachycardia    4. AICD (automatic cardioverter/defibrillator) present    5. Syncope, unspecified syncope type    6. CAD S/P percutaneous coronary angioplasty    7. Mitral valve insufficiency, unspecified etiology    8. Essential hypertension    9. Chronic kidney disease, stage 3    10. Diabetes mellitus type 2, noninsulin dependent         Plan:         Harlan is doing well from a device perspective with stable lead and device function without sustained arrhythmia noted; EF currently 30-35% (up from 10-15%; 08/10/16) s/p CRT-D. PASP currently 73.61 mmHg (up from 21.84 mmHg last year).     Continue current medication regimen and device settings.   Follow up with Dr. Roe as scheduled.   Follow up with quarterly remote device interrogations.   Follow up in EP clinic in 1 year with Dr. Myers and a repeat Echo, sooner as needed.     JOSE Chacko, APRN, FNP-C          (A copy of today's note was sent to Ronny Roe and Lucia.)

## 2017-10-30 ENCOUNTER — HOSPITAL ENCOUNTER (OUTPATIENT)
Dept: CARDIOLOGY | Facility: CLINIC | Age: 77
Discharge: HOME | End: 2017-10-30
Payer: MEDICARE

## 2017-10-30 ENCOUNTER — OFFICE VISIT (OUTPATIENT)
Dept: ELECTROPHYSIOLOGY | Facility: CLINIC | Age: 77
End: 2017-10-30
Payer: MEDICARE

## 2017-10-30 VITALS
SYSTOLIC BLOOD PRESSURE: 104 MMHG | HEART RATE: 62 BPM | DIASTOLIC BLOOD PRESSURE: 58 MMHG | HEIGHT: 68 IN | WEIGHT: 141.31 LBS | BODY MASS INDEX: 21.42 KG/M2

## 2017-10-30 DIAGNOSIS — I47.20 VENTRICULAR TACHYCARDIA: ICD-10-CM

## 2017-10-30 DIAGNOSIS — I42.0 ISCHEMIC DILATED CARDIOMYOPATHY: ICD-10-CM

## 2017-10-30 DIAGNOSIS — R55 SYNCOPE, UNSPECIFIED SYNCOPE TYPE: ICD-10-CM

## 2017-10-30 DIAGNOSIS — Z98.61 CAD S/P PERCUTANEOUS CORONARY ANGIOPLASTY: Chronic | ICD-10-CM

## 2017-10-30 DIAGNOSIS — I50.42 CHRONIC COMBINED SYSTOLIC AND DIASTOLIC CONGESTIVE HEART FAILURE: Chronic | ICD-10-CM

## 2017-10-30 DIAGNOSIS — I10 ESSENTIAL HYPERTENSION: Chronic | ICD-10-CM

## 2017-10-30 DIAGNOSIS — I42.0 ISCHEMIC DILATED CARDIOMYOPATHY: Primary | Chronic | ICD-10-CM

## 2017-10-30 DIAGNOSIS — I25.5 ISCHEMIC DILATED CARDIOMYOPATHY: Primary | Chronic | ICD-10-CM

## 2017-10-30 DIAGNOSIS — I25.5 ISCHEMIC DILATED CARDIOMYOPATHY: ICD-10-CM

## 2017-10-30 DIAGNOSIS — I42.9 CARDIOMYOPATHY, UNSPECIFIED TYPE: ICD-10-CM

## 2017-10-30 DIAGNOSIS — I25.10 CAD S/P PERCUTANEOUS CORONARY ANGIOPLASTY: Chronic | ICD-10-CM

## 2017-10-30 DIAGNOSIS — E11.9 DIABETES MELLITUS TYPE 2, NONINSULIN DEPENDENT: Chronic | ICD-10-CM

## 2017-10-30 DIAGNOSIS — Z95.810 AICD (AUTOMATIC CARDIOVERTER/DEFIBRILLATOR) PRESENT: Chronic | ICD-10-CM

## 2017-10-30 DIAGNOSIS — N18.30 CHRONIC KIDNEY DISEASE, STAGE 3: Chronic | ICD-10-CM

## 2017-10-30 DIAGNOSIS — I34.0 MITRAL VALVE INSUFFICIENCY, UNSPECIFIED ETIOLOGY: Chronic | ICD-10-CM

## 2017-10-30 LAB
ESTIMATED PA SYSTOLIC PRESSURE: 73.61
MITRAL VALVE MOBILITY: ABNORMAL
MITRAL VALVE REGURGITATION: ABNORMAL
RETIRED EF AND QEF - SEE NOTES: 30 (ref 55–65)
TRICUSPID VALVE REGURGITATION: ABNORMAL

## 2017-10-30 PROCEDURE — 93010 ELECTROCARDIOGRAM REPORT: CPT | Mod: S$PBB,,, | Performed by: INTERNAL MEDICINE

## 2017-10-30 PROCEDURE — 93306 TTE W/DOPPLER COMPLETE: CPT | Mod: PBBFAC | Performed by: INTERNAL MEDICINE

## 2017-10-30 PROCEDURE — 99999 PR PBB SHADOW E&M-EST. PATIENT-LVL III: CPT | Mod: PBBFAC,,, | Performed by: NURSE PRACTITIONER

## 2017-10-30 PROCEDURE — 93005 ELECTROCARDIOGRAM TRACING: CPT | Mod: PBBFAC | Performed by: INTERNAL MEDICINE

## 2017-10-30 PROCEDURE — 99214 OFFICE O/P EST MOD 30 MIN: CPT | Mod: S$PBB,,, | Performed by: NURSE PRACTITIONER

## 2017-10-30 PROCEDURE — 99213 OFFICE O/P EST LOW 20 MIN: CPT | Mod: PBBFAC | Performed by: NURSE PRACTITIONER

## 2017-11-05 RX ORDER — LINAGLIPTIN 5 MG/1
TABLET, FILM COATED ORAL
Qty: 90 TABLET | Refills: 3 | Status: SHIPPED | OUTPATIENT
Start: 2017-11-05 | End: 2017-12-04

## 2017-11-09 ENCOUNTER — OFFICE VISIT (OUTPATIENT)
Dept: CARDIOLOGY | Facility: CLINIC | Age: 77
End: 2017-11-09
Payer: MEDICARE

## 2017-11-09 VITALS
BODY MASS INDEX: 21.52 KG/M2 | SYSTOLIC BLOOD PRESSURE: 107 MMHG | HEIGHT: 68 IN | HEART RATE: 90 BPM | OXYGEN SATURATION: 98 % | WEIGHT: 142 LBS | DIASTOLIC BLOOD PRESSURE: 68 MMHG

## 2017-11-09 DIAGNOSIS — I47.20 VENTRICULAR TACHYARRHYTHMIA: ICD-10-CM

## 2017-11-09 DIAGNOSIS — Z98.61 CAD S/P PERCUTANEOUS CORONARY ANGIOPLASTY: Chronic | ICD-10-CM

## 2017-11-09 DIAGNOSIS — I25.10 CAD S/P PERCUTANEOUS CORONARY ANGIOPLASTY: Chronic | ICD-10-CM

## 2017-11-09 DIAGNOSIS — E11.9 DIABETES MELLITUS TYPE 2, NONINSULIN DEPENDENT: Chronic | ICD-10-CM

## 2017-11-09 DIAGNOSIS — N18.30 CHRONIC KIDNEY DISEASE, STAGE 3: Chronic | ICD-10-CM

## 2017-11-09 DIAGNOSIS — I34.0 MITRAL VALVE INSUFFICIENCY, UNSPECIFIED ETIOLOGY: Chronic | ICD-10-CM

## 2017-11-09 DIAGNOSIS — I25.5 ISCHEMIC DILATED CARDIOMYOPATHY: Chronic | ICD-10-CM

## 2017-11-09 DIAGNOSIS — I10 ESSENTIAL HYPERTENSION: Chronic | ICD-10-CM

## 2017-11-09 DIAGNOSIS — I42.0 ISCHEMIC DILATED CARDIOMYOPATHY: Chronic | ICD-10-CM

## 2017-11-09 DIAGNOSIS — I50.42 CHRONIC COMBINED SYSTOLIC AND DIASTOLIC CONGESTIVE HEART FAILURE: Primary | Chronic | ICD-10-CM

## 2017-11-09 DIAGNOSIS — Z95.5 STATUS POST CORONARY ARTERY STENT PLACEMENT: ICD-10-CM

## 2017-11-09 DIAGNOSIS — K21.9 GASTROESOPHAGEAL REFLUX DISEASE, ESOPHAGITIS PRESENCE NOT SPECIFIED: Chronic | ICD-10-CM

## 2017-11-09 DIAGNOSIS — Z95.810 AICD (AUTOMATIC CARDIOVERTER/DEFIBRILLATOR) PRESENT: Chronic | ICD-10-CM

## 2017-11-09 PROCEDURE — 99213 OFFICE O/P EST LOW 20 MIN: CPT | Mod: PBBFAC,PO | Performed by: INTERNAL MEDICINE

## 2017-11-09 PROCEDURE — 99999 PR PBB SHADOW E&M-EST. PATIENT-LVL III: CPT | Mod: PBBFAC,,, | Performed by: INTERNAL MEDICINE

## 2017-11-09 PROCEDURE — 99214 OFFICE O/P EST MOD 30 MIN: CPT | Mod: S$PBB,,, | Performed by: INTERNAL MEDICINE

## 2017-11-09 RX ORDER — FUROSEMIDE 20 MG/1
40 TABLET ORAL EVERY 12 HOURS
Qty: 360 TABLET | Refills: 3 | Status: SHIPPED | OUTPATIENT
Start: 2017-11-09 | End: 2018-11-09

## 2017-11-09 NOTE — PROGRESS NOTES
Subjective:    Patient ID:  Trever Claros is a 77 y.o. male who presents for follow-up of Palpitations and Shortness of Breath      HPI  76 y/o male former pt of Dr. Oliva with hx of HTN, DM, HLP, CKD, CAD s/p PCI 2008 and 2014, HFrEF with ICM with functional MR with last EF 30% (improved from 10%), s/p CRT-D (BiV upgrade Jan 2015 by Dr. Myers), hx of Vtach, moderate to severe MR per last 2DE, GERD who presents for f/u.   Last clinic visit was seen after multiple admissions for ADHF and was started on Entresto. Continues with similar symptoms with NYHA FC III symptoms. He denies CP, PND, syncope, LE edema, claudication. He is compliant with his meds and has been limited in activity due to HF symptoms. He states that he is able to work in his garden and walk a few blocks. It sounds like his symptoms are provoked by palpitations. Compliant with meds.     Review of Systems   Constitution: Positive for malaise/fatigue. Negative for weakness.   HENT: Negative for congestion.    Eyes: Negative for blurred vision.   Cardiovascular: Positive for dyspnea on exertion and palpitations. Negative for chest pain, claudication, cyanosis, irregular heartbeat, leg swelling, near-syncope, orthopnea, paroxysmal nocturnal dyspnea and syncope.   Respiratory: Negative for shortness of breath.    Endocrine: Negative for polyuria.   Hematologic/Lymphatic: Negative for bleeding problem.   Skin: Negative for itching and rash.   Musculoskeletal: Negative for joint swelling, muscle cramps and muscle weakness.   Gastrointestinal: Negative for abdominal pain, hematemesis, hematochezia, melena, nausea and vomiting.   Genitourinary: Negative for dysuria and hematuria.   Neurological: Negative for dizziness, focal weakness, headaches, light-headedness and loss of balance.   Psychiatric/Behavioral: Negative for depression. The patient is not nervous/anxious.         Objective:    Physical Exam   Constitutional: He is oriented to person, place,  and time. He appears well-developed and well-nourished.   HENT:   Head: Normocephalic and atraumatic.   Neck: Neck supple. JVD present.   Cardiovascular: Normal rate and regular rhythm.    Murmur heard.   Systolic murmur is present with a grade of 3/6   Pulses:       Carotid pulses are 2+ on the right side, and 2+ on the left side.       Radial pulses are 2+ on the right side, and 2+ on the left side.        Femoral pulses are 2+ on the right side, and 2+ on the left side.       Dorsalis pedis pulses are 2+ on the right side, and 2+ on the left side.        Posterior tibial pulses are 2+ on the right side, and 2+ on the left side.   Pulmonary/Chest: Effort normal and breath sounds normal.   Abdominal: Soft. Bowel sounds are normal.   Musculoskeletal: He exhibits no edema.   Neurological: He is alert and oriented to person, place, and time.   Skin: Skin is warm and dry.   Psychiatric: He has a normal mood and affect. His behavior is normal. Thought content normal.         Assessment:       1. Chronic combined systolic and diastolic congestive heart failure    2. AICD (automatic cardioverter/defibrillator) present    3. CAD S/P percutaneous coronary angioplasty    4. Essential hypertension    5. Ischemic dilated cardiomyopathy    6. Mitral valve insufficiency, unspecified etiology    7. Status post coronary artery stent placement    8. Ventricular tachyarrhythmia    9. Chronic kidney disease, stage 3    10. Diabetes mellitus type 2, noninsulin dependent    11. Gastroesophageal reflux disease, esophagitis presence not specified      78 y/o male with hx and presentation as above. Currently with NYHA FC III symptoms. Recent 2DE with improvement in EF, however, with moderate to severe MR and PASP >70. Clinically he is close to euvolemia, however, will try to optimize CHF with increase in lasix temporarily and will check BMP on Monday. Will increase Entresto and assess response. Will next attempt to increase BB if BP  allows. If symptoms not improved will pursue more invasive means regarding MR (i.e. Mitraclip).        Plan:       -Increase lasix to 40 mg BID  -Increase Entresto to 97/103  -BMP on Monday  -f/u in 2 weeks

## 2017-11-13 ENCOUNTER — TELEPHONE (OUTPATIENT)
Dept: CARDIOLOGY | Facility: CLINIC | Age: 77
End: 2017-11-13

## 2017-11-13 ENCOUNTER — LAB VISIT (OUTPATIENT)
Dept: LAB | Facility: HOSPITAL | Age: 77
End: 2017-11-13
Attending: INTERNAL MEDICINE
Payer: MEDICARE

## 2017-11-13 DIAGNOSIS — N18.30 CHRONIC KIDNEY DISEASE, STAGE 3: Chronic | ICD-10-CM

## 2017-11-13 DIAGNOSIS — N18.9 CHRONIC KIDNEY DISEASE, UNSPECIFIED CKD STAGE: Primary | ICD-10-CM

## 2017-11-13 DIAGNOSIS — I50.42 CHRONIC COMBINED SYSTOLIC AND DIASTOLIC CONGESTIVE HEART FAILURE: Chronic | ICD-10-CM

## 2017-11-13 LAB
ANION GAP SERPL CALC-SCNC: 11 MMOL/L
BUN SERPL-MCNC: 71 MG/DL
CALCIUM SERPL-MCNC: 9.2 MG/DL
CHLORIDE SERPL-SCNC: 110 MMOL/L
CO2 SERPL-SCNC: 23 MMOL/L
CREAT SERPL-MCNC: 2.16 MG/DL
EST. GFR  (AFRICAN AMERICAN): 33.2 ML/MIN/1.73 M^2
EST. GFR  (NON AFRICAN AMERICAN): 28.7 ML/MIN/1.73 M^2
GLUCOSE SERPL-MCNC: 120 MG/DL
POTASSIUM SERPL-SCNC: 3.6 MMOL/L
SODIUM SERPL-SCNC: 144 MMOL/L

## 2017-11-13 PROCEDURE — 36415 COLL VENOUS BLD VENIPUNCTURE: CPT | Mod: PO

## 2017-11-13 PROCEDURE — 80048 BASIC METABOLIC PNL TOTAL CA: CPT | Mod: PO

## 2017-11-13 NOTE — TELEPHONE ENCOUNTER
----- Message from Charles Roe MD sent at 11/13/2017 12:38 PM CST -----  Please let Mr Harlan know that his kidney function is mildly worsened and I would like for him to hold lasix for the until Friday. Will repeat labs on Friday to check kidney function  Thanks  LEYDI

## 2017-11-15 ENCOUNTER — HOSPITAL ENCOUNTER (EMERGENCY)
Facility: HOSPITAL | Age: 77
Discharge: HOME | End: 2017-11-15
Attending: EMERGENCY MEDICINE
Payer: MEDICARE

## 2017-11-15 ENCOUNTER — TELEPHONE (OUTPATIENT)
Dept: FAMILY MEDICINE | Facility: CLINIC | Age: 77
End: 2017-11-15

## 2017-11-15 VITALS
OXYGEN SATURATION: 100 % | RESPIRATION RATE: 19 BRPM | SYSTOLIC BLOOD PRESSURE: 95 MMHG | TEMPERATURE: 98 F | HEART RATE: 63 BPM | DIASTOLIC BLOOD PRESSURE: 60 MMHG

## 2017-11-15 DIAGNOSIS — R06.02 SHORTNESS OF BREATH: ICD-10-CM

## 2017-11-15 DIAGNOSIS — I50.42 CHRONIC COMBINED SYSTOLIC AND DIASTOLIC CONGESTIVE HEART FAILURE: Primary | ICD-10-CM

## 2017-11-15 LAB
ALBUMIN SERPL BCP-MCNC: 3.8 G/DL
ALP SERPL-CCNC: 104 U/L
ALT SERPL W/O P-5'-P-CCNC: 32 U/L
ANION GAP SERPL CALC-SCNC: 14 MMOL/L
AST SERPL-CCNC: 28 U/L
BASOPHILS # BLD AUTO: 0.02 K/UL
BASOPHILS NFR BLD: 0.5 %
BILIRUB SERPL-MCNC: 1.5 MG/DL
BUN SERPL-MCNC: 71 MG/DL
CALCIUM SERPL-MCNC: 9.2 MG/DL
CHLORIDE SERPL-SCNC: 109 MMOL/L
CO2 SERPL-SCNC: 21 MMOL/L
CREAT SERPL-MCNC: 2.24 MG/DL
DIFFERENTIAL METHOD: ABNORMAL
EOSINOPHIL # BLD AUTO: 0.1 K/UL
EOSINOPHIL NFR BLD: 3.2 %
ERYTHROCYTE [DISTWIDTH] IN BLOOD BY AUTOMATED COUNT: 15 %
EST. GFR  (AFRICAN AMERICAN): 31.7 ML/MIN/1.73 M^2
EST. GFR  (NON AFRICAN AMERICAN): 27.5 ML/MIN/1.73 M^2
GLUCOSE SERPL-MCNC: 201 MG/DL
HCT VFR BLD AUTO: 36.8 %
HGB BLD-MCNC: 11.4 G/DL
INR PPP: 1.2
LYMPHOCYTES # BLD AUTO: 1.2 K/UL
LYMPHOCYTES NFR BLD: 30.3 %
MCH RBC QN AUTO: 29.9 PG
MCHC RBC AUTO-ENTMCNC: 31 G/DL
MCV RBC AUTO: 97 FL
MONOCYTES # BLD AUTO: 0.6 K/UL
MONOCYTES NFR BLD: 15.4 %
NEUTROPHILS # BLD AUTO: 2 K/UL
NEUTROPHILS NFR BLD: 50.4 %
NT-PROBNP: ABNORMAL PG/ML
PLATELET # BLD AUTO: 115 K/UL
PMV BLD AUTO: 11.3 FL
POTASSIUM SERPL-SCNC: 4.5 MMOL/L
PROT SERPL-MCNC: 6.7 G/DL
PROTHROMBIN TIME: 13 SEC
RBC # BLD AUTO: 3.81 M/UL
SODIUM SERPL-SCNC: 144 MMOL/L
TROPONIN I SERPL DL<=0.01 NG/ML-MCNC: 0.04 NG/ML
WBC # BLD AUTO: 4.03 K/UL

## 2017-11-15 PROCEDURE — 94760 N-INVAS EAR/PLS OXIMETRY 1: CPT

## 2017-11-15 PROCEDURE — 93010 ELECTROCARDIOGRAM REPORT: CPT | Mod: ,,, | Performed by: INTERNAL MEDICINE

## 2017-11-15 PROCEDURE — 85025 COMPLETE CBC W/AUTO DIFF WBC: CPT

## 2017-11-15 PROCEDURE — 93005 ELECTROCARDIOGRAM TRACING: CPT

## 2017-11-15 PROCEDURE — 99284 EMERGENCY DEPT VISIT MOD MDM: CPT

## 2017-11-15 PROCEDURE — 85610 PROTHROMBIN TIME: CPT

## 2017-11-15 PROCEDURE — 84484 ASSAY OF TROPONIN QUANT: CPT

## 2017-11-15 PROCEDURE — 27000221 HC OXYGEN, UP TO 24 HOURS

## 2017-11-15 PROCEDURE — 83880 ASSAY OF NATRIURETIC PEPTIDE: CPT

## 2017-11-15 PROCEDURE — 80053 COMPREHEN METABOLIC PANEL: CPT

## 2017-11-15 NOTE — TELEPHONE ENCOUNTER
----- Message from Aziza Balderas sent at 11/15/2017  2:57 PM CST -----  Contact: daughter Gayatri [phone]  Daughter calling on pt's behalf. Needs refill on cough syrup. Still has cough    [facility]

## 2017-11-16 ENCOUNTER — PATIENT MESSAGE (OUTPATIENT)
Dept: CARDIOLOGY | Facility: CLINIC | Age: 77
End: 2017-11-16

## 2017-11-16 ENCOUNTER — OFFICE VISIT (OUTPATIENT)
Dept: FAMILY MEDICINE | Facility: CLINIC | Age: 77
End: 2017-11-16
Payer: MEDICARE

## 2017-11-16 VITALS
BODY MASS INDEX: 21.37 KG/M2 | DIASTOLIC BLOOD PRESSURE: 58 MMHG | WEIGHT: 141 LBS | HEART RATE: 66 BPM | SYSTOLIC BLOOD PRESSURE: 90 MMHG | TEMPERATURE: 98 F | OXYGEN SATURATION: 100 % | HEIGHT: 68 IN

## 2017-11-16 DIAGNOSIS — I47.20 VENTRICULAR TACHYARRHYTHMIA: ICD-10-CM

## 2017-11-16 DIAGNOSIS — I25.5 ISCHEMIC DILATED CARDIOMYOPATHY: Chronic | ICD-10-CM

## 2017-11-16 DIAGNOSIS — J45.909 BRONCHITIS, ALLERGIC, UNSPECIFIED ASTHMA SEVERITY, UNCOMPLICATED: ICD-10-CM

## 2017-11-16 DIAGNOSIS — I42.0 ISCHEMIC DILATED CARDIOMYOPATHY: Chronic | ICD-10-CM

## 2017-11-16 DIAGNOSIS — Z95.5 STATUS POST CORONARY ARTERY STENT PLACEMENT: ICD-10-CM

## 2017-11-16 DIAGNOSIS — R05.9 COUGH: Primary | ICD-10-CM

## 2017-11-16 DIAGNOSIS — N18.30 CHRONIC KIDNEY DISEASE, STAGE 3: Chronic | ICD-10-CM

## 2017-11-16 DIAGNOSIS — K21.00 GASTROESOPHAGEAL REFLUX DISEASE WITH ESOPHAGITIS: ICD-10-CM

## 2017-11-16 DIAGNOSIS — I34.0 MITRAL VALVE INSUFFICIENCY, UNSPECIFIED ETIOLOGY: Chronic | ICD-10-CM

## 2017-11-16 DIAGNOSIS — K21.9 GASTROESOPHAGEAL REFLUX DISEASE, ESOPHAGITIS PRESENCE NOT SPECIFIED: Chronic | ICD-10-CM

## 2017-11-16 PROCEDURE — 99214 OFFICE O/P EST MOD 30 MIN: CPT | Mod: S$GLB,,, | Performed by: FAMILY MEDICINE

## 2017-11-16 RX ORDER — OMEPRAZOLE 20 MG/1
20 CAPSULE, DELAYED RELEASE ORAL 2 TIMES DAILY
Qty: 180 CAPSULE | Refills: 3 | Status: SHIPPED | OUTPATIENT
Start: 2017-11-16 | End: 2018-11-16

## 2017-11-16 RX ORDER — OMEPRAZOLE 20 MG/1
20 CAPSULE, DELAYED RELEASE ORAL DAILY
Qty: 30 CAPSULE | Refills: 11 | Status: SHIPPED | OUTPATIENT
Start: 2017-11-16 | End: 2017-11-16 | Stop reason: SDUPTHER

## 2017-11-16 RX ORDER — BENZONATATE 200 MG/1
200 CAPSULE ORAL 4 TIMES DAILY PRN
Qty: 100 CAPSULE | Refills: 2 | Status: SHIPPED | OUTPATIENT
Start: 2017-11-16 | End: 2017-11-26

## 2017-11-16 RX ORDER — FLUTICASONE PROPIONATE AND SALMETEROL 250; 50 UG/1; UG/1
1 POWDER RESPIRATORY (INHALATION) 2 TIMES DAILY
Qty: 1 EACH | Refills: 3 | Status: SHIPPED | OUTPATIENT
Start: 2017-11-16 | End: 2018-11-16

## 2017-11-16 RX ORDER — OMEPRAZOLE 20 MG/1
20 CAPSULE, DELAYED RELEASE ORAL DAILY
Qty: 30 CAPSULE | Refills: 11 | Status: SHIPPED | OUTPATIENT
Start: 2017-11-16 | End: 2017-11-16

## 2017-11-16 NOTE — ED TRIAGE NOTES
"Cough and SOB x 10 days, worsening today; Denies CP or discomfort; Reports hx of CHF and was recently told "I have a lot of fluid, that's why I'm coughing"; no lower extremity edema noted.    "

## 2017-11-16 NOTE — TELEPHONE ENCOUNTER
Per patient daughter conversation with you at office visit today  - please send in rx for cough meds ( pills ) and Advair and Prilosec 90 day supply of each    [facility]

## 2017-11-16 NOTE — PROGRESS NOTES
Subjective:      Patient ID: Trever Claros is a 77 y.o. male.    Chief Complaint: Shortness of Breath    Pt called for cough med again and I said it was probavbly fluid of CHF and pt went to ER last night CXR, labs and EKG; Pt has CHF, last ECHO 30 EF, MR, TR, Pulmonary hypertension.  Main complaint is coughing; breathing decent; no evidence of infection, no pain; is on lasix 40 mg bid for 2 days; before 20 bid; hoarse today and for a year; has been scoped by Jim and Beztaida; didbn 't see anything; of fosinopril since October 5 and started Entresto;       Shortness of Breath       Review of Systems   HENT: Positive for voice change.    Respiratory: Positive for cough and shortness of breath.    All other systems reviewed and are negative.    Objective:     Physical Exam   Constitutional: He is oriented to person, place, and time. He appears well-developed and well-nourished. No distress.   HENT:   Head: Normocephalic and atraumatic.   Right Ear: External ear normal.   Left Ear: External ear normal.   Mouth/Throat: Oropharynx is clear and moist. No oropharyngeal exudate.   hoarse   Eyes: Conjunctivae and EOM are normal. Pupils are equal, round, and reactive to light. Right eye exhibits no discharge. Left eye exhibits no discharge. No scleral icterus.   Neck: Normal range of motion. Neck supple. No JVD present. No tracheal deviation present. No thyromegaly present.   Cardiovascular: Normal rate and regular rhythm.  Exam reveals no gallop and no friction rub.    No murmur heard.  Pulmonary/Chest: Effort normal and breath sounds normal. No stridor. No respiratory distress. He has no wheezes. He has no rales. He exhibits no tenderness.   Abdominal: Soft. He exhibits no distension and no mass. There is no tenderness. There is no rebound and no guarding.   Musculoskeletal: Normal range of motion. He exhibits no edema or tenderness.   Lymphadenopathy:     He has no cervical adenopathy.   Neurological: He is alert  and oriented to person, place, and time. He has normal reflexes. He displays normal reflexes. No cranial nerve deficit. He exhibits normal muscle tone. Coordination normal.   Skin: Skin is warm and dry. No rash noted. He is not diaphoretic. No erythema. No pallor.   Psychiatric: He has a normal mood and affect. His behavior is normal. Judgment and thought content normal.   Nursing note and vitals reviewed.    Assessment:     1. Cough    2. Mitral valve insufficiency, unspecified etiology    3. Ischemic dilated cardiomyopathy    4. Ventricular tachyarrhythmia    5. Status post coronary artery stent placement    6. Gastroesophageal reflux disease, esophagitis presence not specified    7. Chronic kidney disease, stage 3    8. Gastroesophageal reflux disease with esophagitis    9. Bronchitis, allergic, unspecified asthma severity, uncomplicated      Plan:     New Prescriptions    BENZONATATE (TESSALON) 200 MG CAPSULE    Take 1 capsule (200 mg total) by mouth 4 (four) times daily as needed for Cough.    FLUTICASONE-SALMETEROL 250-50 MCG/DOSE (ADVAIR) 250-50 MCG/DOSE DISKUS INHALER    Inhale 1 puff into the lungs 2 (two) times daily. Controller    OMEPRAZOLE (PRILOSEC) 20 MG CAPSULE    Take 1 capsule (20 mg total) by mouth 2 (two) times daily.     Discontinued Medications    RANITIDINE (ZANTAC) 300 MG CAPSULE    TAKE 1 CAPSULE (300 MG TOTAL) BY MOUTH EVERY EVENING.     Modified Medications    No medications on file       Cough    Mitral valve insufficiency, unspecified etiology    Ischemic dilated cardiomyopathy    Ventricular tachyarrhythmia    Status post coronary artery stent placement    Gastroesophageal reflux disease, esophagitis presence not specified    Chronic kidney disease, stage 3    Gastroesophageal reflux disease with esophagitis  -     Discontinue: ranitidine (ZANTAC) 300 MG capsule; Take 1 capsule (300 mg total) by mouth every evening.  Dispense: 90 capsule; Refill: 2    Bronchitis, allergic, unspecified  asthma severity, uncomplicated    Other orders  -     benzonatate (TESSALON) 200 MG capsule; Take 1 capsule (200 mg total) by mouth 4 (four) times daily as needed for Cough.  Dispense: 100 capsule; Refill: 2  -     Discontinue: omeprazole (PRILOSEC) 20 MG capsule; Take 1 capsule (20 mg total) by mouth once daily.  Dispense: 30 capsule; Refill: 11  -     Discontinue: omeprazole (PRILOSEC) 20 MG capsule; Take 1 capsule (20 mg total) by mouth once daily.  Dispense: 30 capsule; Refill: 11  -     omeprazole (PRILOSEC) 20 MG capsule; Take 1 capsule (20 mg total) by mouth 2 (two) times daily.  Dispense: 180 capsule; Refill: 3  -     fluticasone-salmeterol 250-50 mcg/dose (ADVAIR) 250-50 mcg/dose diskus inhaler; Inhale 1 puff into the lungs 2 (two) times daily. Controller  Dispense: 1 each; Refill: 3      Cough could be CHF, allergic bronhcitis ro GERD; off ACE; on enestro; had CKD and BUN/cr went up with lasix, but it helps his breathing and cough with larger diuretic, so will monitor renal function. I think pt will need to be quite dry to help his cough and SOB;

## 2017-11-16 NOTE — ED PROVIDER NOTES
Encounter Date: 11/15/2017       History     Chief Complaint   Patient presents with    Shortness of Breath     SOB and cough x 1 week, worsening today; reports hx of CHF     The history is provided by the patient.   Cough   This is a chronic problem. The current episode started several days ago. The problem occurs every few hours. The problem has been gradually improving. The cough is non-productive. There has been no fever. Pertinent negatives include no chest pain, no chills, no sweats, no weight loss, no headaches, no rhinorrhea, no sore throat, no myalgias, no shortness of breath and no wheezing. He has tried nothing for the symptoms. He is not a smoker. Past medical history comments: CHF.     Review of patient's allergies indicates:   Allergen Reactions    Antihistamines - alkylamine      Due to prostate     Sulfa (sulfonamide antibiotics) Hives     Past Medical History:   Diagnosis Date    Chronic combined systolic and diastolic congestive heart failure     Coronary artery disease     Diabetes mellitus, type II     Hyperlipidemia     Hypertension     Primary cardiomyopathy     Valvular regurgitation     m/s MR    Ventricular tachycardia      Past Surgical History:   Procedure Laterality Date    CARDIAC DEFIBRILLATOR PLACEMENT  10/2/2008; 1/5/2015    [employer]; converted to biventricular ICD VIVA QUAD XT CRT-D [id number]: Serial No. [id number]    CARDIAC DEFIBRILLATOR PLACEMENT      CORONARY ANGIOPLASTY WITH STENT PLACEMENT  4/15/2008; 10/16/2014    LAD; RCA    KNEE SURGERY      left knee     Family History   Problem Relation Age of Onset    Diabetes Maternal Aunt      Social History   Substance Use Topics    Smoking status: Never Smoker    Smokeless tobacco: Never Used    Alcohol use No     Review of Systems   Constitutional: Negative for chills and weight loss.   HENT: Negative for rhinorrhea and sore throat.    Respiratory: Positive for cough. Negative for shortness of breath and wheezing.     Cardiovascular: Negative for chest pain.   Musculoskeletal: Negative for myalgias.   Neurological: Negative for headaches.   All other systems reviewed and are negative.      Physical Exam     Initial Vitals [11/15/17 2154]   BP Pulse Resp Temp SpO2   95/68 73 (!) 22 97.4 °F (36.3 °C) 95 %      MAP       77         Physical Exam    Nursing note and vitals reviewed.  Constitutional: He appears well-developed and well-nourished.   HENT:   Head: Normocephalic and atraumatic.   Eyes: EOM are normal.   Neck: Normal range of motion. Neck supple.   Cardiovascular: Normal rate, regular rhythm, normal heart sounds and intact distal pulses.   Pulmonary/Chest: Breath sounds normal.   Abdominal: Soft.   Musculoskeletal: Normal range of motion.   Neurological: He is alert and oriented to person, place, and time.   Skin: Skin is warm and dry. Capillary refill takes less than 2 seconds.   Psychiatric: He has a normal mood and affect. His behavior is normal. Judgment and thought content normal.         ED Course   Procedures  Labs Reviewed   CBC W/ AUTO DIFFERENTIAL - Abnormal; Notable for the following:        Result Value    RBC 3.81 (*)     Hemoglobin 11.4 (*)     Hematocrit 36.8 (*)     MCHC 31.0 (*)     RDW 15.0 (*)     Platelets 115 (*)     Mono% 15.4 (*)     All other components within normal limits   COMPREHENSIVE METABOLIC PANEL - Abnormal; Notable for the following:     CO2 21 (*)     Glucose 201 (*)     BUN, Bld 71 (*)     Creatinine 2.24 (*)     Total Bilirubin 1.5 (*)     eGFR if  31.7 (*)     eGFR if non  27.5 (*)     All other components within normal limits   TROPONIN I - Abnormal; Notable for the following:     Troponin I 0.036 (*)     All other components within normal limits   PROTIME-INR - Abnormal; Notable for the following:     Prothrombin Time 13.0 (*)     All other components within normal limits   NT-PRO NATRIURETIC PEPTIDE - Abnormal; Notable for the following:      NT-proBNP 61572 (*)     All other components within normal limits          X-Rays:   Independently Interpreted Readings:   Chest X-Ray: Cardiomegaly present.     Medical Decision Making:   Clinical Tests:   Lab Tests: Ordered and Reviewed  Radiological Study: Ordered and Reviewed  Medical Tests: Ordered and Reviewed                   ED Course      Clinical Impression:   The primary encounter diagnosis was Chronic combined systolic and diastolic congestive heart failure. A diagnosis of Shortness of breath was also pertinent to this visit.    Disposition:   Disposition: Discharged  Condition: Stable                        Lyn Mead MD  11/15/17 3419

## 2017-11-17 ENCOUNTER — LAB VISIT (OUTPATIENT)
Dept: LAB | Facility: HOSPITAL | Age: 77
End: 2017-11-17
Attending: INTERNAL MEDICINE
Payer: MEDICARE

## 2017-11-17 DIAGNOSIS — N18.9 CHRONIC KIDNEY DISEASE, UNSPECIFIED CKD STAGE: ICD-10-CM

## 2017-11-17 LAB
ANION GAP SERPL CALC-SCNC: 13 MMOL/L
BUN SERPL-MCNC: 69 MG/DL
CALCIUM SERPL-MCNC: 9.1 MG/DL
CHLORIDE SERPL-SCNC: 109 MMOL/L
CO2 SERPL-SCNC: 23 MMOL/L
CREAT SERPL-MCNC: 2.24 MG/DL
EST. GFR  (AFRICAN AMERICAN): 31.7 ML/MIN/1.73 M^2
EST. GFR  (NON AFRICAN AMERICAN): 27.5 ML/MIN/1.73 M^2
GLUCOSE SERPL-MCNC: 175 MG/DL
POTASSIUM SERPL-SCNC: 3.8 MMOL/L
SODIUM SERPL-SCNC: 145 MMOL/L

## 2017-11-17 PROCEDURE — 80048 BASIC METABOLIC PNL TOTAL CA: CPT | Mod: PO

## 2017-11-17 PROCEDURE — 36415 COLL VENOUS BLD VENIPUNCTURE: CPT | Mod: PO

## 2017-11-21 ENCOUNTER — OFFICE VISIT (OUTPATIENT)
Dept: CARDIOLOGY | Facility: CLINIC | Age: 77
End: 2017-11-21
Payer: MEDICARE

## 2017-11-21 VITALS
HEIGHT: 68 IN | OXYGEN SATURATION: 100 % | BODY MASS INDEX: 21.22 KG/M2 | WEIGHT: 140 LBS | DIASTOLIC BLOOD PRESSURE: 68 MMHG | SYSTOLIC BLOOD PRESSURE: 99 MMHG | HEART RATE: 87 BPM

## 2017-11-21 DIAGNOSIS — I25.5 ISCHEMIC DILATED CARDIOMYOPATHY: Chronic | ICD-10-CM

## 2017-11-21 DIAGNOSIS — J45.909 ALLERGIC BRONCHITIS WITHOUT COMPLICATION: ICD-10-CM

## 2017-11-21 DIAGNOSIS — I25.10 CAD S/P PERCUTANEOUS CORONARY ANGIOPLASTY: Chronic | ICD-10-CM

## 2017-11-21 DIAGNOSIS — I42.0 ISCHEMIC DILATED CARDIOMYOPATHY: Chronic | ICD-10-CM

## 2017-11-21 DIAGNOSIS — N18.9 CHRONIC KIDNEY DISEASE, UNSPECIFIED CKD STAGE: ICD-10-CM

## 2017-11-21 DIAGNOSIS — R55 SYNCOPE, UNSPECIFIED SYNCOPE TYPE: ICD-10-CM

## 2017-11-21 DIAGNOSIS — Z95.5 STATUS POST CORONARY ARTERY STENT PLACEMENT: ICD-10-CM

## 2017-11-21 DIAGNOSIS — I50.42 CHRONIC COMBINED SYSTOLIC AND DIASTOLIC CONGESTIVE HEART FAILURE: Primary | Chronic | ICD-10-CM

## 2017-11-21 DIAGNOSIS — F32.A DEPRESSION, UNSPECIFIED DEPRESSION TYPE: ICD-10-CM

## 2017-11-21 DIAGNOSIS — Z98.61 CAD S/P PERCUTANEOUS CORONARY ANGIOPLASTY: Chronic | ICD-10-CM

## 2017-11-21 DIAGNOSIS — K21.9 GASTROESOPHAGEAL REFLUX DISEASE, ESOPHAGITIS PRESENCE NOT SPECIFIED: Chronic | ICD-10-CM

## 2017-11-21 DIAGNOSIS — I47.20 VENTRICULAR TACHYARRHYTHMIA: ICD-10-CM

## 2017-11-21 DIAGNOSIS — I47.20 VENTRICULAR TACHYCARDIA: ICD-10-CM

## 2017-11-21 DIAGNOSIS — I10 ESSENTIAL HYPERTENSION: Chronic | ICD-10-CM

## 2017-11-21 DIAGNOSIS — Z95.810 AICD (AUTOMATIC CARDIOVERTER/DEFIBRILLATOR) PRESENT: Chronic | ICD-10-CM

## 2017-11-21 DIAGNOSIS — I34.0 MITRAL VALVE INSUFFICIENCY, UNSPECIFIED ETIOLOGY: Chronic | ICD-10-CM

## 2017-11-21 DIAGNOSIS — N18.30 CHRONIC KIDNEY DISEASE, STAGE 3: Chronic | ICD-10-CM

## 2017-11-21 DIAGNOSIS — E11.9 DIABETES MELLITUS TYPE 2, NONINSULIN DEPENDENT: Chronic | ICD-10-CM

## 2017-11-21 PROCEDURE — 99214 OFFICE O/P EST MOD 30 MIN: CPT | Mod: S$PBB,,, | Performed by: INTERNAL MEDICINE

## 2017-11-21 PROCEDURE — 99999 PR PBB SHADOW E&M-EST. PATIENT-LVL III: CPT | Mod: PBBFAC,,, | Performed by: INTERNAL MEDICINE

## 2017-11-21 PROCEDURE — 99213 OFFICE O/P EST LOW 20 MIN: CPT | Mod: PBBFAC,PO | Performed by: INTERNAL MEDICINE

## 2017-11-21 NOTE — PROGRESS NOTES
"Subjective:    Patient ID:  Trever Claros is a 77 y.o. male who presents for follow-up of Congestive Heart Failure      HPI  76 y/o male with hx of HTN, DM, HLP, CKD, CAD s/p PCI 2008 and 2014, HFrEF with ICM with functional MR with last EF 30% (improved from 10%), s/p CRT-D (BiV upgrade Jan 2015 by Dr. Myers), hx of Vtach, moderate to severe MR per last 2DE, PH with PASP >70, GERD who presents for f/u.   Last clinic visit was seen for continued NYHA FC III symptoms and Entresto was uptitrated. Lasix was increased to 40 mg BID and subsequently held due to worsening renal function. He has had a persistent cough and recently presented to ED with cough and SOB. Was seen by Dr Mtz who prescribed benzonatate and Advair which seems to have improved his cough. Current lasix dose is 40 mg in AM and 20 mg in PM. He is making an "average" amount of urine and claims to feel better. Continues to have NYHA FC III symptoms and is on optimal dose of Entresto. Currently on 12.5 mg carvedilol BID and Imdur 60 mg once daily. He denies CP, PND, syncope, LE edema, claudication. He is compliant with his meds. He states that he is able to work in his garden and walk a few blocks. It sounds like his symptoms are provoked by palpitations. ICD interrogation 10/9/2017 available in media section and shows no therapies delivered and no ventricular events. Compliant with meds.     Review of Systems   Constitution: Negative for weakness and malaise/fatigue.   HENT: Negative for congestion.    Eyes: Negative for blurred vision.   Cardiovascular: Positive for dyspnea on exertion and palpitations. Negative for chest pain, claudication, cyanosis, irregular heartbeat, leg swelling, near-syncope, orthopnea, paroxysmal nocturnal dyspnea and syncope.   Respiratory: Positive for cough. Negative for shortness of breath.    Endocrine: Negative for polyuria.   Hematologic/Lymphatic: Negative for bleeding problem.   Skin: Negative for itching and rash. "   Musculoskeletal: Negative for joint swelling, muscle cramps and muscle weakness.   Gastrointestinal: Negative for abdominal pain, hematemesis, hematochezia, melena, nausea and vomiting.   Genitourinary: Negative for dysuria and hematuria.   Neurological: Negative for dizziness, focal weakness, headaches, light-headedness and loss of balance.   Psychiatric/Behavioral: Negative for depression. The patient is not nervous/anxious.         Objective:    Physical Exam   Constitutional: He is oriented to person, place, and time. He appears well-developed and well-nourished.   HENT:   Head: Normocephalic and atraumatic.   Neck: Neck supple. No JVD present.   Cardiovascular: Normal rate and regular rhythm.    Murmur heard.   Systolic murmur is present with a grade of 3/6   Pulses:       Carotid pulses are 2+ on the right side, and 2+ on the left side.       Radial pulses are 2+ on the right side, and 2+ on the left side.        Femoral pulses are 2+ on the right side, and 2+ on the left side.       Dorsalis pedis pulses are 1+ on the right side, and 1+ on the left side.        Posterior tibial pulses are 1+ on the right side, and 1+ on the left side.   Pulmonary/Chest: Effort normal and breath sounds normal.   Abdominal: Soft. Bowel sounds are normal.   Musculoskeletal: He exhibits no edema.   Neurological: He is alert and oriented to person, place, and time.   Skin: Skin is warm and dry.   Psychiatric: He has a normal mood and affect. His behavior is normal. Thought content normal.         Assessment:       1. Chronic combined systolic and diastolic congestive heart failure    2. Chronic kidney disease, unspecified CKD stage    3. Depression, unspecified depression type    4. Allergic bronchitis without complication    5. AICD (automatic cardioverter/defibrillator) present    6. CAD S/P percutaneous coronary angioplasty    7. Essential hypertension    8. Ischemic dilated cardiomyopathy    9. Mitral valve insufficiency,  unspecified etiology    10. Status post coronary artery stent placement    11. Ventricular tachyarrhythmia    12. Ventricular tachycardia    13. Chronic kidney disease, stage 3    14. Diabetes mellitus type 2, noninsulin dependent    15. Gastroesophageal reflux disease, esophagitis presence not specified    16. Syncope, unspecified syncope type      78 y/o male with hx and presentation as above. Symptomatically improved from a cardiac perspective and appears clinically compensated from a HF perspective. Tolerating current Entresto dose. Cough improved on current therapy. I would like to eventually uptitrate his BB but due to relative hypotension will not due at this time. Will wait until next clinic visit and re-address and likely will have to decrease or D/C Imdur to do so. Will continue with current lasix dose for now and repeat BMP in 2 weeks.        Plan:       -Continue current medical regimen  -BMP in 2 weeks  -f/u in 1 month in [facility]

## 2017-11-21 NOTE — PATIENT INSTRUCTIONS
Continue Entresto twice daily  Continue lasix two tablets in the morning (40 mg) and one in the evening (20 mg)  Weigh yourself every day and if you gain 3-5 pounds in 1-2 days, take an extra lasix (20 mg) in the evening  Labs in 2 weeks to check kidney function  Follow up appointment with me in 1 month

## 2017-11-28 ENCOUNTER — TELEPHONE (OUTPATIENT)
Dept: CARDIOLOGY | Facility: CLINIC | Age: 77
End: 2017-11-28

## 2017-11-28 NOTE — TELEPHONE ENCOUNTER
----- Message from Charles Roe MD sent at 11/27/2017 10:57 AM CST -----  Please let Mr Claros know that his kidney function has not worsened and that he is to continue with his medications at their current doses.  Thanks  LEYDI

## 2017-12-03 ENCOUNTER — PATIENT MESSAGE (OUTPATIENT)
Dept: CARDIOLOGY | Facility: CLINIC | Age: 77
End: 2017-12-03

## 2017-12-04 ENCOUNTER — TELEPHONE (OUTPATIENT)
Dept: ELECTROPHYSIOLOGY | Facility: CLINIC | Age: 77
End: 2017-12-04

## 2017-12-04 ENCOUNTER — CLINICAL SUPPORT (OUTPATIENT)
Dept: ELECTROPHYSIOLOGY | Facility: CLINIC | Age: 77
End: 2017-12-04
Attending: INTERNAL MEDICINE
Payer: MEDICARE

## 2017-12-04 ENCOUNTER — OFFICE VISIT (OUTPATIENT)
Dept: CARDIOLOGY | Facility: CLINIC | Age: 77
End: 2017-12-04
Payer: MEDICARE

## 2017-12-04 VITALS
BODY MASS INDEX: 20.76 KG/M2 | HEART RATE: 82 BPM | SYSTOLIC BLOOD PRESSURE: 90 MMHG | DIASTOLIC BLOOD PRESSURE: 59 MMHG | WEIGHT: 137 LBS | HEIGHT: 68 IN

## 2017-12-04 DIAGNOSIS — I10 ESSENTIAL HYPERTENSION: Chronic | ICD-10-CM

## 2017-12-04 DIAGNOSIS — I50.42 CHRONIC COMBINED SYSTOLIC AND DIASTOLIC CHF (CONGESTIVE HEART FAILURE): ICD-10-CM

## 2017-12-04 DIAGNOSIS — I42.9 CARDIOMYOPATHY, UNSPECIFIED TYPE: Primary | ICD-10-CM

## 2017-12-04 DIAGNOSIS — I42.9 CARDIOMYOPATHY, UNSPECIFIED TYPE: ICD-10-CM

## 2017-12-04 DIAGNOSIS — Z95.810 CARDIAC DEFIBRILLATOR IN PLACE: ICD-10-CM

## 2017-12-04 DIAGNOSIS — I25.5 ISCHEMIC DILATED CARDIOMYOPATHY: ICD-10-CM

## 2017-12-04 DIAGNOSIS — I47.20 VENTRICULAR TACHYARRHYTHMIA: ICD-10-CM

## 2017-12-04 DIAGNOSIS — I47.20 VT (VENTRICULAR TACHYCARDIA): ICD-10-CM

## 2017-12-04 DIAGNOSIS — R55 SYNCOPE, UNSPECIFIED SYNCOPE TYPE: ICD-10-CM

## 2017-12-04 DIAGNOSIS — I42.0 ISCHEMIC DILATED CARDIOMYOPATHY: Chronic | ICD-10-CM

## 2017-12-04 DIAGNOSIS — Z95.810 AICD (AUTOMATIC CARDIOVERTER/DEFIBRILLATOR) PRESENT: Primary | Chronic | ICD-10-CM

## 2017-12-04 DIAGNOSIS — I47.20 VENTRICULAR TACHYCARDIA: ICD-10-CM

## 2017-12-04 DIAGNOSIS — I42.0 ISCHEMIC DILATED CARDIOMYOPATHY: ICD-10-CM

## 2017-12-04 DIAGNOSIS — I49.01 VF (VENTRICULAR FIBRILLATION): ICD-10-CM

## 2017-12-04 DIAGNOSIS — Z95.810 AICD (AUTOMATIC CARDIOVERTER/DEFIBRILLATOR) PRESENT: Primary | ICD-10-CM

## 2017-12-04 DIAGNOSIS — I25.5 ISCHEMIC DILATED CARDIOMYOPATHY: Chronic | ICD-10-CM

## 2017-12-04 DIAGNOSIS — Z95.810 AICD (AUTOMATIC CARDIOVERTER/DEFIBRILLATOR) PRESENT: ICD-10-CM

## 2017-12-04 PROCEDURE — 93010 ELECTROCARDIOGRAM REPORT: CPT | Mod: S$PBB,,, | Performed by: INTERNAL MEDICINE

## 2017-12-04 PROCEDURE — 93005 ELECTROCARDIOGRAM TRACING: CPT | Mod: PBBFAC,PO | Performed by: INTERNAL MEDICINE

## 2017-12-04 PROCEDURE — 93296 REM INTERROG EVL PM/IDS: CPT | Mod: PBBFAC | Performed by: INTERNAL MEDICINE

## 2017-12-04 PROCEDURE — 99999 PR PBB SHADOW E&M-EST. PATIENT-LVL III: CPT | Mod: PBBFAC,,, | Performed by: INTERNAL MEDICINE

## 2017-12-04 PROCEDURE — 93295 DEV INTERROG REMOTE 1/2/MLT: CPT | Mod: ,,, | Performed by: INTERNAL MEDICINE

## 2017-12-04 PROCEDURE — 99213 OFFICE O/P EST LOW 20 MIN: CPT | Mod: PBBFAC,PO | Performed by: INTERNAL MEDICINE

## 2017-12-04 PROCEDURE — 99214 OFFICE O/P EST MOD 30 MIN: CPT | Mod: S$PBB,,, | Performed by: INTERNAL MEDICINE

## 2017-12-04 PROCEDURE — 93297 REM INTERROG DEV EVAL ICPMS: CPT | Mod: ,,, | Performed by: INTERNAL MEDICINE

## 2017-12-04 NOTE — PROGRESS NOTES
Subjective:    Patient ID:  Trever Claros is a 77 y.o. male who presents for evaluation of Cardiomyopathy      Coronary Artery Disease   Symptoms include shortness of breath. Pertinent negatives include no chest pain, dizziness, muscle weakness or palpitations.   77 y.o. M   CAD/PCI (none recent)  ICD 2008 (Dr Dukes). No hx ICD shock per pt.  CHF on meds   DM on meds  CRI    In 1/2015, I upgraded his ICD from dual-chamber to biV. Since, he felt much better! Used to get SOB/JESUS with brushing his teeth -- then could walk indefinitely without stopping. No JESUS.    AM of 12/4/17, he had a night terror including dreaming of urinating. Turns out he was actually urinating, and family tried to revive him; couldn't, for 1 minute. Got an ICD shock. ICD interrogation reveals VT, ATP x 3 failed, then degenerated to VF, rescued with shock.  He c/o being more JESUS x weeks. No CP. Appetite down.  ICD shows optivol index much higher over past 2-3 months.    5/14 20% LVEF. 3-4+ MR. Severe LAE.  -> 8/2016 10-15% LVEF.  My interpretation of today's ECG is A-pace, biV-pace. QRSd 100 ms!    Review of Systems   Constitution: Negative. Negative for weakness and malaise/fatigue.   HENT: Negative.  Negative for ear pain and tinnitus.    Eyes: Negative for blurred vision.   Cardiovascular: Positive for dyspnea on exertion (NYHA II). Negative for chest pain, near-syncope, palpitations and syncope.   Respiratory: Positive for shortness of breath.    Endocrine: Negative.  Negative for polyuria.   Hematologic/Lymphatic: Does not bruise/bleed easily.   Skin: Negative.  Negative for rash.   Musculoskeletal: Negative.  Negative for joint pain and muscle weakness.   Gastrointestinal: Negative.  Negative for abdominal pain and change in bowel habit.   Genitourinary: Negative for frequency.   Neurological: Negative.  Negative for dizziness.   Psychiatric/Behavioral: Negative.  Negative for depression. The patient is not nervous/anxious.     Allergic/Immunologic: Negative for environmental allergies.        Objective:    Physical Exam   Constitutional: He is oriented to person, place, and time. He appears well-developed and well-nourished.   HENT:   Head: Normocephalic and atraumatic.   Eyes: Conjunctivae, EOM and lids are normal. No scleral icterus.   Neck: Normal range of motion. No JVD present. No tracheal deviation present. No thyromegaly present.   Cardiovascular: Normal rate, regular rhythm and intact distal pulses.   No extrasystoles are present. PMI is not displaced.  Exam reveals no gallop and no friction rub.    Murmur heard.  High-pitched blowing holosystolic murmur is present with a grade of 4/6  at the apex  Pulses:       Radial pulses are 2+ on the right side, and 2+ on the left side.   Pulmonary/Chest: Effort normal and breath sounds normal. No accessory muscle usage. No tachypnea. No respiratory distress. He has no wheezes. He has no rales.   Abdominal: Soft. Bowel sounds are normal. He exhibits no distension. There is no hepatosplenomegaly. There is no tenderness.   Musculoskeletal: Normal range of motion. He exhibits no edema.   Neurological: He is alert and oriented to person, place, and time. He has normal reflexes. He exhibits normal muscle tone.   Skin: Skin is warm and dry. No rash noted.   Psychiatric: He has a normal mood and affect. His behavior is normal.   Nursing note and vitals reviewed.        Assessment:       1. AICD (automatic cardioverter/defibrillator) present    2. Cardiomyopathy, unspecified type    3. Cardiac defibrillator in place    4. VF (ventricular fibrillation)    5. Essential hypertension    6. Ischemic dilated cardiomyopathy    7. Ventricular tachyarrhythmia    8. Ventricular tachycardia    9. Syncope, unspecified syncope type         Plan:       ICD:  Doing very well s/p biV ICD upgrade.  Excellent electrical resynchronization (QRSd 100 ms!).    VT/VF:  First sustained episode. Success with 1 shock.  PET  "stress, to r/o progression of ischemia. Cath/PCI prn.  No amio now, given first event ever and in the setting of likely HF exacerbation (no peripheral edema, but pt states he "never swells up," and OptiVol index ++).    HF:  Worsening sx c/w worsening HF. Very loud murmur, with 3-4+ MR on recent echo.  Double up on AM lasix x few days.   f/u with Dr Roe for ongoing care. Scheduled for early January.  ? utility of MVR vs Mitral Clip vs other.    F/u as scheduled in 4/2018, or earlier prn.                                  "

## 2017-12-04 NOTE — TELEPHONE ENCOUNTER
I called patient this morning as he was shocked by his AICD. I spoke with his daughter who informed me that they were not able to arouse him this morning but he is awake now. Daughter spoke to him while on the phone with me. Patient stated feeling fine. No symptoms and he did not feel the shock. I informed her that I will give this information to Dr. Cavazos nurse and someone will contact them back today. Daughter stated understanding.

## 2017-12-04 NOTE — TELEPHONE ENCOUNTER
Reviewed AICD shock with Dr. Branch. ICD shock was appropriate and OpiVol is noted to be high. Dr. Branch feels that pt may be having some HF symptoms and should been seen by his cardiologist.  Called pt and spoke to his daughter.  Relayed concerns and advised her to get him seen by his his cardiologist.  Daughter verbalized understanding and said she would call them now.

## 2017-12-15 ENCOUNTER — TELEPHONE (OUTPATIENT)
Dept: CARDIOLOGY | Facility: CLINIC | Age: 77
End: 2017-12-15

## 2017-12-18 ENCOUNTER — CLINICAL SUPPORT (OUTPATIENT)
Dept: CARDIOLOGY | Facility: CLINIC | Age: 77
End: 2017-12-18
Attending: INTERNAL MEDICINE
Payer: MEDICARE

## 2017-12-18 DIAGNOSIS — I10 ESSENTIAL HYPERTENSION: Chronic | ICD-10-CM

## 2017-12-18 DIAGNOSIS — I47.20 VENTRICULAR TACHYCARDIA: ICD-10-CM

## 2017-12-18 DIAGNOSIS — I25.5 ISCHEMIC DILATED CARDIOMYOPATHY: Chronic | ICD-10-CM

## 2017-12-18 DIAGNOSIS — I47.20 VENTRICULAR TACHYARRHYTHMIA: ICD-10-CM

## 2017-12-18 DIAGNOSIS — R55 SYNCOPE, UNSPECIFIED SYNCOPE TYPE: ICD-10-CM

## 2017-12-18 DIAGNOSIS — I42.0 ISCHEMIC DILATED CARDIOMYOPATHY: Chronic | ICD-10-CM

## 2017-12-18 DIAGNOSIS — I49.01 VF (VENTRICULAR FIBRILLATION): ICD-10-CM

## 2017-12-18 DIAGNOSIS — I42.9 CARDIOMYOPATHY, UNSPECIFIED TYPE: ICD-10-CM

## 2017-12-18 DIAGNOSIS — Z95.810 CARDIAC DEFIBRILLATOR IN PLACE: ICD-10-CM

## 2017-12-18 DIAGNOSIS — Z95.810 AICD (AUTOMATIC CARDIOVERTER/DEFIBRILLATOR) PRESENT: Chronic | ICD-10-CM

## 2017-12-18 PROCEDURE — 93017 CV STRESS TEST TRACING ONLY: CPT | Mod: PBBFAC | Performed by: INTERNAL MEDICINE

## 2017-12-18 PROCEDURE — 93016 CV STRESS TEST SUPVJ ONLY: CPT | Mod: S$PBB,,, | Performed by: INTERNAL MEDICINE

## 2017-12-18 PROCEDURE — A9555 RB82 RUBIDIUM: HCPCS | Mod: PBBFAC | Performed by: INTERNAL MEDICINE

## 2017-12-18 PROCEDURE — 78492 MYOCRD IMG PET MLT RST&STRS: CPT | Mod: 26,S$PBB,, | Performed by: INTERNAL MEDICINE

## 2017-12-18 PROCEDURE — 93018 CV STRESS TEST I&R ONLY: CPT | Mod: S$PBB,,, | Performed by: INTERNAL MEDICINE

## 2017-12-21 ENCOUNTER — OFFICE VISIT (OUTPATIENT)
Dept: CARDIOLOGY | Facility: CLINIC | Age: 77
End: 2017-12-21
Payer: MEDICARE

## 2017-12-21 VITALS
DIASTOLIC BLOOD PRESSURE: 44 MMHG | SYSTOLIC BLOOD PRESSURE: 89 MMHG | OXYGEN SATURATION: 99 % | BODY MASS INDEX: 20.46 KG/M2 | HEIGHT: 68 IN | HEART RATE: 70 BPM | WEIGHT: 135 LBS

## 2017-12-21 DIAGNOSIS — I10 ESSENTIAL HYPERTENSION: Chronic | ICD-10-CM

## 2017-12-21 DIAGNOSIS — N18.30 CHRONIC KIDNEY DISEASE, STAGE 3: Chronic | ICD-10-CM

## 2017-12-21 DIAGNOSIS — I25.10 CAD S/P PERCUTANEOUS CORONARY ANGIOPLASTY: Chronic | ICD-10-CM

## 2017-12-21 DIAGNOSIS — I25.5 ISCHEMIC DILATED CARDIOMYOPATHY: Chronic | ICD-10-CM

## 2017-12-21 DIAGNOSIS — K21.9 GASTROESOPHAGEAL REFLUX DISEASE, ESOPHAGITIS PRESENCE NOT SPECIFIED: Chronic | ICD-10-CM

## 2017-12-21 DIAGNOSIS — I47.20 VENTRICULAR TACHYARRHYTHMIA: ICD-10-CM

## 2017-12-21 DIAGNOSIS — Z95.5 STATUS POST CORONARY ARTERY STENT PLACEMENT: ICD-10-CM

## 2017-12-21 DIAGNOSIS — Z95.810 AICD (AUTOMATIC CARDIOVERTER/DEFIBRILLATOR) PRESENT: Chronic | ICD-10-CM

## 2017-12-21 DIAGNOSIS — E11.9 DIABETES MELLITUS TYPE 2, NONINSULIN DEPENDENT: Chronic | ICD-10-CM

## 2017-12-21 DIAGNOSIS — I42.0 ISCHEMIC DILATED CARDIOMYOPATHY: Chronic | ICD-10-CM

## 2017-12-21 DIAGNOSIS — I34.0 MITRAL VALVE INSUFFICIENCY, UNSPECIFIED ETIOLOGY: Chronic | ICD-10-CM

## 2017-12-21 DIAGNOSIS — I49.01 VF (VENTRICULAR FIBRILLATION): ICD-10-CM

## 2017-12-21 DIAGNOSIS — I50.42 CHRONIC COMBINED SYSTOLIC AND DIASTOLIC CONGESTIVE HEART FAILURE: Primary | Chronic | ICD-10-CM

## 2017-12-21 DIAGNOSIS — Z98.61 CAD S/P PERCUTANEOUS CORONARY ANGIOPLASTY: Chronic | ICD-10-CM

## 2017-12-21 PROCEDURE — 99999 PR PBB SHADOW E&M-EST. PATIENT-LVL III: CPT | Mod: PBBFAC,,, | Performed by: INTERNAL MEDICINE

## 2017-12-21 PROCEDURE — 99213 OFFICE O/P EST LOW 20 MIN: CPT | Mod: PBBFAC,PO | Performed by: INTERNAL MEDICINE

## 2017-12-21 PROCEDURE — 99214 OFFICE O/P EST MOD 30 MIN: CPT | Mod: S$PBB,,, | Performed by: INTERNAL MEDICINE

## 2017-12-21 RX ORDER — SACUBITRIL AND VALSARTAN 49; 51 MG/1; MG/1
1 TABLET, FILM COATED ORAL 2 TIMES DAILY
Refills: 11 | COMMUNITY
Start: 2017-11-27

## 2017-12-21 NOTE — PROGRESS NOTES
Subjective:    Patient ID:  Trever Claros is a 77 y.o. male who presents for follow-up of Palpitations; Shortness of Breath; and Congestive Heart Failure      HPI  76 y/o male with hx of HTN, DM, HLP, CKD, CAD s/p PCI 2008 and 2014, HFrEF with ICM with functional MR with last EF 30% (improved from 10%), s/p CRT-D (BiV upgrade Jan 2015 by Dr. Myers), hx of Vtach, moderate to severe MR per last 2DE, PH with PASP >70, GERD who presents for f/u.   Last clinic visit was seen for continued NYHA FC III symptoms and Entresto was uptitrated. Lasix was increased to 40 mg BID and subsequently held due to worsening renal function. Recently increased to 80 mg in AM and 40 in PM for a few days and now back to 40 BID. He has had a persistent cough and has JESUS with minimal activity.   Continues to have NYHA FC III symptoms and is on optimal dose of Entresto. Currently on 12.5 mg carvedilol BID and Imdur 60 mg once daily. He denies CP, PND, syncope, LE edema, claudication. He is compliant with his meds. Symptoms are worsened with palpitations. Had recent ICD shock while sleeping confirmed by EP and recently seen by Dr Myers. PET stress without evidence of ischemia but with about 30% infarct. Compliant with meds.     Review of Systems   Constitution: Positive for weakness and malaise/fatigue.   HENT: Negative for congestion.    Eyes: Negative for blurred vision.   Cardiovascular: Positive for dyspnea on exertion and orthopnea. Negative for chest pain, claudication, cyanosis, irregular heartbeat, near-syncope, palpitations, paroxysmal nocturnal dyspnea and syncope.   Respiratory: Positive for shortness of breath.    Endocrine: Negative for polyuria.   Hematologic/Lymphatic: Negative for bleeding problem.   Skin: Negative for itching and rash.   Musculoskeletal: Negative for joint swelling, muscle cramps and muscle weakness.   Gastrointestinal: Negative for abdominal pain, hematemesis, hematochezia, melena, nausea and vomiting.    Genitourinary: Negative for dysuria and hematuria.   Neurological: Negative for dizziness, focal weakness, headaches, light-headedness and loss of balance.   Psychiatric/Behavioral: Negative for depression. The patient is not nervous/anxious.         Objective:    Physical Exam   Constitutional: He is oriented to person, place, and time. He appears well-developed and well-nourished.   HENT:   Head: Normocephalic and atraumatic.   Neck: Neck supple. JVD present.   Cardiovascular: Normal rate and regular rhythm.    Murmur heard.  High-pitched blowing holosystolic murmur is present with a grade of 3/6  at the apex  Pulses:       Carotid pulses are 2+ on the right side, and 2+ on the left side.       Radial pulses are 2+ on the right side, and 2+ on the left side.        Femoral pulses are 2+ on the right side, and 2+ on the left side.       Posterior tibial pulses are 1+ on the right side, and 1+ on the left side.   Pulmonary/Chest: Effort normal and breath sounds normal.   Abdominal: Soft. Bowel sounds are normal.   Musculoskeletal: He exhibits no edema.   Neurological: He is alert and oriented to person, place, and time.   Skin: Skin is warm and dry.   Psychiatric: He has a normal mood and affect. His behavior is normal. Thought content normal.         Assessment:       1. Chronic combined systolic and diastolic congestive heart failure    2. AICD (automatic cardioverter/defibrillator) present    3. CAD S/P percutaneous coronary angioplasty    4. Essential hypertension    5. Ischemic dilated cardiomyopathy    6. Mitral valve insufficiency, unspecified etiology    7. Status post coronary artery stent placement    8. Ventricular tachyarrhythmia    9. VF (ventricular fibrillation)    10. Chronic kidney disease, stage 3    11. Diabetes mellitus type 2, noninsulin dependent    12. Gastroesophageal reflux disease, esophagitis presence not specified      78 y/o male with hx and presentation as above. Continues to have  NYHA FC III symptoms and maximized on Entresto, cannot increase BB due to hypotension and becomes dizzy/symtpomatic with higher lasix doses. Will change lasix to 40 mg TID to see if this helps with diuresis and symptoms. Will refer to Dr Castellanos for evaluation of Mitraclip, as I am limited with medication titration/addition at this time.        Plan:       -Lasix 40 mg TID  -Refer to Dr Castellanos for evaluation for Mitraclip  -f/u in 2 weeks

## 2017-12-22 DIAGNOSIS — I50.9 CONGESTIVE HEART FAILURE, UNSPECIFIED CONGESTIVE HEART FAILURE CHRONICITY, UNSPECIFIED CONGESTIVE HEART FAILURE TYPE: ICD-10-CM

## 2017-12-22 DIAGNOSIS — I34.0 MITRAL VALVE INSUFFICIENCY, UNSPECIFIED ETIOLOGY: Primary | ICD-10-CM

## 2018-01-08 ENCOUNTER — CLINICAL SUPPORT (OUTPATIENT)
Dept: ELECTROPHYSIOLOGY | Facility: CLINIC | Age: 78
End: 2018-01-08
Payer: MEDICARE

## 2018-01-08 DIAGNOSIS — I25.5 CARDIOMYOPATHY, ISCHEMIC: ICD-10-CM

## 2018-01-08 DIAGNOSIS — Z95.810 ICD (IMPLANTABLE CARDIOVERTER-DEFIBRILLATOR) IN PLACE: ICD-10-CM

## 2018-01-08 PROCEDURE — 93296 REM INTERROG EVL PM/IDS: CPT | Mod: PBBFAC | Performed by: INTERNAL MEDICINE

## 2018-01-08 PROCEDURE — 93295 DEV INTERROG REMOTE 1/2/MLT: CPT | Mod: ,,, | Performed by: INTERNAL MEDICINE

## 2018-01-09 ENCOUNTER — HOSPITAL ENCOUNTER (OUTPATIENT)
Dept: CARDIOLOGY | Facility: CLINIC | Age: 78
Discharge: HOME | End: 2018-01-09
Payer: MEDICARE

## 2018-01-09 ENCOUNTER — OFFICE VISIT (OUTPATIENT)
Dept: CARDIOLOGY | Facility: CLINIC | Age: 78
End: 2018-01-09
Payer: MEDICARE

## 2018-01-09 ENCOUNTER — INITIAL CONSULT (OUTPATIENT)
Dept: TRANSPLANT | Facility: CLINIC | Age: 78
End: 2018-01-09
Payer: MEDICARE

## 2018-01-09 VITALS
SYSTOLIC BLOOD PRESSURE: 92 MMHG | DIASTOLIC BLOOD PRESSURE: 58 MMHG | HEART RATE: 97 BPM | BODY MASS INDEX: 20.16 KG/M2 | HEIGHT: 68 IN | WEIGHT: 133 LBS

## 2018-01-09 VITALS
SYSTOLIC BLOOD PRESSURE: 86 MMHG | HEART RATE: 62 BPM | WEIGHT: 133.38 LBS | BODY MASS INDEX: 20.21 KG/M2 | OXYGEN SATURATION: 100 % | DIASTOLIC BLOOD PRESSURE: 62 MMHG | HEIGHT: 68 IN

## 2018-01-09 DIAGNOSIS — I47.20 VENTRICULAR TACHYCARDIA: ICD-10-CM

## 2018-01-09 DIAGNOSIS — F32.A DEPRESSION, UNSPECIFIED DEPRESSION TYPE: ICD-10-CM

## 2018-01-09 DIAGNOSIS — I50.42 CHRONIC COMBINED SYSTOLIC AND DIASTOLIC CONGESTIVE HEART FAILURE: Chronic | ICD-10-CM

## 2018-01-09 DIAGNOSIS — N18.30 CHRONIC KIDNEY DISEASE, STAGE 3: Chronic | ICD-10-CM

## 2018-01-09 DIAGNOSIS — I25.10 CAD S/P PERCUTANEOUS CORONARY ANGIOPLASTY: Chronic | ICD-10-CM

## 2018-01-09 DIAGNOSIS — Z98.61 CAD S/P PERCUTANEOUS CORONARY ANGIOPLASTY: Chronic | ICD-10-CM

## 2018-01-09 DIAGNOSIS — I42.0 ISCHEMIC DILATED CARDIOMYOPATHY: Chronic | ICD-10-CM

## 2018-01-09 DIAGNOSIS — Z95.810 AICD (AUTOMATIC CARDIOVERTER/DEFIBRILLATOR) PRESENT: Chronic | ICD-10-CM

## 2018-01-09 DIAGNOSIS — I25.5 ISCHEMIC DILATED CARDIOMYOPATHY: Chronic | ICD-10-CM

## 2018-01-09 DIAGNOSIS — I50.9 CONGESTIVE HEART FAILURE, UNSPECIFIED CONGESTIVE HEART FAILURE CHRONICITY, UNSPECIFIED CONGESTIVE HEART FAILURE TYPE: ICD-10-CM

## 2018-01-09 DIAGNOSIS — I34.0 MITRAL VALVE INSUFFICIENCY, UNSPECIFIED ETIOLOGY: Primary | Chronic | ICD-10-CM

## 2018-01-09 DIAGNOSIS — I34.0 MITRAL VALVE INSUFFICIENCY, UNSPECIFIED ETIOLOGY: ICD-10-CM

## 2018-01-09 DIAGNOSIS — E11.9 DIABETES MELLITUS TYPE 2, NONINSULIN DEPENDENT: Chronic | ICD-10-CM

## 2018-01-09 DIAGNOSIS — I50.42 CHRONIC COMBINED SYSTOLIC AND DIASTOLIC CONGESTIVE HEART FAILURE: Primary | Chronic | ICD-10-CM

## 2018-01-09 LAB
ESTIMATED PA SYSTOLIC PRESSURE: 101.86
MITRAL VALVE MOBILITY: ABNORMAL
MITRAL VALVE REGURGITATION: ABNORMAL
RETIRED EF AND QEF - SEE NOTES: 20 (ref 55–65)
TRICUSPID VALVE REGURGITATION: ABNORMAL

## 2018-01-09 PROCEDURE — 99215 OFFICE O/P EST HI 40 MIN: CPT | Mod: S$PBB,,, | Performed by: INTERNAL MEDICINE

## 2018-01-09 PROCEDURE — 99215 OFFICE O/P EST HI 40 MIN: CPT | Mod: PBBFAC

## 2018-01-09 PROCEDURE — 99999 PR PBB SHADOW E&M-EST. PATIENT-LVL V: CPT | Mod: PBBFAC,,,

## 2018-01-09 PROCEDURE — 99213 OFFICE O/P EST LOW 20 MIN: CPT | Mod: PBBFAC,27 | Performed by: INTERNAL MEDICINE

## 2018-01-09 PROCEDURE — 93306 TTE W/DOPPLER COMPLETE: CPT | Mod: PBBFAC | Performed by: INTERNAL MEDICINE

## 2018-01-09 PROCEDURE — 99999 PR PBB SHADOW E&M-EST. PATIENT-LVL III: CPT | Mod: PBBFAC,,, | Performed by: INTERNAL MEDICINE

## 2018-01-09 PROCEDURE — 99204 OFFICE O/P NEW MOD 45 MIN: CPT | Mod: S$PBB,,, | Performed by: INTERNAL MEDICINE

## 2018-01-09 NOTE — ASSESSMENT & PLAN NOTE
Previously on Tradjenta -- discontinued due to hypoglycemia.   Now diet-controlled.   HgbA1C = 5.9%

## 2018-01-09 NOTE — PROGRESS NOTES
"Subjective:    Patient ID:  Trever Claros is a 77 y.o. male who presents for evaluation of Mitral Regurgitation    Referring: Dr. Jesus Roe    HPI  Mr. Claros is a very pleasant gentleman referred by Dr. Roe with hx of CAD s/p PCI in 2008 and 2014, HFrEF with ICM with functional MR with last EF 20% (improved from 10%), s/p CRT-D (BiV upgrade Jan 2015 by Dr. Myers), hx of Vtach, HTN, DM, HLP, Stage III CKD, PH with PASP >120mmHg by echo, GERD and HTN who presents for consideration of MitraClip.    Dr. Roe has been medically managing him with Entresto, Coreg, Imdur, and Lasix. His Lasix dose has been adjusted several times due to worsening renal dysfunction and dizziness; he is currently taking 40mg BID. He is maximized on Entresto, and his b-blocker cannot be increased due to hypotension. He continues to have NYHA Class III sx. He denies CP, PND, orthopnea, or LE edema.     He complains of being "off balance". This started about 6 months ago and has seemed to worsen over time. He also c/o palpitations that are worse when he is anxious. His last ICD interrogation showed a VT episode on 12/4 that was successfully defibrillated    PET stress on 12/18 showed no evidence of ischemia but with about 30% infarct. His last coronary angiogram was in 2014 when he had a ÁLVARO placed in his RCA.     Review of patient's allergies indicates:   Allergen Reactions    Antihistamines - alkylamine      Due to prostate     Sulfa (sulfonamide antibiotics) Hives       Current Outpatient Prescriptions:     allopurinol (ZYLOPRIM) 100 MG tablet, Take 100 mg by mouth once daily., Disp: , Rfl:     alprazolam (XANAX) 0.25 MG tablet, Take 1 tablet (0.25 mg total) by mouth nightly as needed for Anxiety., Disp: 30 tablet, Rfl: 5    aspirin (ECOTRIN) 81 MG EC tablet, Take 81 mg by mouth once daily., Disp: , Rfl:     atorvastatin (LIPITOR) 20 MG tablet, Take 1 tablet (20 mg total) by mouth every evening., Disp: 90 tablet, Rfl: " 3    carvedilol (COREG) 12.5 MG tablet, Take 1 tablet by mouth 2 (two) times daily., Disp: , Rfl: 3    clopidogrel (PLAVIX) 75 mg tablet, Take 1 tablet (75 mg total) by mouth once daily., Disp: 90 tablet, Rfl: 3    ENTRESTO 49-51 mg per tablet, Take 1 tablet by mouth 2 (two) times daily., Disp: , Rfl: 11    escitalopram oxalate (LEXAPRO) 10 MG tablet, Take 1 tablet (10 mg total) by mouth once daily. For depression, Disp: 30 tablet, Rfl: 11    finasteride (PROSCAR) 5 mg tablet, Take 5 mg by mouth once daily., Disp: , Rfl: 1    fluticasone-salmeterol 250-50 mcg/dose (ADVAIR) 250-50 mcg/dose diskus inhaler, Inhale 1 puff into the lungs 2 (two) times daily. Controller, Disp: 1 each, Rfl: 3    furosemide (LASIX) 20 MG tablet, Take 2 tablets (40 mg total) by mouth every 12 (twelve) hours. For fluid, Disp: 360 tablet, Rfl: 3    isosorbide mononitrate (IMDUR) 60 MG 24 hr tablet, Take 1 tablet (60 mg total) by mouth once daily., Disp: 90 tablet, Rfl: 3    omeprazole (PRILOSEC) 20 MG capsule, Take 1 capsule (20 mg total) by mouth 2 (two) times daily., Disp: 180 capsule, Rfl: 3    polyethylene glycol (GLYCOLAX) 17 gram/dose powder, TAKE 17 GRAMS BY MOUTH NIGHTLY, Disp: 1581 g, Rfl: 3    sacubitril-valsartan (ENTRESTO)  mg per tablet, Take 1 tablet by mouth 2 (two) times daily., Disp: 180 tablet, Rfl: 3    saw palmetto 500 MG capsule, Take 450 mg by mouth once daily. , Disp: , Rfl:     tamsulosin (FLOMAX) 0.4 mg Cp24, Take 1 capsule (0.4 mg total) by mouth every evening., Disp: , Rfl:     nitroGLYCERIN (NITROSTAT) 0.4 MG SL tablet, Place 1 tablet (0.4 mg total) under the tongue every 5 (five) minutes as needed for Chest pain (maximum 3 doses in 24 hours)., Disp: 20 tablet, Rfl: 12    URELLE 81-0.12 mg Tab, Take 1 tablet by mouth as needed. , Disp: , Rfl:       Review of Systems   Constitution: Negative for chills, diaphoresis, fever, weakness, weight gain and weight loss.   HENT: Negative for sore throat.   "  Eyes: Negative for blurred vision, vision loss in left eye, vision loss in right eye and visual disturbance.   Cardiovascular: Positive for dyspnea on exertion and palpitations. Negative for chest pain, claudication, leg swelling, near-syncope, orthopnea, paroxysmal nocturnal dyspnea and syncope.   Respiratory: Negative for cough, hemoptysis, shortness of breath, sputum production and wheezing.    Endocrine: Negative for cold intolerance and heat intolerance.   Hematologic/Lymphatic: Negative for adenopathy. Does not bruise/bleed easily.   Skin: Negative for rash.   Musculoskeletal: Negative for falls, muscle weakness and myalgias.   Gastrointestinal: Negative for abdominal pain, change in bowel habit, constipation, diarrhea, melena and nausea.   Genitourinary: Negative for bladder incontinence.   Neurological: Positive for loss of balance. Negative for dizziness, focal weakness, headaches, light-headedness and numbness.   Psychiatric/Behavioral: Negative for altered mental status.         Vitals:    01/09/18 1315 01/09/18 1324   BP: (!) 88/60 (!) 86/62   BP Location: Left arm Right arm   Patient Position: Sitting Sitting   BP Method: Medium (Manual) Medium (Manual)   Pulse: 60 62   SpO2: 100% 100%   Weight: 60.5 kg (133 lb 6.1 oz) 60.5 kg (133 lb 6.1 oz)   Height: 5' 8" (1.727 m) 5' 8" (1.727 m)   Body mass index is 20.28 kg/m².\    Objective:    Physical Exam   Constitutional: He is oriented to person, place, and time. He appears well-developed and well-nourished.   HENT:   Head: Normocephalic and atraumatic.   Eyes: EOM are normal. Pupils are equal, round, and reactive to light.   Neck: Neck supple. No JVD present. No tracheal deviation present. No thyromegaly present.   Cardiovascular: Normal rate, regular rhythm, S1 normal, S2 normal, intact distal pulses and normal pulses.  PMI is not displaced.  Exam reveals no gallop and no friction rub.    Murmur heard.  High-pitched blowing holosystolic murmur is present " with a grade of 3/6  at the apex  + HJR   Pulmonary/Chest: Effort normal and breath sounds normal. No respiratory distress. He has no wheezes. He has no rales. He exhibits no tenderness.   Abdominal: Soft. Bowel sounds are normal. He exhibits no distension and no mass. There is no hepatosplenomegaly. There is no tenderness.   Musculoskeletal: Normal range of motion. He exhibits no edema or tenderness.   Neurological: He is alert and oriented to person, place, and time.   Skin: Skin is warm and dry. No rash noted.   Psychiatric: He has a normal mood and affect. His behavior is normal.         Assessment:         Mitral regurgitation  Echo today shows evidence of tethered posterior leaflet with severe mitral regurgitation. MR is functional, eccentric and directed posteriorly.       AICD (automatic cardioverter/defibrillator) present  BiV AICD.  Follows with Dr. Myers.     Ischemic dilated cardiomyopathy  EF= 20%  LVEDD 6.2 cm.    CAD S/P percutaneous coronary angioplasty  RCA PCI in 10/2014 (4.0 x 22 mm Resolute ÁLVARO, Dr. Oliva)      Diabetes mellitus type 2, noninsulin dependent  Previously on Tradjenta -- discontinued due to hypoglycemia.   Now diet-controlled.   HgbA1C = 5.9%    Chronic kidney disease, stage 3  Stable.   Baseline Cr 2.0, GFR 36    Ventricular tachycardia  Episode of VT on 12/4/17 -- successfully defibrillated.    Chronic combined systolic and diastolic congestive heart failure  Difficulty with volume management.   Currently on Lasix 40mg BID.   Maximized on Entresto, and his b-blocker cannot be increased due to hypotension.   He continues to have NYHA Class III sx.    Plan:       Mr. Claros is not currently a candidate for MitraClip given that his MR is functional.   Recommend he see [facility] for optimization of his medical therapy (he will see Dr. ARSEN Rosario this afternoon).   If/when his renal function stabilizes, could consider coronary angiography +/- RCA PCI prn.   He will follow up with   Bhupinder.           I have personally taken the history and examined this patient. I have discussed and agree with the resident's findings and plan as documented in the resident's note.  Wong Castellanos

## 2018-01-09 NOTE — ASSESSMENT & PLAN NOTE
Difficulty with volume management.   Currently on Lasix 40mg BID.   Maximized on Entresto, and his b-blocker cannot be increased due to hypotension.   He continues to have NYHA Class III sx.

## 2018-01-09 NOTE — ASSESSMENT & PLAN NOTE
Echo today shows evidence of tethered posterior leaflet with severe mitral regurgitation. MR is functional, eccentric and directed posteriorly.

## 2018-01-10 NOTE — ASSESSMENT & PLAN NOTE
Hypotension limits medication titration - will STOP IMDUR   Spread out coreg and enstresto to help with dizziness  Next visit would like to increase coreg or entresto if SBP higher

## 2018-01-10 NOTE — PROGRESS NOTES
Subjective: class 3B     Patient ID:  Trever Claros is a 77 y.o. male who presents for evaluation of Mitral Regurgitation and Congestive Heart Failure      HPI  hx of CAD s/p PCI 2014, HFrEF with ICM with functional MR (LVEF 20%), s/p BiV / ICD Jan 2015, Stage III CKD who seems to have a progressive decline since Summer 2017.  Developed hoarse voice x six months which has been related to medications (a diabetic drug / monopril)  Despite stopping these meds, hoarseness persists.  In Oct 2017, started on entresto and in early Nov 2017 he increased his lasix to 40 BID and in Dec 2017 lasix increased to 40 TID.  Since Nov 2017, he has lost about 10 lbs.  Currently has orthostatic dizziness Comes in wheelchair as he is unable to walk into clinic due to combination of weakness / JESUS Has not been admitted since Nov 2017 per pt      Review of Systems   Constitution: Positive for decreased appetite, weakness and weight loss. Negative for weight gain.   Cardiovascular: Negative for chest pain, dyspnea on exertion, leg swelling, near-syncope, orthopnea and palpitations.   Respiratory: Positive for shortness of breath. Negative for cough.    Musculoskeletal: Negative for myalgias.   Gastrointestinal: Negative for jaundice.        Objective:    Physical Exam   Constitutional: He is oriented to person, place, and time. He appears well-developed and well-nourished. He appears cachectic. He is active. He is not intubated.   Temporal wasting    HENT:   Head: Normocephalic and atraumatic. Hair is normal.   Right Ear: External ear normal.   Left Ear: External ear normal.   Nose: Nose normal. No nasal deformity. No epistaxis.  No foreign bodies.   Mouth/Throat: Mucous membranes are normal. Mucous membranes are not cyanotic. No oropharyngeal exudate.   Eyes: Conjunctivae and EOM are normal. Pupils are equal, round, and reactive to light.   Neck: Neck supple. No hepatojugular reflux and no JVD present.   Cardiovascular: Normal rate,  regular rhythm, normal heart sounds and normal pulses.  Exam reveals no gallop.    Pulmonary/Chest: Effort normal and breath sounds normal. No apnea and no tachypnea. He is not intubated. No respiratory distress. He exhibits no tenderness.   Abdominal: Soft. Normal appearance and bowel sounds are normal. There is no tenderness. No hernia.   Musculoskeletal: Normal range of motion.   Neurological: He is alert and oriented to person, place, and time. He displays no seizure activity.   Skin: Skin is warm, dry and intact. No rash noted. No pallor.   Psychiatric: He has a normal mood and affect. His speech is normal and behavior is normal. Thought content normal. Cognition and memory are normal.        Lab Results   Component Value Date    BNP >4,900 (H) 01/09/2018    BNP 1720 (H) 03/10/2016     (H) 01/09/2018     08/08/2017    K 4.0 01/09/2018    K 4.5 08/08/2017    MG 2.4 07/23/2016     01/09/2018     08/08/2017    CO2 25 01/09/2018    CO2 22 (L) 08/08/2017    BUN 48 (H) 01/09/2018    BUN 52 (H) 08/08/2017    BUN 30 (H) 10/21/2015    CREATININE 2.0 (H) 01/09/2018    CREATININE 1.9 (H) 08/08/2017     (H) 01/09/2018     (H) 08/08/2017    HGBA1C 5.9 (H) 08/18/2017    AST 28 11/15/2017    AST 36 04/22/2016    ALT 32 11/15/2017    ALBUMIN 3.1 (L) 01/09/2018    PROT 6.7 11/15/2017    BILITOT 1.5 (H) 11/15/2017    CHOL 127 12/15/2014    HDL 35 (L) 12/15/2014    LDLCALC 73.0 12/15/2014    TRIG 95 12/15/2014       Lab Results   Component Value Date    WBC 3.75 (L) 01/09/2018    HGB 10.8 (L) 01/09/2018    HCT 35.5 (L) 01/09/2018    MCV 94 01/09/2018     (L) 01/09/2018         NT-pro-BNP  ([place])   Date Value Ref Range Status   03/10/2016 1720 (H) 5 - 900 pg/mL Final   10/21/2015 1610 (H) 5 - 900 pg/mL Final   05/03/2015 1990 (H) 5 - 900 pg/mL Final     BNP   Date Value Ref Range Status   01/09/2018 >4,900 (H) 0 - 99 pg/mL Final     Comment:     Values of less than 100 pg/ml  are consistent with non-CHF populations.   04/22/2017 806 (H) 0 - 99 pg/mL Final     Comment:     Values of less than 100 pg/ml are consistent with non-CHF populations.   10/22/2015 440 (H) 0 - 99 pg/mL Final     Comment:     Values of less than 100 pg/ml are consistent with non-CHF populations.             Assessment:       1. Chronic combined systolic and diastolic congestive heart failure    2. Depression, unspecified depression type         Plan:   Chronic combined systolic and diastolic congestive heart failure  Hypotension limits medication titration - will STOP IMDUR   Spread out coreg and enstresto to help with dizziness  Next visit would like to increase coreg or entresto if SBP higher     Depression  Seems appropiate for his advanced CHF   Will continue to  discuss depression / goals of care at next visit     May try to get back [facility] regiment if easier for pt     Return in about 3 weeks (around 1/30/2018) for medication titration.  Orders Placed This Encounter    Brain natriuretic peptide    Comprehensive metabolic panel

## 2018-01-10 NOTE — ASSESSMENT & PLAN NOTE
Seems appropiate for his advanced CHF   Will continue to  discuss depression / goals of care at next visit

## 2018-01-16 ENCOUNTER — PATIENT MESSAGE (OUTPATIENT)
Dept: TRANSPLANT | Facility: CLINIC | Age: 78
End: 2018-01-16

## 2018-01-16 ENCOUNTER — OFFICE VISIT (OUTPATIENT)
Dept: CARDIOLOGY | Facility: CLINIC | Age: 78
End: 2018-01-16
Payer: MEDICARE

## 2018-01-16 VITALS
OXYGEN SATURATION: 100 % | HEIGHT: 68 IN | SYSTOLIC BLOOD PRESSURE: 77 MMHG | DIASTOLIC BLOOD PRESSURE: 58 MMHG | WEIGHT: 133.5 LBS | HEART RATE: 76 BPM | BODY MASS INDEX: 20.23 KG/M2

## 2018-01-16 DIAGNOSIS — Z95.810 AICD (AUTOMATIC CARDIOVERTER/DEFIBRILLATOR) PRESENT: Chronic | ICD-10-CM

## 2018-01-16 DIAGNOSIS — I49.01 VF (VENTRICULAR FIBRILLATION): ICD-10-CM

## 2018-01-16 DIAGNOSIS — I25.5 ISCHEMIC DILATED CARDIOMYOPATHY: Chronic | ICD-10-CM

## 2018-01-16 DIAGNOSIS — I42.0 ISCHEMIC DILATED CARDIOMYOPATHY: Chronic | ICD-10-CM

## 2018-01-16 DIAGNOSIS — N18.30 CHRONIC KIDNEY DISEASE, STAGE 3: Chronic | ICD-10-CM

## 2018-01-16 DIAGNOSIS — I25.10 CAD S/P PERCUTANEOUS CORONARY ANGIOPLASTY: Chronic | ICD-10-CM

## 2018-01-16 DIAGNOSIS — Z98.61 CAD S/P PERCUTANEOUS CORONARY ANGIOPLASTY: Chronic | ICD-10-CM

## 2018-01-16 DIAGNOSIS — I34.0 MITRAL VALVE INSUFFICIENCY, UNSPECIFIED ETIOLOGY: Chronic | ICD-10-CM

## 2018-01-16 DIAGNOSIS — I50.42 CHRONIC COMBINED SYSTOLIC AND DIASTOLIC CONGESTIVE HEART FAILURE: Primary | Chronic | ICD-10-CM

## 2018-01-16 DIAGNOSIS — Z95.5 STATUS POST CORONARY ARTERY STENT PLACEMENT: ICD-10-CM

## 2018-01-16 DIAGNOSIS — E11.9 DIABETES MELLITUS TYPE 2, NONINSULIN DEPENDENT: Chronic | ICD-10-CM

## 2018-01-16 DIAGNOSIS — I47.20 VENTRICULAR TACHYARRHYTHMIA: ICD-10-CM

## 2018-01-16 PROCEDURE — 99214 OFFICE O/P EST MOD 30 MIN: CPT | Mod: S$PBB,,, | Performed by: INTERNAL MEDICINE

## 2018-01-16 PROCEDURE — 99999 PR PBB SHADOW E&M-EST. PATIENT-LVL IV: CPT | Mod: PBBFAC,,, | Performed by: INTERNAL MEDICINE

## 2018-01-16 PROCEDURE — 99214 OFFICE O/P EST MOD 30 MIN: CPT | Mod: PBBFAC,PO | Performed by: INTERNAL MEDICINE

## 2018-01-16 NOTE — PROGRESS NOTES
Subjective:    Patient ID:  Trever Claros is a 77 y.o. male who presents for follow-up of Congestive Heart Failure and Valvular Heart Disease      HPI  78 y/o male with hx of HTN, DM, HLP, CKD, CAD s/p PCI 2008 and 2014, HFrEF with ICM with functional MR with last EF 30% (improved from 10%), s/p CRT-D (BiV upgrade Jan 2015 by Dr. Myers), hx of Vtach, moderate to severe MR per last 2DE, PH with PASP >70, GERD who presents for f/u.   Last clinic visit was seen for continued NYHA FC III symptoms and Entresto was uptitrated. Continues to have NYHA FC III symptoms and is on optimal dose of Entresto. Was referred for Mitraclip evaluation with Dr Castellanos but does not qualify due to functional MR. Was seen by [facility] and medication changes made including discontinuation of Imdur. He states he feels somewhat better. Continues to have JESUS and palps. He denies CP, PND, syncope, LE edema, claudication. He is compliant with his meds. Symptoms are worsened with palpitations.      Review of Systems   Constitution: Positive for weakness and malaise/fatigue.   HENT: Negative for congestion.    Eyes: Negative for blurred vision.   Cardiovascular: Positive for dyspnea on exertion and palpitations. Negative for chest pain, claudication, cyanosis, irregular heartbeat, leg swelling, near-syncope, paroxysmal nocturnal dyspnea and syncope.   Respiratory: Positive for shortness of breath.    Endocrine: Negative for polyuria.   Hematologic/Lymphatic: Negative for bleeding problem.   Skin: Negative for itching and rash.   Musculoskeletal: Negative for joint swelling, muscle cramps and muscle weakness.   Gastrointestinal: Negative for abdominal pain, hematemesis, hematochezia, melena, nausea and vomiting.   Genitourinary: Negative for dysuria and hematuria.   Neurological: Negative for dizziness, focal weakness, headaches, light-headedness and loss of balance.   Psychiatric/Behavioral: Negative for depression. The patient is not  nervous/anxious.         Objective:    Physical Exam   Constitutional: He is oriented to person, place, and time. He appears well-developed and well-nourished.   HENT:   Head: Normocephalic and atraumatic.   Neck: Neck supple. No JVD present.   Cardiovascular: Normal rate and regular rhythm.    Murmur heard.  High-pitched blowing holosystolic murmur is present with a grade of 3/6  at the apex  Pulses:       Carotid pulses are 2+ on the right side, and 2+ on the left side.       Radial pulses are 2+ on the right side, and 2+ on the left side.        Femoral pulses are 2+ on the right side, and 2+ on the left side.       Posterior tibial pulses are 1+ on the right side, and 1+ on the left side.   Pulmonary/Chest: Effort normal and breath sounds normal.   Abdominal: Soft. Bowel sounds are normal.   Musculoskeletal: He exhibits no edema.   Neurological: He is alert and oriented to person, place, and time.   Skin: Skin is warm and dry.   Psychiatric: He has a normal mood and affect. His behavior is normal. Thought content normal.         Assessment:       1. Chronic combined systolic and diastolic congestive heart failure    2. Ischemic dilated cardiomyopathy    3. CAD S/P percutaneous coronary angioplasty    4. AICD (automatic cardioverter/defibrillator) present    5. Mitral valve insufficiency, unspecified etiology    6. Status post coronary artery stent placement    7. Ventricular tachyarrhythmia    8. VF (ventricular fibrillation)    9. Chronic kidney disease, stage 3    10. Diabetes mellitus type 2, noninsulin dependent      76 y/o male with hx and presentation as above. Slightly improved and continues with NYHA FC III symptoms. Will f/u with Dr Rosario for continued management of HFrEF.        Plan:       -Continue current regimen and clinic appts as scheduled  -f/u in 3 months

## 2018-01-17 ENCOUNTER — PATIENT MESSAGE (OUTPATIENT)
Dept: CARDIOLOGY | Facility: CLINIC | Age: 78
End: 2018-01-17

## 2018-01-17 ENCOUNTER — PATIENT MESSAGE (OUTPATIENT)
Dept: FAMILY MEDICINE | Facility: CLINIC | Age: 78
End: 2018-01-17

## 2018-01-18 ENCOUNTER — TELEPHONE (OUTPATIENT)
Dept: FAMILY MEDICINE | Facility: CLINIC | Age: 78
End: 2018-01-18

## 2018-01-18 RX ORDER — MIRTAZAPINE 7.5 MG/1
7.5 TABLET, FILM COATED ORAL NIGHTLY
Qty: 30 TABLET | Refills: 11 | Status: SHIPPED | OUTPATIENT
Start: 2018-01-18 | End: 2019-01-18

## 2018-01-18 NOTE — TELEPHONE ENCOUNTER
----- Message from Marlena Murillo sent at 1/18/2018  2:08 PM CST -----  Contact: The pt's daughter  Would like  to get something called in to the pharmacy to help his appetite.  [facility]

## 2018-01-18 NOTE — TELEPHONE ENCOUNTER
Remeron sent to the pharmacy and message was responded to on the portal   And I called and notified the pt daughter

## 2018-01-25 ENCOUNTER — HOSPITAL ENCOUNTER (INPATIENT)
Facility: HOSPITAL | Age: 78
LOS: 1 days | Discharge: HOSPICE | DRG: 291 | End: 2018-01-26
Attending: EMERGENCY MEDICINE | Admitting: INTERNAL MEDICINE
Payer: MEDICARE

## 2018-01-25 DIAGNOSIS — I42.0 ISCHEMIC DILATED CARDIOMYOPATHY: Chronic | ICD-10-CM

## 2018-01-25 DIAGNOSIS — I50.40 COMBINED SYSTOLIC AND DIASTOLIC HEART FAILURE, UNSPECIFIED HEART FAILURE CHRONICITY: Primary | ICD-10-CM

## 2018-01-25 DIAGNOSIS — I50.43 ACUTE ON CHRONIC COMBINED SYSTOLIC AND DIASTOLIC HEART FAILURE: ICD-10-CM

## 2018-01-25 DIAGNOSIS — I25.5 ISCHEMIC DILATED CARDIOMYOPATHY: Chronic | ICD-10-CM

## 2018-01-25 DIAGNOSIS — R06.02 SHORTNESS OF BREATH: ICD-10-CM

## 2018-01-25 DIAGNOSIS — I50.42 CHRONIC COMBINED SYSTOLIC AND DIASTOLIC HEART FAILURE: ICD-10-CM

## 2018-01-25 DIAGNOSIS — I50.9 HEART FAILURE: ICD-10-CM

## 2018-01-25 LAB
ALBUMIN SERPL BCP-MCNC: 3.2 G/DL
ALBUMIN SERPL BCP-MCNC: 3.6 G/DL
ALLENS TEST: ABNORMAL
ALP SERPL-CCNC: 103 U/L
ALP SERPL-CCNC: 105 U/L
ALT SERPL W/O P-5'-P-CCNC: 24 U/L
ALT SERPL W/O P-5'-P-CCNC: 40 U/L
ANION GAP SERPL CALC-SCNC: 12 MMOL/L
ANION GAP SERPL CALC-SCNC: 13 MMOL/L
ANISOCYTOSIS BLD QL SMEAR: SLIGHT
ANISOCYTOSIS BLD QL SMEAR: SLIGHT
AST SERPL-CCNC: 23 U/L
AST SERPL-CCNC: 30 U/L
BASOPHILS # BLD AUTO: 0.01 K/UL
BASOPHILS # BLD AUTO: 0.02 K/UL
BASOPHILS NFR BLD: 0.2 %
BASOPHILS NFR BLD: 0.6 %
BILIRUB SERPL-MCNC: 1.5 MG/DL
BILIRUB SERPL-MCNC: 2 MG/DL
BUN SERPL-MCNC: 74 MG/DL
BUN SERPL-MCNC: 76 MG/DL
CALCIUM SERPL-MCNC: 9.2 MG/DL
CALCIUM SERPL-MCNC: 9.5 MG/DL
CHLORIDE SERPL-SCNC: 108 MMOL/L
CHLORIDE SERPL-SCNC: 109 MMOL/L
CO2 SERPL-SCNC: 25 MMOL/L
CO2 SERPL-SCNC: 26 MMOL/L
CREAT SERPL-MCNC: 2.72 MG/DL
CREAT SERPL-MCNC: 3 MG/DL
DELSYS: ABNORMAL
DIFFERENTIAL METHOD: ABNORMAL
DIFFERENTIAL METHOD: ABNORMAL
EOSINOPHIL # BLD AUTO: 0.1 K/UL
EOSINOPHIL # BLD AUTO: 0.1 K/UL
EOSINOPHIL NFR BLD: 1.7 %
EOSINOPHIL NFR BLD: 2.1 %
ERYTHROCYTE [DISTWIDTH] IN BLOOD BY AUTOMATED COUNT: 19.5 %
ERYTHROCYTE [DISTWIDTH] IN BLOOD BY AUTOMATED COUNT: 20.1 %
EST. GFR  (AFRICAN AMERICAN): 22 ML/MIN/1.73 M^2
EST. GFR  (AFRICAN AMERICAN): 25.1 ML/MIN/1.73 M^2
EST. GFR  (NON AFRICAN AMERICAN): 19 ML/MIN/1.73 M^2
EST. GFR  (NON AFRICAN AMERICAN): 21.7 ML/MIN/1.73 M^2
GLUCOSE SERPL-MCNC: 170 MG/DL
GLUCOSE SERPL-MCNC: 198 MG/DL
HCO3 UR-SCNC: 20.3 MMOL/L (ref 24–28)
HCT VFR BLD AUTO: 36.5 %
HCT VFR BLD AUTO: 37.1 %
HCT VFR BLD CALC: 33 %PCV (ref 36–54)
HGB BLD-MCNC: 11 G/DL
HGB BLD-MCNC: 11.1 G/DL
HGB BLD-MCNC: 11.1 G/DL
HYPOCHROMIA BLD QL SMEAR: ABNORMAL
HYPOCHROMIA BLD QL SMEAR: ABNORMAL
INR PPP: 1.3
LYMPHOCYTES # BLD AUTO: 0.8 K/UL
LYMPHOCYTES # BLD AUTO: 1.1 K/UL
LYMPHOCYTES NFR BLD: 24.6 %
LYMPHOCYTES NFR BLD: 27.3 %
MAGNESIUM SERPL-MCNC: 2.4 MG/DL
MCH RBC QN AUTO: 28.2 PG
MCH RBC QN AUTO: 29 PG
MCHC RBC AUTO-ENTMCNC: 29.9 G/DL
MCHC RBC AUTO-ENTMCNC: 30.4 G/DL
MCV RBC AUTO: 94 FL
MCV RBC AUTO: 95 FL
MONOCYTES # BLD AUTO: 0.5 K/UL
MONOCYTES # BLD AUTO: 0.8 K/UL
MONOCYTES NFR BLD: 14.1 %
MONOCYTES NFR BLD: 19.1 %
NEUTROPHILS # BLD AUTO: 2 K/UL
NEUTROPHILS # BLD AUTO: 2.1 K/UL
NEUTROPHILS NFR BLD: 51.7 %
NEUTROPHILS NFR BLD: 58.6 %
NT-PROBNP: ABNORMAL PG/ML
OVALOCYTES BLD QL SMEAR: ABNORMAL
PCO2 BLDA: 25.3 MMHG (ref 35–45)
PH SMN: 7.51 [PH] (ref 7.35–7.45)
PHOSPHATE SERPL-MCNC: 4.4 MG/DL
PLATELET # BLD AUTO: 95 K/UL
PLATELET # BLD AUTO: 98 K/UL
PLATELET BLD QL SMEAR: ABNORMAL
PLATELET BLD QL SMEAR: ABNORMAL
PMV BLD AUTO: ABNORMAL FL
PMV BLD AUTO: ABNORMAL FL
PO2 BLDA: 93 MMHG (ref 80–100)
POC BE: -3 MMOL/L
POC IONIZED CALCIUM: 1.22 MMOL/L (ref 1.06–1.42)
POC SATURATED O2: 98 % (ref 95–100)
POC TCO2: 21 MMOL/L (ref 23–27)
POIKILOCYTOSIS BLD QL SMEAR: SLIGHT
POTASSIUM BLD-SCNC: 3.4 MMOL/L (ref 3.5–5.1)
POTASSIUM SERPL-SCNC: 3.6 MMOL/L
POTASSIUM SERPL-SCNC: 4 MMOL/L
PROT SERPL-MCNC: 6.7 G/DL
PROT SERPL-MCNC: 6.7 G/DL
PROTHROMBIN TIME: 14.6 SEC
RBC # BLD AUTO: 3.83 M/UL
RBC # BLD AUTO: 3.93 M/UL
SAMPLE: ABNORMAL
SCHISTOCYTES BLD QL SMEAR: PRESENT
SITE: ABNORMAL
SODIUM BLD-SCNC: 144 MMOL/L (ref 136–145)
SODIUM SERPL-SCNC: 146 MMOL/L
SODIUM SERPL-SCNC: 147 MMOL/L
TARGETS BLD QL SMEAR: ABNORMAL
TROPONIN I SERPL DL<=0.01 NG/ML-MCNC: 0.06 NG/ML
WBC # BLD AUTO: 3.34 K/UL
WBC # BLD AUTO: 4.03 K/UL

## 2018-01-25 PROCEDURE — 83735 ASSAY OF MAGNESIUM: CPT

## 2018-01-25 PROCEDURE — 85025 COMPLETE CBC W/AUTO DIFF WBC: CPT | Mod: 91

## 2018-01-25 PROCEDURE — 25000003 PHARM REV CODE 250: Performed by: INTERNAL MEDICINE

## 2018-01-25 PROCEDURE — 93010 ELECTROCARDIOGRAM REPORT: CPT | Mod: ,,, | Performed by: INTERNAL MEDICINE

## 2018-01-25 PROCEDURE — 93005 ELECTROCARDIOGRAM TRACING: CPT

## 2018-01-25 PROCEDURE — 36415 COLL VENOUS BLD VENIPUNCTURE: CPT

## 2018-01-25 PROCEDURE — 25000003 PHARM REV CODE 250: Performed by: EMERGENCY MEDICINE

## 2018-01-25 PROCEDURE — 84100 ASSAY OF PHOSPHORUS: CPT

## 2018-01-25 PROCEDURE — 83880 ASSAY OF NATRIURETIC PEPTIDE: CPT

## 2018-01-25 PROCEDURE — 36600 WITHDRAWAL OF ARTERIAL BLOOD: CPT

## 2018-01-25 PROCEDURE — 84484 ASSAY OF TROPONIN QUANT: CPT

## 2018-01-25 PROCEDURE — 80053 COMPREHEN METABOLIC PANEL: CPT | Mod: 91

## 2018-01-25 PROCEDURE — 85610 PROTHROMBIN TIME: CPT

## 2018-01-25 PROCEDURE — A4216 STERILE WATER/SALINE, 10 ML: HCPCS | Performed by: EMERGENCY MEDICINE

## 2018-01-25 PROCEDURE — 94760 N-INVAS EAR/PLS OXIMETRY 1: CPT

## 2018-01-25 PROCEDURE — 85025 COMPLETE CBC W/AUTO DIFF WBC: CPT

## 2018-01-25 PROCEDURE — 80053 COMPREHEN METABOLIC PANEL: CPT

## 2018-01-25 PROCEDURE — 20000000 HC ICU ROOM

## 2018-01-25 PROCEDURE — 82803 BLOOD GASES ANY COMBINATION: CPT

## 2018-01-25 PROCEDURE — 99285 EMERGENCY DEPT VISIT HI MDM: CPT | Mod: 25

## 2018-01-25 RX ORDER — ATORVASTATIN CALCIUM 20 MG/1
20 TABLET, FILM COATED ORAL NIGHTLY
Status: DISCONTINUED | OUTPATIENT
Start: 2018-01-25 | End: 2018-01-26 | Stop reason: HOSPADM

## 2018-01-25 RX ORDER — PANTOPRAZOLE SODIUM 40 MG/1
40 TABLET, DELAYED RELEASE ORAL DAILY
Status: DISCONTINUED | OUTPATIENT
Start: 2018-01-26 | End: 2018-01-26 | Stop reason: HOSPADM

## 2018-01-25 RX ORDER — CLOPIDOGREL BISULFATE 75 MG/1
75 TABLET ORAL DAILY
Status: DISCONTINUED | OUTPATIENT
Start: 2018-01-26 | End: 2018-01-26 | Stop reason: HOSPADM

## 2018-01-25 RX ORDER — ESCITALOPRAM OXALATE 10 MG/1
10 TABLET ORAL DAILY
Status: DISCONTINUED | OUTPATIENT
Start: 2018-01-26 | End: 2018-01-26 | Stop reason: HOSPADM

## 2018-01-25 RX ORDER — FUROSEMIDE 40 MG/1
40 TABLET ORAL 2 TIMES DAILY
Status: DISCONTINUED | OUTPATIENT
Start: 2018-01-25 | End: 2018-01-26 | Stop reason: HOSPADM

## 2018-01-25 RX ORDER — SODIUM CHLORIDE 0.9 % (FLUSH) 0.9 %
3 SYRINGE (ML) INJECTION EVERY 8 HOURS
Status: DISCONTINUED | OUTPATIENT
Start: 2018-01-25 | End: 2018-01-26 | Stop reason: HOSPADM

## 2018-01-25 RX ORDER — NAPROXEN SODIUM 220 MG/1
81 TABLET, FILM COATED ORAL DAILY
Status: DISCONTINUED | OUTPATIENT
Start: 2018-01-26 | End: 2018-01-26 | Stop reason: HOSPADM

## 2018-01-25 RX ORDER — CARVEDILOL 12.5 MG/1
12.5 TABLET ORAL 2 TIMES DAILY
Status: DISCONTINUED | OUTPATIENT
Start: 2018-01-25 | End: 2018-01-26 | Stop reason: HOSPADM

## 2018-01-25 RX ORDER — MIRTAZAPINE 7.5 MG/1
7.5 TABLET, FILM COATED ORAL NIGHTLY PRN
Status: DISCONTINUED | OUTPATIENT
Start: 2018-01-25 | End: 2018-01-26 | Stop reason: HOSPADM

## 2018-01-25 RX ORDER — FINASTERIDE 5 MG/1
5 TABLET, FILM COATED ORAL DAILY
Status: DISCONTINUED | OUTPATIENT
Start: 2018-01-26 | End: 2018-01-26 | Stop reason: HOSPADM

## 2018-01-25 RX ORDER — ALLOPURINOL 100 MG/1
100 TABLET ORAL DAILY
Status: DISCONTINUED | OUTPATIENT
Start: 2018-01-26 | End: 2018-01-26 | Stop reason: HOSPADM

## 2018-01-25 RX ORDER — TAMSULOSIN HYDROCHLORIDE 0.4 MG/1
0.4 CAPSULE ORAL NIGHTLY
Status: DISCONTINUED | OUTPATIENT
Start: 2018-01-25 | End: 2018-01-26 | Stop reason: HOSPADM

## 2018-01-25 RX ORDER — FLUTICASONE FUROATE AND VILANTEROL 100; 25 UG/1; UG/1
1 POWDER RESPIRATORY (INHALATION) DAILY
Status: DISCONTINUED | OUTPATIENT
Start: 2018-01-26 | End: 2018-01-26 | Stop reason: HOSPADM

## 2018-01-25 RX ADMIN — ATORVASTATIN CALCIUM 20 MG: 20 TABLET, FILM COATED ORAL at 09:01

## 2018-01-25 RX ADMIN — CARVEDILOL 12.5 MG: 12.5 TABLET, FILM COATED ORAL at 09:01

## 2018-01-25 RX ADMIN — MIRTAZAPINE 7.5 MG: 7.5 TABLET ORAL at 10:01

## 2018-01-25 RX ADMIN — TAMSULOSIN HYDROCHLORIDE 0.4 MG: 0.4 CAPSULE ORAL at 09:01

## 2018-01-25 RX ADMIN — SODIUM CHLORIDE, PRESERVATIVE FREE 3 ML: 5 INJECTION INTRAVENOUS at 10:01

## 2018-01-25 RX ADMIN — FUROSEMIDE 40 MG: 40 TABLET ORAL at 09:01

## 2018-01-25 NOTE — ED NOTES
PT PRESENTS TO ED PER FAMILY WITH C/O INCREASED SOB OVER PAST SEVERAL WEEKS. PT HAS SIGNIFICANT CARDIAC Hx. PT HAS DEFIB. IMPLANTED TO LEFT CHEST. PT IN PACED RHYTHM ON CARDIAC MONITOR.

## 2018-01-25 NOTE — ED PROVIDER NOTES
Encounter Date: 1/25/2018       History     Chief Complaint   Patient presents with    Shortness of Breath     mild sob x 2 days with dry cough. hx of chf- pt takes lasix-reports compliance     78 yo M presents to the ED with SOB. He has a history of both systolic and diastolic HF, pulm HTN and CAD, T2DM follows with Dr Roe, has a defibrillator here with 2 weeks of worsening SOB and tachypnea. According to his family, he hasn't been eating or drinking. Has been fearful of getting too much fluid on him. Denies abd pain, nausea or vomiting. No chest pain. Denies neck stiffness or body aches. Met with HTS Dr Rosario 2 weeks ago, and needed a bump up in his BP. He is not a candidate for advanced HF treatment.       The history is provided by the patient and a relative.     Review of patient's allergies indicates:   Allergen Reactions    Antihistamines - alkylamine      Due to prostate     Sulfa (sulfonamide antibiotics) Hives     Past Medical History:   Diagnosis Date    Chronic combined systolic and diastolic congestive heart failure     Coronary artery disease     Diabetes mellitus, type II     Hyperlipidemia     Hypertension     Primary cardiomyopathy     Valvular regurgitation     m/s MR    Ventricular tachycardia      Past Surgical History:   Procedure Laterality Date    CARDIAC DEFIBRILLATOR PLACEMENT  10/2/2008; 1/5/2015    [employer]; converted to biventricular ICD VIVA QUAD XT CRT-D [id number]: Serial No. [id number]    CARDIAC DEFIBRILLATOR PLACEMENT      CORONARY ANGIOPLASTY WITH STENT PLACEMENT  4/15/2008; 10/16/2014    LAD; RCA    KNEE SURGERY      left knee     Family History   Problem Relation Age of Onset    Diabetes Maternal Aunt      Social History   Substance Use Topics    Smoking status: Never Smoker    Smokeless tobacco: Never Used    Alcohol use No     Review of Systems   Endocrine: Negative.    Allergic/Immunologic: Negative.    Neurological: Negative.    All other systems reviewed  and are negative.      Physical Exam     Initial Vitals [01/25/18 1330]   BP Pulse Resp Temp SpO2   102/63 60 (!) 24 97.5 °F (36.4 °C) 100 %      MAP       76         Physical Exam    Nursing note and vitals reviewed.  Constitutional: He appears well-developed and well-nourished.   HENT:   Head: Normocephalic.   Eyes: EOM are normal. Pupils are equal, round, and reactive to light.   Neck: Neck supple.   Cardiovascular: Normal rate.   Murmur heard.  Pulmonary/Chest: He is in respiratory distress.   He is tachypneic   Abdominal: Soft.   Neurological: He is alert and oriented to person, place, and time.   Skin: Skin is warm.   Psychiatric: He has a normal mood and affect. His behavior is normal. Thought content normal.         ED Course   Procedures  Labs Reviewed   CBC W/ AUTO DIFFERENTIAL - Abnormal; Notable for the following:        Result Value    WBC 3.34 (*)     RBC 3.83 (*)     Hemoglobin 11.1 (*)     Hematocrit 36.5 (*)     MCHC 30.4 (*)     RDW 20.1 (*)     Platelets 98 (*)     Lymph # 0.8 (*)     Platelet Estimate Decreased (*)     All other components within normal limits   COMPREHENSIVE METABOLIC PANEL - Abnormal; Notable for the following:     Sodium 147 (*)     Glucose 198 (*)     BUN, Bld 74 (*)     Creatinine 2.72 (*)     Total Bilirubin 1.5 (*)     eGFR if  25.1 (*)     eGFR if non  21.7 (*)     All other components within normal limits   TROPONIN I - Abnormal; Notable for the following:     Troponin I 0.058 (*)     All other components within normal limits   PROTIME-INR - Abnormal; Notable for the following:     Prothrombin Time 14.6 (*)     INR 1.3 (*)     All other components within normal limits   NT-PRO NATRIURETIC PEPTIDE - Abnormal; Notable for the following:     NT-proBNP 21677 (*)     All other components within normal limits   ISTAT PROCEDURE - Abnormal; Notable for the following:     POC PH 7.514 (*)     POC PCO2 25.3 (*)     POC HCO3 20.3 (*)     POC  Potassium 3.4 (*)     POC TCO2 21 (*)     POC Hematocrit 33 (*)     All other components within normal limits             Medical Decision Making:   History:   Old Medical Records: I decided to obtain old medical records.  Old Records Summarized: records from previous admission(s).       <> Summary of Records: CONCLUSIONS     1 - Severely depressed left ventricular systolic function (EF 20-25%).     2 - Biatrial enlargement.     3 - Mild left ventricular enlargement.     4 - Wall motion abnormalities.     5 - Right ventricular enlargement with mildly depressed systolic function.     6 - Pulmonary hypertension. The estimated PA systolic pressure is 102 mmHg.     7 - The mitral valve is thickened with evidence of tethered posterior leaflet.     8 - Severe mitral regurgitation.     9 - Moderate tricuspid regurgitation.     10 - Increased central venous pressure.     Initial Assessment:   78 yo M with severely reduced EF with pulm HTN and severe MR here with SOB ongoing x 2 weeks, with exertion and laying flat. Follows with Dr Roe, Dr Rosario and Dr Castellanos  Differential Diagnosis:   End stage heart failure, anemia, infectious, acidosis  Clinical Tests:   Lab Tests: Ordered and Reviewed  Radiological Study: Ordered and Reviewed  Medical Tests: Ordered and Reviewed  ED Management:  I had an extensive conversation with Trever, and all the family members, who were in the [facility] emergency department.  I discussed with him the end-stage condition of his heart.  I went over the the treatment plan that he would like us to care for him, and answered all questions regarding intubation compression versus vasoactive drips.  Patient, and the sound state of mind, does not want intubation or compressions at this time.  He is amenable to getting medications through the IV.  He will be transferred to [place], in the ICU, to be taking care of with Dr. aubrey Barney do.  At this point he does not want BiPAP.                   ED Course  as of Jan 26 1310   Thu Jan 25, 2018   1415 Trying to get in touch with Dr Rosario to discuss where patient should be transferred, [place] vs [facility]  [MG]   1536 Patient has filled out his DNR/DNI, he is amenable to vasoactive drips and medications, doses of epi and atropine. He does not want intubation or chest compressions  [MG]   1537 Awaiting transfer to [place], spoke with Dr Reid. He does not want Bipap at this time, is actually looking more comfortable  [MG]      ED Course User Index  [MG] Vandana Castro MD     Clinical Impression:   The primary encounter diagnosis was Combined systolic and diastolic heart failure, unspecified heart failure chronicity. Diagnoses of Shortness of breath, Heart failure, Chronic combined systolic and diastolic heart failure, and Ischemic dilated cardiomyopathy were also pertinent to this visit.                           Vandana Castro MD  01/26/18 6101

## 2018-01-25 NOTE — SIGNIFICANT EVENT
Results for ADAM PANTOAJ (MRN [id number]) as of 1/25/2018 14:15   Ref. Range 1/25/2018 14:09   POC Sodium Latest Ref Range: 136 - 145 mmol/L 144   POC Potassium Latest Ref Range: 3.5 - 5.1 mmol/L 3.4 (L)   POC Ionized Calcium Latest Ref Range: 1.06 - 1.42 mmol/L 1.22   POC Hematocrit Latest Ref Range: 36 - 54 %PCV 33 (L)   POC HEMOGLOBIN Latest Units: g/dL 11   POC PH Latest Ref Range: 7.35 - 7.45  7.514 (H)   POC PCO2 Latest Ref Range: 35 - 45 mmHg 25.3 (LL)   POC PO2 Latest Ref Range: 80 - 100 mmHg 93   POC BE Latest Ref Range: -2 to 2 mmol/L -3   POC HCO3 Latest Ref Range: 24 - 28 mmol/L 20.3 (L)   POC SATURATED O2 Latest Ref Range: 95 - 100 % 98   POC TCO2 Latest Ref Range: 23 - 27 mmol/L 21 (L)   Sample Unknown ARTERIAL   DelSys Unknown Room Air   Allens Test Unknown Pass   Site Unknown RR   Results reported to Dr. Vandana Castro in physician [facility] on 1/25/18 @5863 by [facility].

## 2018-01-25 NOTE — ED NOTES
PT SLEEPING. NO DISTRESS NOTED. REPORT CALLED TO [facility] ICU. [facility] AMBULANCE SERVICE NOTIFIED.

## 2018-01-26 VITALS
OXYGEN SATURATION: 99 % | DIASTOLIC BLOOD PRESSURE: 39 MMHG | WEIGHT: 136.44 LBS | HEART RATE: 64 BPM | SYSTOLIC BLOOD PRESSURE: 56 MMHG | RESPIRATION RATE: 30 BRPM | TEMPERATURE: 98 F | HEIGHT: 68 IN | BODY MASS INDEX: 20.68 KG/M2

## 2018-01-26 PROBLEM — I50.43 ACUTE ON CHRONIC COMBINED SYSTOLIC AND DIASTOLIC HEART FAILURE: Status: ACTIVE | Noted: 2018-01-25

## 2018-01-26 LAB
ANION GAP SERPL CALC-SCNC: 14 MMOL/L
BNP SERPL-MCNC: >4900 PG/ML
BUN SERPL-MCNC: 80 MG/DL
CALCIUM SERPL-MCNC: 9.2 MG/DL
CHLORIDE SERPL-SCNC: 109 MMOL/L
CK SERPL-CCNC: 52 U/L
CO2 SERPL-SCNC: 22 MMOL/L
CREAT SERPL-MCNC: 3.1 MG/DL
EST. GFR  (AFRICAN AMERICAN): 21 ML/MIN/1.73 M^2
EST. GFR  (NON AFRICAN AMERICAN): 19 ML/MIN/1.73 M^2
GLUCOSE SERPL-MCNC: 195 MG/DL
POTASSIUM SERPL-SCNC: 4 MMOL/L
SODIUM SERPL-SCNC: 145 MMOL/L
TROPONIN I SERPL DL<=0.01 NG/ML-MCNC: 0.09 NG/ML

## 2018-01-26 PROCEDURE — 83880 ASSAY OF NATRIURETIC PEPTIDE: CPT

## 2018-01-26 PROCEDURE — 99223 1ST HOSP IP/OBS HIGH 75: CPT | Mod: ,,, | Performed by: INTERNAL MEDICINE

## 2018-01-26 PROCEDURE — 94761 N-INVAS EAR/PLS OXIMETRY MLT: CPT

## 2018-01-26 PROCEDURE — 25000003 PHARM REV CODE 250: Performed by: INTERNAL MEDICINE

## 2018-01-26 PROCEDURE — 25000242 PHARM REV CODE 250 ALT 637 W/ HCPCS: Performed by: INTERNAL MEDICINE

## 2018-01-26 PROCEDURE — 93005 ELECTROCARDIOGRAM TRACING: CPT

## 2018-01-26 PROCEDURE — 94640 AIRWAY INHALATION TREATMENT: CPT

## 2018-01-26 PROCEDURE — 84484 ASSAY OF TROPONIN QUANT: CPT

## 2018-01-26 PROCEDURE — 36415 COLL VENOUS BLD VENIPUNCTURE: CPT

## 2018-01-26 PROCEDURE — 80048 BASIC METABOLIC PNL TOTAL CA: CPT

## 2018-01-26 PROCEDURE — 82550 ASSAY OF CK (CPK): CPT

## 2018-01-26 RX ADMIN — CLOPIDOGREL BISULFATE 75 MG: 75 TABLET ORAL at 08:01

## 2018-01-26 RX ADMIN — FLUTICASONE FUROATE AND VILANTEROL TRIFENATATE 1 PUFF: 100; 25 POWDER RESPIRATORY (INHALATION) at 08:01

## 2018-01-26 RX ADMIN — ESCITALOPRAM 10 MG: 10 TABLET, FILM COATED ORAL at 08:01

## 2018-01-26 RX ADMIN — FINASTERIDE 5 MG: 5 TABLET, FILM COATED ORAL at 08:01

## 2018-01-26 RX ADMIN — PANTOPRAZOLE SODIUM 40 MG: 40 TABLET, DELAYED RELEASE ORAL at 08:01

## 2018-01-26 RX ADMIN — FUROSEMIDE 40 MG: 40 TABLET ORAL at 08:01

## 2018-01-26 RX ADMIN — ASPIRIN 81 MG 81 MG: 81 TABLET ORAL at 08:01

## 2018-01-26 RX ADMIN — ALLOPURINOL 100 MG: 100 TABLET ORAL at 08:01

## 2018-01-26 RX ADMIN — CARVEDILOL 12.5 MG: 12.5 TABLET, FILM COATED ORAL at 08:01

## 2018-01-26 NOTE — ASSESSMENT & PLAN NOTE
-creatinine 3.1  -up from baseline of 1.6-2.0  -likely cardiorenal in nature  -will continue oral Lasix BID with prn doses for comfort per hospice

## 2018-01-26 NOTE — ASSESSMENT & PLAN NOTE
-s/p RCA ÁLVARO in 2014  -mild troponin rise related to ADHF  -continue conservative medical management with no aggressive treatment given patient wishes

## 2018-01-26 NOTE — ASSESSMENT & PLAN NOTE
-due to CHF and history of VT  -NSVT on telemetry this AM  -AICD deactivated due to hospice wishes

## 2018-01-26 NOTE — PLAN OF CARE
Problem: Patient Care Overview  Goal: Plan of Care Review  Outcome: Ongoing (interventions implemented as appropriate)  Pt on room air in no apparent distress.  MDI tx. Given with good pt. Effort.  Will cont. To monitor.

## 2018-01-26 NOTE — HOSPITAL COURSE
1/25/2018 Presented to the ER with complaints of SOB with progressive worsening. CXR with mild pulmonary vascular congestion. NT pro BNP 40741 with marginal BP. Transferred from [facility] ER for admission to ICU for ADHF   1/26/2018 Repeat labs this AM with  creatinine 3.1 higher than baseline (1.6-2.0)with total bili 2.0 (baseline 1.5). BNP >4900 with troponin .085. Tachypnea noted at rest and with talking. Breath sounds diminished. BP marginal. Family (2 daughters and wife) at bedside with goals of care discussion with expression for wishes of full DNR and initiation of hospice. Case management consulted and home hospice placement initiated

## 2018-01-26 NOTE — H&P
[facility]  Cardiology  History and Physical     Patient Name: Trever Claros  MRN: [id number]  Admission Date: 1/25/2018  Code Status: DNR   Attending Provider: No att. providers found   Primary Care Physician: Wiliam Mtz MD  Principal Problem:Acute on chronic combined systolic and diastolic heart failure    Patient information was obtained from patient, relative(s) and past medical records.     Subjective:     Chief Complaint:  SOB; ADHF      HPI:  76 y/o male with hx of HTN, DM, HLP, CKD, CAD s/p PCI 2008 and 2014, HFrEF with ICM with functional MR with last EF 30% (improved from 10%), s/p CRT-D (BiV upgrade Jan 2015 by Dr. Myers), hx of Vtach, moderate to severe MR per last 2DE, PH with PASP >70, GERD who presents to the ER with complaints of SOB. Mr. Claros complains of SOB with minimal exertion for a few weeks with progressive worsening last night. He is accompaniedy by 2 of his daughters and his wife. He reports his SOB would typically ease with rest however yesterday evening it was persisting and not relieving. He did not want to come to the hospital however his family convinced him due to their concern for his acute issues. He reports decreased appetite with 20lb weight loss over the past month, abdominal bloating, weakness and fatigue. His family feels he has definitely declined over the past several months and is not able to do things as he did before. He was seen by the HF clinic as well as the  IC clinic for evaluation of MR however was not a candidate for mitral clip due to his functional MR and was continued on his current medication regimen per the HF clinic    His family initiated a conversation with me today in regards to goals of care and expressed a desire for discussion of hospice. They all definitely had a great grasp of his severe cardiac condition and were all in agreement of proceeding with hospice      Hospital Course:  1/25/2018 Presented to the ER with  complaints of SOB with progressive worsening. CXR with mild pulmonary vascular congestion. NT pro BNP 33054 with marginal BP. Transferred from [facility] ER for admission to ICU for ADHF   1/26/2018 Repeat labs this AM with  creatinine 3.1 higher than baseline (1.6-2.0)with total bili 2.0 (baseline 1.5). BNP >4900 with troponin .085. Tachypnea noted at rest and with talking. Breath sounds diminished. BP marginal. Family (2 daughters and wife) at bedside with goals of care discussion with expression for wishes of full DNR and initiation of hospice. Case management consulted and home hospice placement initiated       Past Medical History:   Diagnosis Date    Chronic combined systolic and diastolic congestive heart failure     Coronary artery disease     Diabetes mellitus, type II     Hyperlipidemia     Hypertension     Primary cardiomyopathy     Valvular regurgitation     m/s MR    Ventricular tachycardia        Past Surgical History:   Procedure Laterality Date    CARDIAC DEFIBRILLATOR PLACEMENT  10/2/2008; 1/5/2015    [employer]; converted to biventricular ICD VIVA QUAD XT CRT-D [id number]: Serial No. [id number]    CARDIAC DEFIBRILLATOR PLACEMENT      CORONARY ANGIOPLASTY WITH STENT PLACEMENT  4/15/2008; 10/16/2014    LAD; RCA    KNEE SURGERY      left knee       Review of patient's allergies indicates:   Allergen Reactions    Antihistamines - alkylamine      Due to prostate     Sulfa (sulfonamide antibiotics) Hives       No current facility-administered medications on file prior to encounter.      Current Outpatient Prescriptions on File Prior to Encounter   Medication Sig    allopurinol (ZYLOPRIM) 100 MG tablet Take 100 mg by mouth once daily.    alprazolam (XANAX) 0.25 MG tablet Take 1 tablet (0.25 mg total) by mouth nightly as needed for Anxiety.    aspirin (ECOTRIN) 81 MG EC tablet Take 81 mg by mouth once daily.    atorvastatin (LIPITOR) 20 MG tablet Take 1 tablet (20 mg total) by mouth every evening.     carvedilol (COREG) 12.5 MG tablet Take 1 tablet by mouth 2 (two) times daily.    clopidogrel (PLAVIX) 75 mg tablet Take 1 tablet (75 mg total) by mouth once daily.    ENTRESTO 49-51 mg per tablet Take 1 tablet by mouth 2 (two) times daily.    escitalopram oxalate (LEXAPRO) 10 MG tablet Take 1 tablet (10 mg total) by mouth once daily. For depression    finasteride (PROSCAR) 5 mg tablet Take 5 mg by mouth once daily.    fluticasone-salmeterol 250-50 mcg/dose (ADVAIR) 250-50 mcg/dose diskus inhaler Inhale 1 puff into the lungs 2 (two) times daily. Controller    furosemide (LASIX) 20 MG tablet Take 2 tablets (40 mg total) by mouth every 12 (twelve) hours. For fluid    mirtazapine (REMERON) 7.5 MG Tab Take 1 tablet (7.5 mg total) by mouth every evening.    omeprazole (PRILOSEC) 20 MG capsule Take 1 capsule (20 mg total) by mouth 2 (two) times daily.    saw palmetto 500 MG capsule Take 450 mg by mouth once daily.     tamsulosin (FLOMAX) 0.4 mg Cp24 Take 1 capsule (0.4 mg total) by mouth every evening.    URELLE 81-0.12 mg Tab Take 1 tablet by mouth as needed.     nitroGLYCERIN (NITROSTAT) 0.4 MG SL tablet Place 1 tablet (0.4 mg total) under the tongue every 5 (five) minutes as needed for Chest pain (maximum 3 doses in 24 hours).    polyethylene glycol (GLYCOLAX) 17 gram/dose powder TAKE 17 GRAMS BY MOUTH NIGHTLY    [DISCONTINUED] sacubitril-valsartan (ENTRESTO)  mg per tablet Take 1 tablet by mouth 2 (two) times daily.     Family History     Problem Relation (Age of Onset)    Diabetes Maternal Aunt        Social History Main Topics    Smoking status: Never Smoker    Smokeless tobacco: Never Used    Alcohol use No    Drug use: No    Sexual activity: Not on file     Review of Systems   Constitution: Positive for decreased appetite, weakness, malaise/fatigue and weight loss. Negative for chills, diaphoresis and fever.   Cardiovascular: Positive for dyspnea on exertion, orthopnea and paroxysmal  nocturnal dyspnea. Negative for chest pain, claudication, cyanosis, irregular heartbeat, leg swelling, near-syncope, palpitations and syncope.   Respiratory: Positive for shortness of breath. Negative for cough, hemoptysis and wheezing.    Gastrointestinal: Positive for bloating. Negative for abdominal pain, constipation, diarrhea, melena, nausea and vomiting.   Neurological: Negative for dizziness.     Objective:     Vital Signs (Most Recent):  Temp: 98 °F (36.7 °C) (01/26/18 1115)  Pulse: 64 (01/26/18 1130)  Resp: (!) 30 (01/26/18 1100)  BP: (!) 56/39 (01/26/18 1130)  SpO2: 99 % (01/26/18 1130) Vital Signs (24h Range):  Temp:  [97 °F (36.1 °C)-98 °F (36.7 °C)] 98 °F (36.7 °C)  Pulse:  [64-85] 64  Resp:  [6-45] 30  SpO2:  [85 %-100 %] 99 %  BP: ()/(39-71) 56/39     Weight: 61.9 kg (136 lb 7.4 oz)  Body mass index is 20.75 kg/m².    SpO2: 99 %  O2 Device (Oxygen Therapy): room air      Intake/Output Summary (Last 24 hours) at 01/26/18 1409  Last data filed at 01/26/18 1100   Gross per 24 hour   Intake              120 ml   Output              200 ml   Net              -80 ml       Lines/Drains/Airways     Peripheral Intravenous Line                 Peripheral IV - Single Lumen 01/25/18 1409 Right Forearm 1 day                Physical Exam   Constitutional: He has a sickly appearance. He appears ill.   Cardiovascular: Normal rate and regular rhythm.  Exam reveals no gallop.    No murmur heard.  Pulmonary/Chest: Effort normal. He has decreased breath sounds.       Significant Labs:       Recent Labs  Lab 01/25/18 1956 01/26/18  0442   * 145   K 4.0 4.0    109   CO2 25 22*   BUN 76* 80*   CREATININE 3.0* 3.1*   MG 2.4  --        Recent Labs  Lab 01/25/18 1956   WBC 4.03   RBC 3.93*   HGB 11.1*   HCT 37.1*   PLT 95*   MCV 94   MCH 28.2   MCHC 29.9*       Significant Imaging: Echocardiogram:   2D echo with color flow doppler:   Results for orders placed or performed during the hospital encounter of  01/09/18   2D Echo w/ Color Flow Doppler   Result Value Ref Range    EF 20 (A) 55 - 65    Mitral Valve Regurgitation SEVERE (A)     Est. PA Systolic Pressure 101.86 (A)     Mitral Valve Mobility TETHERED POSTERIOR LEAFLET     Tricuspid Valve Regurgitation MODERATE (A)      Assessment and Plan:     * Acute on chronic combined systolic and diastolic heart failure    -echo with severely depressed LV function with EF 20% with severe MR and PHTN  -BNP >4900 with mild pulmonary vascular congestion on CXR  -tachypneic at rest  -endorses NYHA class IV HF symptoms  -overall prognosis very poor given severity of HR and MR; patient and family requesting hospice which is reasonable         Chronic kidney disease, stage 3    -creatinine 3.1  -up from baseline of 1.6-2.0  -likely cardiorenal in nature  -will continue oral Lasix BID with prn doses for comfort per hospice         Ventricular tachycardia    -NSVT noted on telemetry  -K+ WNL this AM  -will deactivate AICD due to wishes for hospice         CAD S/P percutaneous coronary angioplasty    -s/p RCA ÁLVARO in 2014  -mild troponin rise related to ADHF  -continue conservative medical management with no aggressive treatment given patient wishes         Mitral regurgitation    -severe per echo with severely depressed LVEF  -functional in nature and not a candidate for mitral clip  -continue current medication regimen         AICD (automatic cardioverter/defibrillator) present    -due to CHF and history of VT  -NSVT on telemetry this AM  -AICD deactivated due to hospice wishes         Ischemic dilated cardiomyopathy    -management as detailed previously             VTE Risk Mitigation         Ordered     Medium Risk of VTE  Once      01/25/18 1844     Place RODRIGO hose  Until discontinued      01/25/18 1844     Place sequential compression device  Until discontinued      01/25/18 1844          EMILY Buckley, ANP  Cardiology   [facility]

## 2018-01-26 NOTE — ASSESSMENT & PLAN NOTE
-severe per echo with severely depressed LVEF  -functional in nature and not a candidate for mitral clip  -continue current medication regimen

## 2018-01-26 NOTE — PLAN OF CARE
Dee Dee from [facility] informed TN all paperwork completed and pt can be discharged home. [employer] representative at bedside to turn off defibrillator. TN called [facility] service and  arranged transport to home and was informed they should be here within the hour. ASHLEY Lees ICU Nurse informed as well as pt, pt's spouse , and daughters. No further needs.     01/26/18 1116   Final Note   Assessment Type Final Discharge Note   [facility]position [facility]   What phone number can be called within the next 1-3 days to see how you are doing after discharge? [phone]   Hospital Follow Up  Appt(s) scheduled? No  (hospice)   Discharge plans and expectations educations in teach back method with documentation complete? Yes   Right Care Referral Info   Post Acute Recommendation Other   Referral Type home hospice   Facility Name [facility]

## 2018-01-26 NOTE — DISCHARGE SUMMARY
[facility]  Cardiology  Discharge Summary      Patient Name: Trever Claros  MRN: [id number]  Admission Date: 1/25/2018  Hospital Length of Stay: 1 days  Discharge Date and Time: 1/26/2018 12:32 PM  Attending Physician: No att. providers found  Discharging Provider: EMILY Buckley, ANP  Primary Care Physician: Wiliam Mtz MD    HPI:  HPI:  78 y/o male with hx of HTN, DM, HLP, CKD, CAD s/p PCI 2008 and 2014, HFrEF with ICM with functional MR with last EF 30% (improved from 10%), s/p CRT-D (BiV upgrade Jan 2015 by Dr. Myers), hx of Vtach, moderate to severe MR per last 2DE, PH with PASP >70, GERD who presents to the ER with complaints of SOB. Mr. Claros complains of SOB with minimal exertion for a few weeks with progressive worsening last night. He is accompaniedy by 2 of his daughters and his wife. He reports his SOB would typically ease with rest however yesterday evening it was persisting and not relieving. He did not want to come to the hospital however his family convinced him due to their concern for his acute issues. He reports decreased appetite with 20lb weight loss over the past month, abdominal bloating, weakness and fatigue. His family feels he has definitely declined over the past several months and is not able to do things as he did before. He was seen by the HF clinic as well as the  IC clinic for evaluation of MR however was not a candidate for mitral clip due to his functional MR and was continued on his current medication regimen per the HF clinic     His family initiated a conversation with me today in regards to goals of care and expressed a desire for discussion of hospice. They all definitely had a great grasp of his severe cardiac condition and were all in agreement of proceeding with hospice             * No surgery found *     Indwelling Lines/Drains at time of discharge:  Lines/Drains/Airways          No matching active lines, drains, or airways           Hospital Course     1/25/2018 Presented to the ER with complaints of SOB with progressive worsening. CXR with mild pulmonary vascular congestion. NT pro BNP 98744 with marginal BP. Transferred from [facility] ER for admission to ICU for ADHF   1/26/2018 Repeat labs this AM with  creatinine 3.1 higher than baseline (1.6-2.0)with total bili 2.0 (baseline 1.5). BNP >4900 with troponin .085. Tachypnea noted at rest and with talking. Breath sounds diminished. BP marginal. Family (2 daughters and wife) at bedside with goals of care discussion with expression for wishes of full DNR and initiation of hospice. Case management consulted and home hospice placement initiated. [facility] consulted and met with family at the bedside in ICU. Patient/family in agreement and ready for discharge home with hospice. [employer] contacted and AICD deactivated due to wishes for hospice. Appropriate orders written and further orders per hospice     Consults:   Consults         Status Ordering Provider     IP consult to case management  Once     Provider:  (Not yet assigned)    Acknowledged DAVIDE DUNCAN          Significant Diagnostic Studies: Cardiac Graphics: Echocardiogram:   2D echo with color flow doppler:   Results for orders placed or performed during the hospital encounter of 01/09/18   2D Echo w/ Color Flow Doppler   Result Value Ref Range    EF 20 (A) 55 - 65    Mitral Valve Regurgitation SEVERE (A)     Est. PA Systolic Pressure 101.86 (A)     Mitral Valve Mobility TETHERED POSTERIOR LEAFLET     Tricuspid Valve Regurgitation MODERATE (A)        Pending Diagnostic Studies:     None          Final Active Diagnoses:    Diagnosis Date Noted POA    PRINCIPAL PROBLEM:  Acute on chronic combined systolic and diastolic heart failure [I50.43] 01/25/2018 Yes    Chronic kidney disease, stage 3 [N18.3] 10/09/2014 Yes     Chronic    CAD S/P percutaneous coronary angioplasty [I25.10, Z98.61]  Not Applicable     Chronic     Ventricular tachycardia [I47.2]  Yes    Mitral regurgitation [I34.0] 09/25/2012 Yes     Chronic    Ischemic dilated cardiomyopathy [I25.5, I42.0] 09/25/2012 Yes     Chronic    AICD (automatic cardioverter/defibrillator) present [Z95.810] 09/25/2012 Yes     Chronic      Problems Resolved During this Admission:    Diagnosis Date Noted Date Resolved POA       Discharged Condition: poor    Follow Up:    Patient Instructions:     Activity as tolerated       Medications:  Reconciled Home Medications:   Discharge Medication List as of 1/26/2018 10:50 AM      CONTINUE these medications which have NOT CHANGED    Details   allopurinol (ZYLOPRIM) 100 MG tablet Take 100 mg by mouth once daily., Until Discontinued, Historical Med      alprazolam (XANAX) 0.25 MG tablet Take 1 tablet (0.25 mg total) by mouth nightly as needed for Anxiety., Starting Thu 9/7/2017, Normal      aspirin (ECOTRIN) 81 MG EC tablet Take 81 mg by mouth once daily., Until Discontinued, Historical Med      atorvastatin (LIPITOR) 20 MG tablet Take 1 tablet (20 mg total) by mouth every evening., Starting 3/27/2017, Until Tue 3/27/18, Normal      carvedilol (COREG) 12.5 MG tablet Take 1 tablet by mouth 2 (two) times daily., Starting Mon 8/7/2017, Historical Med      clopidogrel (PLAVIX) 75 mg tablet Take 1 tablet (75 mg total) by mouth once daily., Starting 3/27/2017, Until Discontinued, Normal      ENTRESTO 49-51 mg per tablet Take 1 tablet by mouth 2 (two) times daily., Starting Mon 11/27/2017, Historical Med      escitalopram oxalate (LEXAPRO) 10 MG tablet Take 1 tablet (10 mg total) by mouth once daily. For depression, Starting 2/9/2017, Until Fri 2/9/18, Normal      finasteride (PROSCAR) 5 mg tablet Take 5 mg by mouth once daily., Starting 7/24/2015, Until Discontinued, Historical Med      fluticasone-salmeterol 250-50 mcg/dose (ADVAIR) 250-50 mcg/dose diskus inhaler Inhale 1 puff into the lungs 2 (two) times daily. Controller, Starting Thu 11/16/2017,  Until Fri 11/16/2018, Normal      furosemide (LASIX) 20 MG tablet Take 2 tablets (40 mg total) by mouth every 12 (twelve) hours. For fluid, Starting Thu 11/9/2017, Until Fri 11/9/2018, Normal      mirtazapine (REMERON) 7.5 MG Tab Take 1 tablet (7.5 mg total) by mouth every evening., Starting Thu 1/18/2018, Until Fri 1/18/2019, Normal      omeprazole (PRILOSEC) 20 MG capsule Take 1 capsule (20 mg total) by mouth 2 (two) times daily., Starting Thu 11/16/2017, Until Fri 11/16/2018, Normal      saw palmetto 500 MG capsule Take 450 mg by mouth once daily. , Until Discontinued, Historical Med      tamsulosin (FLOMAX) 0.4 mg Cp24 Take 1 capsule (0.4 mg total) by mouth every evening., Starting Sat 8/19/2017, No Print      URELLE 81-0.12 mg Tab Take 1 tablet by mouth as needed. , Starting 6/29/2014, Until Discontinued, Historical Med      nitroGLYCERIN (NITROSTAT) 0.4 MG SL tablet Place 1 tablet (0.4 mg total) under the tongue every 5 (five) minutes as needed for Chest pain (maximum 3 doses in 24 hours)., Starting 3/27/2017, Until Tue 3/27/18, Normal      polyethylene glycol (GLYCOLAX) 17 gram/dose powder TAKE 17 GRAMS BY MOUTH NIGHTLY, Normal         STOP taking these medications       sacubitril-valsartan (ENTRESTO)  mg per tablet Comments:   Reason for Stopping:               Time spent on the discharge of patient: 20 minutes    EMILY Buckley, ANP  Cardiology  [facility]

## 2018-01-26 NOTE — ASSESSMENT & PLAN NOTE
-echo with severely depressed LV function with EF 20% with severe MR and PHTN  -BNP >4900 with mild pulmonary vascular congestion on CXR  -tachypneic at rest  -endorses NYHA class IV HF symptoms  -overall prognosis very poor given severity of HR and MR; patient and family requesting hospice which is reasonable

## 2018-01-26 NOTE — PROGRESS NOTES
Hospice nurse here to talk with family and patient. Agreed upon going home. AICD turned off by [employer]. Family given paperwork and all education per case management and hospice nurse. IV taken out. Pt taken off of monitor. EMS here to take pt home. No pain or other complaints or questions at this time.

## 2018-01-26 NOTE — PLAN OF CARE
Problem: Patient Care Overview  Goal: Plan of Care Review  Outcome: Ongoing (interventions implemented as appropriate)  POC reviewed with patient and family. Questions answered. Family and pt verbalized understanding.

## 2018-01-26 NOTE — PLAN OF CARE
TN met with spouse, and 2 daughters who are inquiring about home hospice. They have no preference on agency. TN called Dee Dee Rincon with [facility] and notified her of referral. Dee Dee coming to meet with family . Tn notified XIOMARA Steele NP of above and she will write home hospice orders and call [employer] to come turn off defibrillator. . Tn will arrange transport once all paperwork complete and . Pt code status changed to DNR.   Yoana RN ICU Nurse aware of above.       01/26/18 1007   Discharge Assessment   Assessment Type Discharge Planning Assessment   Confirmed/corrected address and phone number on facesheet? Yes   Assessment information obtained from? Caregiver;Patient   Communicated expected length of stay with patient/caregiver yes   Prior to hospitilization cognitive status: Alert/Oriented   Prior to hospitalization functional status: Needs Assistance;Assistive Equipment   Current cognitive status: (lethargic)   Current Functional Status: Needs Assistance   Lives With spouse;child(silver), adult   Able to Return to Prior Arrangements yes   Is patient able to care for self after discharge? No   Who are your caregiver(s) and their phone number(s)? Gayatri (daughter) [phone]   Patient's perception of discharge disposition hospice/home   Readmission Within The Last 30 Days no previous admission in last 30 days   Patient currently being followed by outpatient case management? No   Patient currently receives any other outside agency services? No   Do you have any problems affording any of your prescribed medications? No   Is the patient taking medications as prescribed? yes   Does the patient have transportation home? Yes   Transportation Available family or friend will provide   Does the patient receive services at the Coumadin Clinic? No   Discharge Plan A Hospice/home

## 2018-01-26 NOTE — SUBJECTIVE & OBJECTIVE
Past Medical History:   Diagnosis Date    Chronic combined systolic and diastolic congestive heart failure     Coronary artery disease     Diabetes mellitus, type II     Hyperlipidemia     Hypertension     Primary cardiomyopathy     Valvular regurgitation     m/s MR    Ventricular tachycardia        Past Surgical History:   Procedure Laterality Date    CARDIAC DEFIBRILLATOR PLACEMENT  10/2/2008; 1/5/2015    [employer]; converted to biventricular ICD VIVA QUAD XT CRT-D [id number]: Serial No. [id number]    CARDIAC DEFIBRILLATOR PLACEMENT      CORONARY ANGIOPLASTY WITH STENT PLACEMENT  4/15/2008; 10/16/2014    LAD; RCA    KNEE SURGERY      left knee       Review of patient's allergies indicates:   Allergen Reactions    Antihistamines - alkylamine      Due to prostate     Sulfa (sulfonamide antibiotics) Hives       No current facility-administered medications on file prior to encounter.      Current Outpatient Prescriptions on File Prior to Encounter   Medication Sig    allopurinol (ZYLOPRIM) 100 MG tablet Take 100 mg by mouth once daily.    alprazolam (XANAX) 0.25 MG tablet Take 1 tablet (0.25 mg total) by mouth nightly as needed for Anxiety.    aspirin (ECOTRIN) 81 MG EC tablet Take 81 mg by mouth once daily.    atorvastatin (LIPITOR) 20 MG tablet Take 1 tablet (20 mg total) by mouth every evening.    carvedilol (COREG) 12.5 MG tablet Take 1 tablet by mouth 2 (two) times daily.    clopidogrel (PLAVIX) 75 mg tablet Take 1 tablet (75 mg total) by mouth once daily.    ENTRESTO 49-51 mg per tablet Take 1 tablet by mouth 2 (two) times daily.    escitalopram oxalate (LEXAPRO) 10 MG tablet Take 1 tablet (10 mg total) by mouth once daily. For depression    finasteride (PROSCAR) 5 mg tablet Take 5 mg by mouth once daily.    fluticasone-salmeterol 250-50 mcg/dose (ADVAIR) 250-50 mcg/dose diskus inhaler Inhale 1 puff into the lungs 2 (two) times daily. Controller    furosemide (LASIX) 20 MG tablet Take 2  tablets (40 mg total) by mouth every 12 (twelve) hours. For fluid    mirtazapine (REMERON) 7.5 MG Tab Take 1 tablet (7.5 mg total) by mouth every evening.    omeprazole (PRILOSEC) 20 MG capsule Take 1 capsule (20 mg total) by mouth 2 (two) times daily.    saw palmetto 500 MG capsule Take 450 mg by mouth once daily.     tamsulosin (FLOMAX) 0.4 mg Cp24 Take 1 capsule (0.4 mg total) by mouth every evening.    URELLE 81-0.12 mg Tab Take 1 tablet by mouth as needed.     nitroGLYCERIN (NITROSTAT) 0.4 MG SL tablet Place 1 tablet (0.4 mg total) under the tongue every 5 (five) minutes as needed for Chest pain (maximum 3 doses in 24 hours).    polyethylene glycol (GLYCOLAX) 17 gram/dose powder TAKE 17 GRAMS BY MOUTH NIGHTLY    [DISCONTINUED] sacubitril-valsartan (ENTRESTO)  mg per tablet Take 1 tablet by mouth 2 (two) times daily.     Family History     Problem Relation (Age of Onset)    Diabetes Maternal Aunt        Social History Main Topics    Smoking status: Never Smoker    Smokeless tobacco: Never Used    Alcohol use No    Drug use: No    Sexual activity: Not on file     Review of Systems   Constitution: Positive for decreased appetite, weakness, malaise/fatigue and weight loss. Negative for chills, diaphoresis and fever.   Cardiovascular: Positive for dyspnea on exertion, orthopnea and paroxysmal nocturnal dyspnea. Negative for chest pain, claudication, cyanosis, irregular heartbeat, leg swelling, near-syncope, palpitations and syncope.   Respiratory: Positive for shortness of breath. Negative for cough, hemoptysis and wheezing.    Gastrointestinal: Positive for bloating. Negative for abdominal pain, constipation, diarrhea, melena, nausea and vomiting.   Neurological: Negative for dizziness.     Objective:     Vital Signs (Most Recent):  Temp: 98 °F (36.7 °C) (01/26/18 1115)  Pulse: 64 (01/26/18 1130)  Resp: (!) 30 (01/26/18 1100)  BP: (!) 56/39 (01/26/18 1130)  SpO2: 99 % (01/26/18 1130) Vital Signs  (24h Range):  Temp:  [97 °F (36.1 °C)-98 °F (36.7 °C)] 98 °F (36.7 °C)  Pulse:  [64-85] 64  Resp:  [6-45] 30  SpO2:  [85 %-100 %] 99 %  BP: ()/(39-71) 56/39     Weight: 61.9 kg (136 lb 7.4 oz)  Body mass index is 20.75 kg/m².    SpO2: 99 %  O2 Device (Oxygen Therapy): room air      Intake/Output Summary (Last 24 hours) at 01/26/18 1409  Last data filed at 01/26/18 1100   Gross per 24 hour   Intake              120 ml   Output              200 ml   Net              -80 ml       Lines/Drains/Airways     Peripheral Intravenous Line                 Peripheral IV - Single Lumen 01/25/18 1409 Right Forearm 1 day                Physical Exam   Constitutional: He has a sickly appearance. He appears ill.   Cardiovascular: Normal rate and regular rhythm.  Exam reveals no gallop.    No murmur heard.  Pulmonary/Chest: Effort normal. He has decreased breath sounds.       Significant Labs:       Recent Labs  Lab 01/25/18 1956 01/26/18  0442   * 145   K 4.0 4.0    109   CO2 25 22*   BUN 76* 80*   CREATININE 3.0* 3.1*   MG 2.4  --        Recent Labs  Lab 01/25/18 1956   WBC 4.03   RBC 3.93*   HGB 11.1*   HCT 37.1*   PLT 95*   MCV 94   MCH 28.2   MCHC 29.9*       Significant Imaging: Echocardiogram:   2D echo with color flow doppler:   Results for orders placed or performed during the hospital encounter of 01/09/18   2D Echo w/ Color Flow Doppler   Result Value Ref Range    EF 20 (A) 55 - 65    Mitral Valve Regurgitation SEVERE (A)     Est. PA Systolic Pressure 101.86 (A)     Mitral Valve Mobility TETHERED POSTERIOR LEAFLET     Tricuspid Valve Regurgitation MODERATE (A)

## 2018-01-26 NOTE — HPI
76 y/o male with hx of HTN, DM, HLP, CKD, CAD s/p PCI 2008 and 2014, HFrEF with ICM with functional MR with last EF 30% (improved from 10%), s/p CRT-D (BiV upgrade Jan 2015 by Dr. Myers), hx of Vtach, moderate to severe MR per last 2DE, PH with PASP >70, GERD who presents to the ER with complaints of SOB. Mr. Claros complains of SOB with minimal exertion for a few weeks with progressive worsening last night. He is accompaniedy by 2 of his daughters and his wife. He reports his SOB would typically ease with rest however yesterday evening it was persisting and not relieving. He did not want to come to the hospital however his family convinced him due to their concern for his acute issues. He reports decreased appetite with 20lb weight loss over the past month, abdominal bloating, weakness and fatigue. His family feels he has definitely declined over the past several months and is not able to do things as he did before. He was seen by the HF clinic as well as the  IC clinic for evaluation of MR however was not a candidate for mitral clip due to his functional MR and was continued on his current medication regimen per the HF clinic    His family initiated a conversation with me today in regards to goals of care and expressed a desire for discussion of hospice. They all definitely had a great grasp of his severe cardiac condition and were all in agreement of proceeding with hospice

## 2018-01-31 ENCOUNTER — OUTPATIENT CASE MANAGEMENT (OUTPATIENT)
Dept: ADMINISTRATIVE | Facility: OTHER | Age: 78
End: 2018-01-31

## 2018-01-31 NOTE — PROGRESS NOTES
The following patient has been assigned to Keyana Llanos, RN with Outpatient Complex Care Management for high risk screening.    Reason: High Risk Recently Discharged    Please contact [facility] at [phone] with any questions.    Thank you,    Ivette Rudolph, [facility]

## 2018-02-02 ENCOUNTER — OUTPATIENT CASE MANAGEMENT (OUTPATIENT)
Dept: ADMINISTRATIVE | Facility: OTHER | Age: 78
End: 2018-02-02

## 2018-02-02 NOTE — PROGRESS NOTES
Spoke to pts daughter/caregiver. Caregiver reports pt is currently has Hospice and has not needs at this time. Will close case at this time. ARSEN Llanos RN-[facility]

## 2021-03-23 NOTE — DISCHARGE INSTRUCTIONS
Left- or Right- Side Congestive Heart Failure    The heart is a large muscle. It is a pump that circulates blood throughout the body. Blood carries oxygen to all of the organs, including the brain, muscles, and skin. After your body takes the oxygen out of the blood, the blood returns to the heart. The right side of the heart collects the blood from the body and pumps it to the lungs. In the lungs, it gets fresh oxygen and gives up carbon dioxide. The oxygen-rich blood from the lungs then returns to the left side of the heart, where it is pumped back out to the rest of your body, starting the process all over.  Congestive heart failure (CHF) occurs when the heart muscle is weakened. This affects the pumping action of the heart. Heart failure can affect the right side of the heart or the left side. But heart failure may affect not only the right side of the heart or only the left side. Although it may have started on one side, it can and often eventually does affect both sides.  Right-side heart failure  When the right side of the heart is weakened, it cant handle the blood it is getting from the rest of the body. This blood returns to the heart through veins. When too much pressure builds up in the veins, fluid leaks out into the tissues. Gravity then causes that fluid to move to those parts of the body that are the lowest. So one of the first symptoms of right-side CHF can include swelling in the feet and ankles. If the condition gets worse, the swelling can even go up past the knees. Sometimes it gets so severe, the liver can get congested as well.  Left-side heart failure  When the left side of the heart is weakened, it cant handle the blood it gets from the lungs. Pressure then builds up in the veins of the lungs, causing fluid to leak into the lung tissues. This may cause CHF and pulmonary edema. This causes you to feel short of breath, weak, or dizzy. These symptoms are often worse with exertion, such as  Rx request when climbing stairs or walking up hills. Lying with your head flat is uncomfortable and can make your breathing worse. This may make sleeping difficult. You may need to use extra pillows to elevate your upper body to sleep well. The same is true when just resting during the daytime.  There are many causes of heart failure including:  · Coronary artery disease  · Past heart attack (also known as acute myocardial infarction, or AMI)  · High blood pressure  · Damaged heart valve  · Diabetes  · Obesity  · Cigarette smoking  · Alcohol abuse  Heart failure is a chronic condition. There is no cure. The purpose of medical treatment is to improve the pumping action of the heart. The main way to do this is to remove excess water from the body. A number of medicines can help reach this goal, improve symptoms, and prevent the heart from becoming weaker. Sometimes, heart failure can become so severe that a device is placed in the heart to help with pumping. Another major goal is to better treat the causes of heart failure, such as diabetes and high blood pressure, by making changes in your lifestyle and maximizing medical control when needed.  Home care  Follow these guidelines when caring for yourself at home:  · Check your weight every day. This is very important because a sudden increase in weight gain could mean worsening heart failure. Keep these things in mind:  ¨ Use the same scale every day.  ¨ Weigh yourself at the same time every day.  ¨ Make sure the scale is on a hard floor surface, not on a rug or carpet.  ¨ Keep a record of your weight every day so your healthcare provider can see it. If you are not given a log sheet for this, keep a separate journal for this purpose.   · Cut back on the amount of salt (sodium) you eat. Follow your healthcare provider's recommendation on how much salt or sodium you should have each day.  ¨ Avoid high-salt foods. These include olives, pickles, smoked meats, salted potato chips, and  most prepared foods.  ¨ Don't add salt to your food at the table. Use only small amounts of salt when cooking.  ¨ Read the labels carefully on food packages to learn how much salt or sodium is in each serving in the package. Remember, a can or package of food may contain more than 1 serving. So if you eat all the food in the package, you may be getting more salt than you think.  · Follow your healthcare provider's recommendations about how much fluid you should have. Be aware that some foods, such as soup, pudding, and juicy fruits like oranges or melons, contain liquid. You'll need to count the liquid in those foods as part of your daily fluid intake. Your provider can help you with this.  · Stop smoking.  · Cut back on how much alcohol you drink.  · Lose weight if you are overweight. The excess weight adds a lot of stress on the workload of the heart.  · Stay active. Talk with your provider about an exercise program that is safe for your heart.  · Keep your feet elevated to reduce swelling. Ask your provider about support hose as a preventive treatment for daytime leg swelling.  Besides taking your medicine as instructed, an important part of treatment is lifestyle changes. These include diet, physical activity, stopping smoking, and weight control.  Improve your diet by including more fresh foods, cutting back on how much sugar and saturated fat you eat, and eating fewer processed foods and less salt.  Follow-up care  Follow up with your healthcare provider, or as advised.  Make sure to keep any appointments that were made for you. These can help better control your congestive heart failure. You will need to follow up with your provider on a routine basis to make sure your heart failure is well managed.  If an X-ray, ECG, or other tests were done, you will be told of any new findings that may affect your care.  Call 911  Call 911 if you:  · Become severely short of breath  · Feel lightheaded, or feel like you  might pass out or faint  · Have chest pain or discomfort that is different than usual, the medicines your doctor told you to use for this don't help, or the pain lasts longer than 10 to 15 minutes  · You suddenly develop a rapid heart rate  When to seek medical advice  The following may be signs that your heart failure is getting worse. Call your healthcare provider right away if any of these happen:  · Sudden weight gain. This means 3 or more pounds in one day, or 5 or more pounds in 1 week.  · Trouble breathing not related to being active  · New or increased swelling of your legs or ankles  · Swelling or pain in your abdomen  · Breathing trouble at night. This means waking up short of breath or needing more pillows to breathe.  · Frequent coughing that doesnt go away  · Feeling much more tired than usual  © 1190-3850 [employer]. [address] [place] [id number]. All rights reserved. This information is not intended as a substitute for professional medical care. Always follow your healthcare professional's instructions.          Coping with Heart Failure  Its normal to feel sad or down at times when youre living with heart failure. Some medicines can also affect your mood. Following your treatment plan may seem difficult at times. If you feel overwhelmed, just focus on one day at a time. Dont be afraid to ask others for help when you need it.    Ways to feel better  Try not to withdraw from family and friends, even if you are finding it hard to talk to them. They can still be a good source of support. To feel better, you can also:  · Spend time doing things you enjoy. This may include participating in a favorite hobby, meditating, praying, or spending time with people you care about. Find activities that make you happy and make those a priority.  · Share what you learn about heart failure with the people in your life. Invite family members along when you visit your healthcare provider.  This will help you feel supported as well as help you discuss the care plan you've agreed upon with your doctor.  · Think about joining a support group for people with heart failure. It may be easier to talk to people who know firsthand what youre going through. They can offer advice and share stories. You may want to ask loved ones to join you for a meeting.  Asking for help  Having heart failure doesnt mean that you have to feel bad all the time. Consider talking to your healthcare provider or a therapist if:  · You feel worthless or helpless, or are thinking about suicide. These are warning signs of depression. Treatment can help you feel better. When depression is under control, your overall health may also improve.  · You feel anxious about what will happen to your loved ones if your health gets worse. Taking care of legal arrangements, such as a living will and durable power of , can help you feel more secure about the future.  · Social support helps alleviate stress and helps your stick with your healthy lifestyle changes. Without social support, you may end up back in the hospital.  © 4592-3773 [employer]. [address] [place] [address], [place] [address]. All rights reserved. This information is not intended as a substitute for professional medical care. Always follow your healthcare professional's instructions.